# Patient Record
Sex: FEMALE | Race: WHITE | NOT HISPANIC OR LATINO | Employment: OTHER | URBAN - METROPOLITAN AREA
[De-identification: names, ages, dates, MRNs, and addresses within clinical notes are randomized per-mention and may not be internally consistent; named-entity substitution may affect disease eponyms.]

---

## 2017-01-27 ENCOUNTER — RADIANT APPOINTMENT (OUTPATIENT)
Dept: GENERAL RADIOLOGY | Facility: CLINIC | Age: 82
End: 2017-01-27
Attending: PEDIATRICS
Payer: COMMERCIAL

## 2017-01-27 ENCOUNTER — OFFICE VISIT (OUTPATIENT)
Dept: ORTHOPEDICS | Facility: CLINIC | Age: 82
End: 2017-01-27
Payer: COMMERCIAL

## 2017-01-27 VITALS
SYSTOLIC BLOOD PRESSURE: 130 MMHG | DIASTOLIC BLOOD PRESSURE: 72 MMHG | BODY MASS INDEX: 29.77 KG/M2 | WEIGHT: 168 LBS | HEIGHT: 63 IN

## 2017-01-27 DIAGNOSIS — M54.42 LEFT-SIDED LOW BACK PAIN WITH LEFT-SIDED SCIATICA, UNSPECIFIED CHRONICITY: Primary | ICD-10-CM

## 2017-01-27 DIAGNOSIS — M54.5 MIDLINE LOW BACK PAIN, UNSPECIFIED CHRONICITY, WITH SCIATICA PRESENCE UNSPECIFIED: ICD-10-CM

## 2017-01-27 PROCEDURE — 72100 X-RAY EXAM L-S SPINE 2/3 VWS: CPT

## 2017-01-27 PROCEDURE — 99214 OFFICE O/P EST MOD 30 MIN: CPT | Performed by: PEDIATRICS

## 2017-01-27 NOTE — PATIENT INSTRUCTIONS
Advanced imaging is done by appointment. Some insurance require a prior authorization to be completed which may delay the time until you are able to schedule your appointment. You should be receiving a call from the scheduling department, if you have not heard from them in 24-48 hours.   Please call [place], [place] and [place]: [phone] to schedule your Lumbar MRI.  Depending on your availability you can usually schedule within the next 1-2 days.    The clinic will call you with results, if you have not heard from the clinic within 3-4 days following your MRI please contact us at the number listed below.     If you have any further questions for your physician or physician s care team you can call [phone] and use option 3 to leave a voice message. Calls received during business hours will be returned same day.

## 2017-01-27 NOTE — MR AVS SNAPSHOT
After Visit Summary   1/27/2017    Rosemarie Fry    MRN: [id number]           Patient Information     Date Of Birth          11/7/1934        Visit Information        Provider Department      1/27/2017 12:00 PM Jeramy Dumont,  [facility] [place]        Today's Diagnoses     Left-sided low back pain with left-sided sciatica, unspecified chronicity    -  1       Care Instructions     Advanced imaging is done by appointment. Some insurance require a prior authorization to be completed which may delay the time until you are able to schedule your appointment. You should be receiving a call from the scheduling department, if you have not heard from them in 24-48 hours.   Please call [place] and [place]: [phone] to schedule your Lumbar MRI.  Depending on your availability you can usually schedule within the next 1-2 days.    The clinic will call you with results, if you have not heard from the clinic within 3-4 days following your MRI please contact us at the number listed below.     If you have any further questions for your physician or physician s care team you can call [phone] and use option 3 to leave a voice message. Calls received during business hours will be returned same day.                Follow-ups after your visit        Future tests that were ordered for you today     Open Future Orders        Priority Expected Expires Ordered    MR Lumbar Spine w/o Contrast Routine  1/27/2018 1/27/2017            Who to contact     If you have questions or need follow up information about today's clinic visit or your schedule please contact [facility] [place] directly at [phone].  Normal or non-critical lab and imaging results will be communicated to you by MyChart, letter or phone within 4 business days after the clinic has received the results. If you do not hear from us within 7 days, please contact the clinic  "through [employer] or phone. If you have a critical or abnormal lab result, we will notify you by phone as soon as possible.  Submit refill requests through [employer] or call your pharmacy and they will forward the refill request to us. Please allow 3 business days for your refill to be completed.          Additional Information About Your Visit        [employer] Information     [employer] lets you send messages to your doctor, view your test results, renew your prescriptions, schedule appointments and more. To sign up, go to [email or web address] . Click on \"Log in\" on the left side of the screen, which will take you to the Welcome page. Then click on \"Sign up Now\" on the right side of the page.     You will be asked to enter the access code listed below, as well as some personal information. Please follow the directions to create your username and password.     Your access code is: [email or web address]  Expires: 2017  2:13 PM     Your access code will  in 90 days. If you need help or a new code, please call your [facility] clinic or [phone].        Care EveryWhere ID     This is your Care EveryWhere ID. This could be used by other organizations to access your [facility] medical records  [id number]        Your Vitals Were     Height BMI (Body Mass Index)                5' 2.5\" (1.588 m) 30.22 kg/m2           Blood Pressure from Last 3 Encounters:   17 130/72   16 143/80   10/18/16 135/83    Weight from Last 3 Encounters:   17 168 lb (76.204 kg)   16 168 lb 3.2 oz (76.295 kg)   10/18/16 167 lb 3.2 oz (75.841 kg)                 Today's Medication Changes          These changes are accurate as of: 17  2:13 PM.  If you have any questions, ask your nurse or doctor.               These medicines have changed or have updated prescriptions.        Dose/Directions    gabapentin 100 MG capsule   Commonly known as:  NEURONTIN   This may have changed:  additional instructions   Used for:  Analgesic " rebound headache, Chronic tension-type headache, not intractable        Week 1: 100 mg in the evening. Week 2: 100 mg times a day. Week 3 and afterwards : 100 mg three times a day   Quantity:  270 capsule   Refills:  3                Primary Care Provider Office Phone #    [facility] [phone]       No address on file        Thank you!     Thank you for choosing [facility]  for your care. Our goal is always to provide you with excellent care. Hearing back from our patients is one way we can continue to improve our services. Please take a few minutes to complete the written survey that you may receive in the mail after your visit with us. Thank you!             Your Updated Medication List - Protect others around you: Learn how to safely use, store and throw away your medicines at [email or web address].          This list is accurate as of: 1/27/17  2:13 PM.  Always use your most recent med list.                   Brand Name Dispense Instructions for use    budesonide-formoterol 160-4.5 MCG/ACT Inhaler    SYMBICORT    1 Inhaler    Inhale 2 puffs into the lungs 2 times daily       CALCIUM PO      none given       FISH OIL PO      none given       Flax Oil      None Entered       gabapentin 100 MG capsule    NEURONTIN    270 capsule    Week 1: 100 mg in the evening. Week 2: 100 mg times a day. Week 3 and afterwards : 100 mg three times a day       GLUCOSAMINE HCL PO      None Entered       levothyroxine 25 MCG tablet    SYNTHROID/LEVOTHROID    90 tablet    Take 1 tablet (25 mcg) by mouth daily       loratadine 10 MG tablet    CLARITIN    30 tablet    Take 1 tablet (10 mg) by mouth daily       metoprolol 25 MG tablet    LOPRESSOR     Take 25 mg by mouth 2 times daily       nitroglycerin 0.4 MG sublingual tablet    NITROSTAT    25 tablet    Place 1 tablet (0.4 mg) under the tongue every 5 minutes as needed for chest pain If you are still having symptoms after 3 doses (15  minutes) call 911.       VITAMIN D PO      None Entered       WOMENS 50+ MULTI VITAMIN/MIN PO      DAILY       XARELTO 20 MG Tabs tablet   Generic drug:  rivaroxaban ANTICOAGULANT      Take 1 tablet (20 mg) by mouth daily (with dinner)
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
## 2017-01-27 NOTE — Clinical Note
[facility] [place]  [address]  [place] [address]  Phone: [phone]  Fax: [phone]        January 27, 2017      RE:Rosemarie HAIR Fry      To whom it may concern,      Patient is currently under my care for an injury.  Please excuse her from work.  She will have further work up for her injury.  She should be off of work until further evaluation, begin with 1 week., but can be longer pending evaluation.      Sincerely,        Jeramy Dumont DO CAQ/arambula

## 2017-01-31 ENCOUNTER — RADIANT APPOINTMENT (OUTPATIENT)
Dept: MRI IMAGING | Facility: CLINIC | Age: 82
End: 2017-01-31
Attending: PEDIATRICS
Payer: COMMERCIAL

## 2017-01-31 ENCOUNTER — TELEPHONE (OUTPATIENT)
Dept: ORTHOPEDICS | Facility: CLINIC | Age: 82
End: 2017-01-31

## 2017-01-31 DIAGNOSIS — M54.42 LEFT-SIDED LOW BACK PAIN WITH LEFT-SIDED SCIATICA: Primary | ICD-10-CM

## 2017-01-31 DIAGNOSIS — M54.42 LEFT-SIDED LOW BACK PAIN WITH LEFT-SIDED SCIATICA, UNSPECIFIED CHRONICITY: ICD-10-CM

## 2017-01-31 PROCEDURE — 72148 MRI LUMBAR SPINE W/O DYE: CPT | Mod: TC

## 2017-01-31 NOTE — TELEPHONE ENCOUNTER
MRI LUMBAR SPINE WITHOUT CONTRAST January 31, 2017 1:04 PM       HISTORY: Left lumbar radiculopathy. Lumbago with sciatica, left side.     TECHNIQUE: Multiplanar multisequence MRI of the lumbar spine without  contrast.     COMPARISON: X-ray from 1/27/2017.     FINDINGS: The report is dictated assuming five lumbar-type vertebral  bodies.  Chronic superior endplate compression fracture L1 with loss  of approximately 50% of central and anterior vertebral body height.  Schmorl's nodes at the lower thoracic levels imaged. Bone marrow  signal is unremarkable. Tip of the conus medullaris and cauda equina  are unremarkable. Axial scans were performed from T11 to sacrum.     T11-T12: Mild disc bulge without stenosis.     T12-L1: Mild disc bulge and osteophytic ridging. Mild central  stenosis. Neural foramen are patent.     L1-L2: Mild disc bulge. No central stenosis. Neural foramen are  patent. Facet joints are unremarkable.     L2-L3: Mild disc bulge. No central stenosis. Neural foramen are  patent. Facet joints are unremarkable.     L3-L4: Mild disc bulge. No central stenosis. Neural foramen are  patent. Facet joints are unremarkable.     L4-L5: Broad-based disc bulge. Mild left facet degenerative changes.  Mild central stenosis. Mild right and mild to moderate left foraminal  stenosis.     L5-S1: Prominent facet hypertrophy. Mild grade 1 degenerative  spondylolisthesis. Broad-based disc bulge. Moderate central stenosis.  Right neural foramen is patent. Moderate left foraminal stenosis.     Paraspinous soft tissues: Multiple nerve root sleeve cysts seen  throughout.                                                                       IMPRESSION:     1. At T12-L1 there is mild central stenosis.  2. At L4-L5 there is mild central stenosis. Mild right and mild to  moderate left foraminal stenosis.  3. At L5-S1 there is grade 1 degenerative spondylolisthesis with  moderate central stenosis. Moderate left foraminal  stenosis.     EZEQUIEL URBAN MD

## 2017-02-02 NOTE — TELEPHONE ENCOUNTER
MRI reviewed. Most prominent finding is L5-S1 spondylolisthesis, facet arthrosis, and left foraminal stenosis; this may be source of her radicular pain. She also has some stenosis left L4-L5.  Would offer DANIA as previously discussed. F/u 2-3 weeks after injection. Would also suggest PT, may start before or after injection.  Jeramy Dumont, DO, CAQ

## 2017-02-02 NOTE — TELEPHONE ENCOUNTER
LVM with detailed information regarding that Dr Dumont has not reviewed results at this time and is out of clinic today.  Advised that anticipate they will contacted with results when he returns to clinic on 2/3.  Dr Dumont please advise on MRI results below.     Juan Ramon Aggarwal ATC

## 2017-02-07 NOTE — TELEPHONE ENCOUNTER
Patient stopped by in person to discuss MRI results.  Review results and treatment options.  She would like to proceed with both the DANIA and PT.  Orders placed  Yamila Rodriguez MS ATC

## 2017-02-08 ENCOUNTER — TELEPHONE (OUTPATIENT)
Dept: PALLIATIVE MEDICINE | Facility: CLINIC | Age: 82
End: 2017-02-08

## 2017-02-08 NOTE — TELEPHONE ENCOUNTER
Pre-screening questions for Radiology Injections:    Injection to be done at which interventional clinic site? [facility] [place]    Procedure ordered by Dr. Dumont    Procedure ordered? Lumbar Epidural Steroid Injection    What insurance would patient like us to bill for this procedure? [employer]      [employer]'s comp-Any injection DO NOT SCHEDULE and route to Argelia Teresa.      [employer] insurance - If scheduling an SI joint injection DO NOT SCHEDULE and route Argelia Teresa.    [employer]- MBB's must be scheduled at LEAST two weeks apart      [employer] - Any injection besides hip/shoulder/knee joint DO NOT SCHEDULE and route to Argelia Teresa. She will obtain PA and call pt back to schedule procedure or notify pt of denial.     Is an  needed? No     Patient has a drive home? (mandatory) YES:      Is patient taking any blood thinners (plavix, coumadin, jantoven, warfarin, heparin, pradaxa or dabigatran )? No   (If so, do not schedule, contact RN and/or MD)     Is patient taking any aspirin products? No   (If more than 325mg/day do not schedule; Contact RN/MD. For all non-cervical interventional procedures if patient is taking MORE than 325mg/day, limit aspirin to 81-325mg/day x 1 week. No hold required day of procedure.  For CERVICAL procedures, hold all aspirin products for 6 days.)      Does the patient have a bleeding or clotting disorder? No   (If yes, okay to schedule, but contact RN/MD).  **For any patients with platelet count <100, must be forwarded to provider**    Is patient diabetic?  No  If YES, have them bring their glucometer.    Does patient have an active infection or treated for one within the past week? No     Is patient currently taking any antibiotics?  No   For patients on chronic, preventative, or prophylactic antibiotics, procedures can be scheduled.   For patients on antibiotics for active or recent infection:  Toshia Moore, Jose Enrique Martinez,  [place]-antibiotic course must have been completed for 4 days  Toshia Berkowitz-antibiotic course must have been completed for 7 days    Is patient currently taking any steroid medications? (i.e. Prednisone, Medrol)  No   For patients on steroid medications:  Juan Howard [place], [place]-steroid course must have been completed for 4 days  Toshia Berkowitz-steroid course must have been completed for 7 days  Review with patient:  If you are started on any steroids or antibiotics between now and your appointment, you must contact us because it may affect our ability to perform your procedure informed    Is patient actively being treated for cancer or immunocompromised, including the spleen having been removed? No  **For Dr. Short patients without spleens should have the chart sent to her**  (If YES, do NOT schedule and route to RN)    Are you able to get on and off an exam table with minimal or no assistance? Yes  (If NO, do NOT schedule and route to RN)  Are you able to roll over and lay on your stomach with minimal or no assistance? Yes  (If NO, do NOT schedule and route to RN)         Any allergies to contrast dye, iodine, shellfish, or numbing and steroid medications? No  (If so, inform nursing and note in scheduling comments.)    Allergies: Sulfa drugs      Any chance of pregnancy?NO    Has the patient had a flu shot or any other vaccinations within 7 days before or after the procedure.  No       Does patient have an MRI/CT?  YES: MRI  (SI joint, hip injections, lumbar sympathetic blocks, and stellate ganglion blocks do not require an MRI)    If so, was it done at [facility]? Yes      If not, where was it done? N/A     Was the MRI done w/in the last 3 years?  Yes   If MRI was not done at [facility], [facility] or [facility] do NOT schedule. Route to nursing.  (If pt has disc the injection can be scheduled but pt has to bring disc to appt. If they show up w/out disc the injection cannot be  done)    Reminders (please tell patient if applicable):       Instructed pt to arrive 30 minutes early for IV start if this is for a cervical procedure, ALL sympathetic (stellate ganglion, hypogastric, or lumbar sympathetic block) and all sedation procedures (RFA, spinal cord stimulation trials).  Not Applicable    -IVs are not routinely placed for Martinez and [place] cervical case       If NPO for sedation, informed patient that it is okay to take medications with sips of water (except if they are to hold blood thinners).  Not Applicable   *DO take blood pressure medication if it is prescribed*      If this is for a cervical DANIA, informed patient that aspirin needs to be held for 6 days.   Not Applicable      Do not schedule procedures requiring IV placement in the first appointment of the day or first appointment after lunch         For patients 85 or older we recommend having an adult stay w/ them for the remainder of the day.         Does the patient have any questions?  NO      Kike Mckeon  [facility]

## 2017-02-22 ENCOUNTER — RADIOLOGY INJECTION OFFICE VISIT (OUTPATIENT)
Dept: PALLIATIVE MEDICINE | Facility: CLINIC | Age: 82
End: 2017-02-22
Payer: COMMERCIAL

## 2017-02-22 ENCOUNTER — TELEPHONE (OUTPATIENT)
Dept: ORTHOPEDICS | Facility: CLINIC | Age: 82
End: 2017-02-22

## 2017-02-22 VITALS — SYSTOLIC BLOOD PRESSURE: 148 MMHG | HEART RATE: 82 BPM | DIASTOLIC BLOOD PRESSURE: 91 MMHG

## 2017-02-22 DIAGNOSIS — M54.16 LUMBAR RADICULOPATHY: ICD-10-CM

## 2017-02-22 DIAGNOSIS — M54.42 LEFT-SIDED LOW BACK PAIN WITH LEFT-SIDED SCIATICA, UNSPECIFIED CHRONICITY: Primary | ICD-10-CM

## 2017-02-22 DIAGNOSIS — M54.16 LUMBAR RADICULOPATHY: Primary | ICD-10-CM

## 2017-02-22 PROCEDURE — 99207 ZZC NO BILLABLE SERVICE THIS VISIT: CPT | Performed by: PSYCHIATRY & NEUROLOGY

## 2017-02-22 RX ORDER — METHYLPREDNISOLONE 4 MG
TABLET, DOSE PACK ORAL
Qty: 21 TABLET | Refills: 0 | Status: SHIPPED | OUTPATIENT
Start: 2017-02-22 | End: 2018-02-26

## 2017-02-22 ASSESSMENT — PAIN SCALES - GENERAL: PAINLEVEL: MODERATE PAIN (5)

## 2017-02-22 NOTE — TELEPHONE ENCOUNTER
Dr Quispe had spoken with me prior to note below. Injection not done today.  We can try an oral steroid, though there is a risk of atrial fibrillation and use of high dose oral steroid (she has known a-fib and is on blood thinner as noted below).  Medrol dose pack rx placed.  Contact clinic with update in 2 weeks, sooner if needed; follow up in clinic 4-6 weeks after starting PT. Thanks.  Jeramy Dumont, , CAQ

## 2017-02-22 NOTE — PATIENT INSTRUCTIONS
Nurse Triage line:  [phone]   Call this number with any questions or concerns. You may leave a detailed message anytime. Calls are typically returned Monday through Friday between 8 AM and 4:30 PM. We usually get back to you within 2 business days depending on the issue/request.       Medication refills:    For non-narcotic medications, call your pharmacy directly to request a refill. The pharmacy will contact the [facility] for authorization. Please allow 3-4 days for these refills to be processed.     For narcotic refills, call the nurse triage line or send a Youxiduo message. Please contact us 7-10 days before your refill is due. The message MUST include the name of the specific medication(s) requested and how you would like to receive the prescription(s). The options are as follows:    Pain Clinic staff can mail the prescription to your pharmacy. Please tell us the name of the pharmacy.    You may pick the prescription up at the Pain Clinic (tell us the location) or during a clinic visit with your pain provider    Pain Clinic staff can deliver the prescription to the [facility] pharmacy in the clinic building. Please tell us the location.      Scheduling number: [phone].  Call this number to schedule or change appointments.    We believe regular attendance is key to your success in our program.    Any time you are unable to keep your appointment we ask that you call us at least 24 hours in advance to let us know. This will allow us to offer the appointment time to another patient.

## 2017-02-22 NOTE — TELEPHONE ENCOUNTER
Patient was scheduled with Dr. Quispe for an DANIA.  Patient states she did not want the injection and is also on Xarleto.  She states she was unaware she had to be off of her blood thinner, but also does not feel she needs the injection.  Spoke with her and her daughter, and her daughter would like Rosemarie to try an oral steroid instead.  Asked if she is still on the gabapentin, and she states that she was only on that medication for her neck from the neurologist and she currently has no pain in her neck so she will not keep taking it as she does not want to keep taking pills for her pains.  I discussed the use of the gabapentin for her back pain as well, she does not want to do this.   She has not begun PT as she feels it is not necessary with her working 8 hours a day.  We discussed what PT will do for her.  Her daughter agreed to have her schedule PT.  They will call if they desire DANIA.  Please advise on oral steroid.  Pharmacy selected

## 2017-02-22 NOTE — PROGRESS NOTES
Patient came and was on Xeralto.  This was not disclosed when she scheduled injection.  She and daughter were also not convinced on going forward with injection. Would like to consider PT and steroids.    Discussed what an epidural is and expectations.   Talked with Dr. Dumont, who will come up with plan for patient.  They were walked over to his clinic to discuss further.    Beverley Quispe MD  [facility]

## 2017-02-22 NOTE — TELEPHONE ENCOUNTER
LVM on Yesi's phone (daughter) with detailed information regarding the rx had been provided and sent to pharmacy as requested.     Juan Ramon Aggarwal, ATC

## 2017-02-22 NOTE — NURSING NOTE
"Chief Complaint   Patient presents with     Pain       Initial /63 (BP Location: Left arm)  Pulse 82 Estimated body mass index is 30.24 kg/(m^2) as calculated from the following:    Height as of 1/27/17: 1.588 m (5' 2.5\").    Weight as of 1/27/17: 76.2 kg (168 lb).  Medication Reconciliation: complete     Injection intake:    If this procedure is requiring IV sedation has patient been NPO for 6  Hours? NA    Is patient on coumadin, plavix or other prescribed blood thinner?   Yes, Xarelto    If patient is on coumadin was it held for 5 days?  NO     If patient is on plavix was it held for 7 days?    NA     Does patient take aspirin?  No    If this is for a cervical procedure and patient is on aspirin has it been held for 6 days?   NA    Any allergies to contrast dye, iodine, steroid and/or numbing medications?  NO    Is patient currently taking antibiotics or have an active infection?  NO    Does patient have a ? Yes       Is patient pregnant or breastfeeding?  NO    Are the vital signs normal?  Yes      Ayo Ramirez MA      "

## 2017-02-22 NOTE — MR AVS SNAPSHOT
After Visit Summary   2/22/2017    Rosemarie Fry    MRN: [id number]           Patient Information     Date Of Birth          11/7/1934        Visit Information        Provider Department      2/22/2017 8:15 AM Mireya Quispe MD [facility] [place]        Care Instructions        Nurse Triage line:  [phone]   Call this number with any questions or concerns. You may leave a detailed message anytime. Calls are typically returned Monday through Friday between 8 AM and 4:30 PM. We usually get back to you within 2 business days depending on the issue/request.       Medication refills:    For non-narcotic medications, call your pharmacy directly to request a refill. The pharmacy will contact the [facility] for authorization. Please allow 3-4 days for these refills to be processed.     For narcotic refills, call the nurse triage line or send a Visier message. Please contact us 7-10 days before your refill is due. The message MUST include the name of the specific medication(s) requested and how you would like to receive the prescription(s). The options are as follows:    Pain Clinic staff can mail the prescription to your pharmacy. Please tell us the name of the pharmacy.    You may pick the prescription up at the Pain Clinic (tell us the location) or during a clinic visit with your pain provider    Pain Clinic staff can deliver the prescription to the [facility] pharmacy in the clinic building. Please tell us the location.      Scheduling number: [phone].  Call this number to schedule or change appointments.    We believe regular attendance is key to your success in our program.    Any time you are unable to keep your appointment we ask that you call us at least 24 hours in advance to let us know. This will allow us to offer the appointment time to another patient.               Follow-ups after your visit        Who to contact     If you have questions or need follow up  "information about today's clinic visit or your schedule please contact [facility] directly at [phone].  Normal or non-critical lab and imaging results will be communicated to you by MyChart, letter or phone within 4 business days after the clinic has received the results. If you do not hear from us within 7 days, please contact the clinic through [employer] or phone. If you have a critical or abnormal lab result, we will notify you by phone as soon as possible.  Submit refill requests through Wantworthy or call your pharmacy and they will forward the refill request to us. Please allow 3 business days for your refill to be completed.          Additional Information About Your Visit        MyChart Information     Wantworthy lets you send messages to your doctor, view your test results, renew your prescriptions, schedule appointments and more. To sign up, go to [email or web address] . Click on \"Log in\" on the left side of the screen, which will take you to the Welcome page. Then click on \"Sign up Now\" on the right side of the page.     You will be asked to enter the access code listed below, as well as some personal information. Please follow the directions to create your username and password.     Your access code is: [email or web address]  Expires: 2017  2:13 PM     Your access code will  in 90 days. If you need help or a new code, please call your [facility] clinic or [phone].        Care EveryWhere ID     This is your Care EveryWhere ID. This could be used by other organizations to access your [facility] medical records  [id number]        Your Vitals Were     Pulse                   82            Blood Pressure from Last 3 Encounters:   17 (!) 148/91   17 130/72   16 143/80    Weight from Last 3 Encounters:   17 76.2 kg (168 lb)   16 76.3 kg (168 lb 3.2 oz)   10/18/16 75.8 kg (167 lb 3.2 oz)              Today, you had the following     No orders found for display       "   Today's Medication Changes          These changes are accurate as of: 2/22/17  8:44 AM.  If you have any questions, ask your nurse or doctor.               These medicines have changed or have updated prescriptions.        Dose/Directions    gabapentin 100 MG capsule   Commonly known as:  NEURONTIN   This may have changed:  additional instructions   Used for:  Analgesic rebound headache, Chronic tension-type headache, not intractable        Week 1: 100 mg in the evening. Week 2: 100 mg times a day. Week 3 and afterwards : 100 mg three times a day   Quantity:  270 capsule   Refills:  3                Primary Care Provider Office Phone #    [facility] [phone]       No address on file        Thank you!     Thank you for choosing [facility]  for your care. Our goal is always to provide you with excellent care. Hearing back from our patients is one way we can continue to improve our services. Please take a few minutes to complete the written survey that you may receive in the mail after your visit with us. Thank you!             Your Updated Medication List - Protect others around you: Learn how to safely use, store and throw away your medicines at www.disposemymeds.org.          This list is accurate as of: 2/22/17  8:44 AM.  Always use your most recent med list.                   Brand Name Dispense Instructions for use    budesonide-formoterol 160-4.5 MCG/ACT Inhaler    SYMBICORT    1 Inhaler    Inhale 2 puffs into the lungs 2 times daily       CALCIUM PO      none given       FISH OIL PO      none given       Flax Oil      None Entered       gabapentin 100 MG capsule    NEURONTIN    270 capsule    Week 1: 100 mg in the evening. Week 2: 100 mg times a day. Week 3 and afterwards : 100 mg three times a day       GLUCOSAMINE HCL PO      None Entered       levothyroxine 25 MCG tablet    SYNTHROID/LEVOTHROID    90 tablet    Take 1 tablet (25 mcg) by mouth daily       loratadine 10 MG tablet     CLARITIN    30 tablet    Take 1 tablet (10 mg) by mouth daily       metoprolol 25 MG tablet    LOPRESSOR     Take 25 mg by mouth 2 times daily       nitroglycerin 0.4 MG sublingual tablet    NITROSTAT    25 tablet    Place 1 tablet (0.4 mg) under the tongue every 5 minutes as needed for chest pain If you are still having symptoms after 3 doses (15 minutes) call 911.       VITAMIN D PO      None Entered       WOMENS 50+ MULTI VITAMIN/MIN PO      DAILY       XARELTO 20 MG Tabs tablet   Generic drug:  rivaroxaban ANTICOAGULANT      Take 1 tablet (20 mg) by mouth daily (with dinner)

## 2017-02-23 ENCOUNTER — THERAPY VISIT (OUTPATIENT)
Dept: PHYSICAL THERAPY | Facility: CLINIC | Age: 82
End: 2017-02-23
Payer: COMMERCIAL

## 2017-02-23 DIAGNOSIS — M54.16 LUMBAR RADICULOPATHY: Primary | ICD-10-CM

## 2017-02-23 PROCEDURE — 97110 THERAPEUTIC EXERCISES: CPT | Mod: GP | Performed by: PHYSICAL THERAPIST

## 2017-02-23 PROCEDURE — 97161 PT EVAL LOW COMPLEX 20 MIN: CPT | Mod: GP | Performed by: PHYSICAL THERAPIST

## 2017-02-23 NOTE — MR AVS SNAPSHOT
"              After Visit Summary   2/23/2017    Rosemarie Fry    MRN: [id number]           Patient Information     Date Of Birth          11/7/1934        Visit Information        Provider Department      2/23/2017 1:15 PM Chavo Bran PT [facility] [place] [facility]        Today's Diagnoses     Lumbar radiculopathy    -  1       Follow-ups after your visit        Your next 10 appointments already scheduled     Mar 03, 2017  8:45 AM CST   [facility] with Chavo Bran PT   [facility] [place] [facility] ([facility] [place])    [address]  [address]  [place] [id number] [phone]              Who to contact     If you have questions or need follow up information about today's clinic visit or your schedule please contact [facility] [place] [facility] directly at [phone].  Normal or non-critical lab and imaging results will be communicated to you by MyChart, letter or phone within 4 business days after the clinic has received the results. If you do not hear from us within 7 days, please contact the clinic through [employer] or phone. If you have a critical or abnormal lab result, we will notify you by phone as soon as possible.  Submit refill requests through [employer] or call your pharmacy and they will forward the refill request to us. Please allow 3 business days for your refill to be completed.          Additional Information About Your Visit        MyChart Information     [employer] lets you send messages to your doctor, view your test results, renew your prescriptions, schedule appointments and more. To sign up, go to [email or web address] . Click on \"Log in\" on the left side of the screen, which will take you to the Welcome page. Then click on \"Sign up Now\" on the right side of the page.     You will be asked to enter the access code listed below, as well as some personal information. Please follow the directions to create your username and password.   "   Your access code is: [email or web address]  Expires: 2017  2:13 PM     Your access code will  in 90 days. If you need help or a new code, please call your [facility] or [phone].        Care EveryWhere ID     This is your Care EveryWhere ID. This could be used by other organizations to access your [facility] medical records  [id number]         Blood Pressure from Last 3 Encounters:   17 (!) 148/91   17 130/72   16 143/80    Weight from Last 3 Encounters:   17 76.2 kg (168 lb)   16 76.3 kg (168 lb 3.2 oz)   10/18/16 75.8 kg (167 lb 3.2 oz)              We Performed the Following     PT Eval, Low Complexity (60758)     Therapeutic Exercises          Today's Medication Changes          These changes are accurate as of: 17  9:00 PM.  If you have any questions, ask your nurse or doctor.               These medicines have changed or have updated prescriptions.        Dose/Directions    gabapentin 100 MG capsule   Commonly known as:  NEURONTIN   This may have changed:  additional instructions   Used for:  Analgesic rebound headache, Chronic tension-type headache, not intractable        Week 1: 100 mg in the evening. Week 2: 100 mg times a day. Week 3 and afterwards : 100 mg three times a day   Quantity:  270 capsule   Refills:  3                Primary Care Provider Office Phone #    [facility] [phone]       No address on file        Thank you!     Thank you for choosing [facility]  for your care. Our goal is always to provide you with excellent care. Hearing back from our patients is one way we can continue to improve our services. Please take a few minutes to complete the written survey that you may receive in the mail after your visit with us. Thank you!             Your Updated Medication List - Protect others around you: Learn how to safely use, store and throw away your medicines at www.disposemymeds.org.          This list  is accurate as of: 2/23/17  9:00 PM.  Always use your most recent med list.                   Brand Name Dispense Instructions for use    budesonide-formoterol 160-4.5 MCG/ACT Inhaler    SYMBICORT    1 Inhaler    Inhale 2 puffs into the lungs 2 times daily       CALCIUM PO      none given       FISH OIL PO      none given       Flax Oil      None Entered       gabapentin 100 MG capsule    NEURONTIN    270 capsule    Week 1: 100 mg in the evening. Week 2: 100 mg times a day. Week 3 and afterwards : 100 mg three times a day       GLUCOSAMINE HCL PO      None Entered       levothyroxine 25 MCG tablet    SYNTHROID/LEVOTHROID    90 tablet    Take 1 tablet (25 mcg) by mouth daily       loratadine 10 MG tablet    CLARITIN    30 tablet    Take 1 tablet (10 mg) by mouth daily       methylPREDNISolone 4 MG tablet    MEDROL DOSEPAK    21 tablet    Follow package instructions       metoprolol 25 MG tablet    LOPRESSOR     Take 25 mg by mouth 2 times daily       nitroglycerin 0.4 MG sublingual tablet    NITROSTAT    25 tablet    Place 1 tablet (0.4 mg) under the tongue every 5 minutes as needed for chest pain If you are still having symptoms after 3 doses (15 minutes) call 911.       VITAMIN D PO      None Entered       WOMENS 50+ MULTI VITAMIN/MIN PO      DAILY       XARELTO 20 MG Tabs tablet   Generic drug:  rivaroxaban ANTICOAGULANT      Take 1 tablet (20 mg) by mouth daily (with dinner)

## 2017-02-23 NOTE — PROGRESS NOTES
"[facility] Initial Evaluation      Subjective:    Rosemarie Fry is a 82 year old female with a lumbar condition.      This is a new condition  Pt states pain started in mid January after lifting some boxes.  Pt was scheduled for lumbar epidural injection yesterday but didn't proceed with it d/t still being on a blood thinner that she states she was unaware she could not take.  Referred to PT 01/31/2017.  Pt states also has knee injection scheduled for next week.    Patient reports pain:  Lumbar spine left.  Radiates to:  Thigh left and lower leg left.  Quality: \"excruciating\" and is constant and reported as 5/10.   Pain is worse in the P.M..  Exacerbated by: sit to stand, stairs, sitting. Relieved by: walking.  Since onset symptoms are unchanged.  Special testing: lumbar xray and MRI 01/2017 in chart.      General health as reported by patient is good.  Pertinent medical history includes:  Heart problems (although Dr Dumont' note 01/27/2017 reflects a history of vertebral fracture 26 years ago).  Medical allergies: yes (see chart).  Other surgeries include:  No.  Current medications:  Anti-inflammatory and heparin/coumadin.  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  Patient is working in normal job without restrictions.  Primary job tasks include:  Lifting and repetitive tasks.    Barriers include:  Lives alone.    Red flags:  None as reported by the patient.  Pt states her goal is that it's \"all gone.\"                    Objective:      Gait:  Antalgic gait pattern upon arrival, abolished after session.                   Lumbar/SI Evaluation  ROM:      Strength: TA MMT 1/5  Lumbar Myotomes:    T12-L3 (Hip Flex):  Left: 4+    Right: 4+  L2-4 (Quads):  Left:  4+    Right:  4+  L4 (Ankle DF):  Left:  4+    Right:  4+  L5 (Great Toe Ext): Left: 4+    Right: 4+   S1 (Toe Raise):  Left: 4+    Right: 4+      Lumbar Dermtomes:  normal                Neural Tension/Mobility:      Left side:SLR  " negative.     Right side:   SLR  negative.   Lumbar Palpation:  normal        Lumbar Provocation:      Left negative with:  PROM hip    Right negative with:  PROM hip    SI joint/Sacrum:    Negative Chidi, thigh thrustTONYA Lumbar Evaluation    Posture:  Sitting: fair  Standing: fair  Lordosis: Reduced  Lateral Shift: no  Correction of Posture: no effect    Movement Loss:  Flexion (Flex): min and pain  Extension (EXT): mod  Side Glide R (SG R): pain and min  Side [place] L (SG L): pain and min  Test Movements:  FIS: During: peripheralizing  After: peripheralizing  Pretest Movements: L low back to L lower leg  Repeat FIS: During: peripheralizing  After: peripheralizing    EIS: During: centralizing  After: centralizing    Repeat EIS: During: centralizing  After: centralizing    IRLANDA: During: no effect  After: no effect    Repeat IRLANDA: During: no effect  After: no effect    EIL: During: centralizing  After: centralizing    Repeat EIL: During: centralizing  After: centralizing  Mechanical Response: IncROM        Conclusion: derangement  Principle of Treatment:      Extension: yes                                           ROS    Assessment/Plan:      Patient is a 82 year old female with lumbar complaints.    Patient has the following significant findings with corresponding treatment plan.                Diagnosis 1:  Lumbar radiculopathy with corroborating L knee pain  Pain -  hot/cold therapy and directional preference exercise  Decreased ROM/flexibility - manual therapy and therapeutic exercise  Decreased strength - therapeutic exercise and therapeutic activities  Impaired gait - gait training  Decreased function - therapeutic activities  Impaired posture - neuro re-education    Therapy Evaluation Codes:   1) History comprised of:   Personal factors that impact the plan of care:      Work status.    Comorbidity factors that impact the plan of care are:       Osteoarthritis.     Medications impacting care: Anti-inflammatory and Steroids.  2) Examination of Body Systems comprised of:   Body structures and functions that impact the plan of care:      Knee and Lumbar spine.   Activity limitations that impact the plan of care are:      Sitting and Stairs.  3) Clinical presentation characteristics are:   Stable/Uncomplicated.  4) Decision-Making    Low complexity using standardized patient assessment instrument and/or measureable assessment of functional outcome.  Cumulative Therapy Evaluation is: Low complexity.    Previous and current functional limitations:  (See Goal Flow Sheet for this information)    Short term and Long term goals: (See Goal Flow Sheet for this information)     Communication ability:  Patient appears to be able to clearly communicate and understand verbal and written communication and follow directions correctly.  Treatment Explanation - The following has been discussed with the patient:   RX ordered/plan of care  Anticipated outcomes  Possible risks and side effects  This patient would benefit from PT intervention to resume normal activities.   Rehab potential is good.    Frequency:  1 X week, once daily  Duration:  for 4 weeks  Discharge Plan:  Achieve all LTG.  Independent in home treatment program.  Reach maximal therapeutic benefit.    Please refer to the daily flowsheet for treatment today, total treatment time and time spent performing 1:1 timed codes.

## 2017-03-03 ENCOUNTER — THERAPY VISIT (OUTPATIENT)
Dept: PHYSICAL THERAPY | Facility: CLINIC | Age: 82
End: 2017-03-03
Payer: COMMERCIAL

## 2017-03-03 DIAGNOSIS — M54.16 LUMBAR RADICULOPATHY: ICD-10-CM

## 2017-03-03 PROCEDURE — 97110 THERAPEUTIC EXERCISES: CPT | Mod: GP | Performed by: PHYSICAL THERAPIST

## 2017-03-03 PROCEDURE — 97112 NEUROMUSCULAR REEDUCATION: CPT | Mod: GP | Performed by: PHYSICAL THERAPIST

## 2017-03-03 PROCEDURE — 97530 THERAPEUTIC ACTIVITIES: CPT | Mod: GP | Performed by: PHYSICAL THERAPIST

## 2017-03-03 NOTE — PROGRESS NOTES
Subjective:    HPI                    Objective:    System    Physical Exam    General     ROS    Assessment/Plan:      SUBJECTIVE  Subjective: Pt states has not done HEP at all since last appt.  Had knee injection last week and that is feeling better but back still gets sore with prolonged lifting.   Current Pain level: 3/10   Changes in function:  Yes (See Goal flowsheet attached for changes in current functional level)     Adverse reaction to treatment or activity:  None    OBJECTIVE  Objective: Standing trunk flexion and extension initially without symptoms in clinic.  Floor to waist lifting with 10 pounds pt demonstrated trunk flexion without knee flexion and noted LBP.     ASSESSMENT  Rosemarie continues to require intervention to meet STG and LTG's: PT  Patient's symptoms are resolving.  Response to therapy has shown an improvement in knee pain but back still problematic  Progress made towards STG/LTG?  Yes (See Goal flowsheet attached for updates on achievement of STG and LTG)    PLAN  Would like to see greater compliance to HEP, especially given symptoms with flexion that are reduced with extension.    PTA/ATC plan:  N/A    Please refer to the daily flowsheet for treatment today, total treatment time and time spent performing 1:1 timed codes.

## 2017-03-03 NOTE — MR AVS SNAPSHOT
"              After Visit Summary   3/3/2017    Rosemarie Fry    MRN: [id number]           Patient Information     Date Of Birth          11/7/1934        Visit Information        Provider Department      3/3/2017 12:35 PM Chavo Bran PT [facility] [place] [facility]        Today's Diagnoses     Lumbar radiculopathy           Follow-ups after your visit        Your next 10 appointments already scheduled     Mar 10, 2017  1:55 PM CST   [facility] with Chavo Bran PT   [facility] [place] [facility] ([facility] [place])    [address] [address]  [address]  [place] [id number] [phone]              Who to contact     If you have questions or need follow up information about today's clinic visit or your schedule please contact [facility] [place] directly at [phone].  Normal or non-critical lab and imaging results will be communicated to you by EyeSee360hart, letter or phone within 4 business days after the clinic has received the results. If you do not hear from us within 7 days, please contact the clinic through EyeSee360hart or phone. If you have a critical or abnormal lab result, we will notify you by phone as soon as possible.  Submit refill requests through [employer] or call your pharmacy and they will forward the refill request to us. Please allow 3 business days for your refill to be completed.          Additional Information About Your Visit        MyChart Information     Sentri lets you send messages to your doctor, view your test results, renew your prescriptions, schedule appointments and more. To sign up, go to [email or web address] . Click on \"Log in\" on the left side of the screen, which will take you to the Welcome page. Then click on \"Sign up Now\" on the right side of the page.     You will be asked to enter the access code listed below, as well as some personal information. Please follow the directions to create your username and password.   "   Your access code is: [email or web address]  Expires: 2017  2:13 PM     Your access code will  in 90 days. If you need help or a new code, please call your [facility] or [phone].        Care EveryWhere ID     This is your Care EveryWhere ID. This could be used by other organizations to access your [place] medical records  [id number]         Blood Pressure from Last 3 Encounters:   17 (!) 148/91   17 130/72   16 143/80    Weight from Last 3 Encounters:   17 76.2 kg (168 lb)   16 76.3 kg (168 lb 3.2 oz)   10/18/16 75.8 kg (167 lb 3.2 oz)              We Performed the Following     Neuromuscular Re-Education     Therapeutic Activities     Therapeutic Exercises          Today's Medication Changes          These changes are accurate as of: 3/3/17  3:28 PM.  If you have any questions, ask your nurse or doctor.               These medicines have changed or have updated prescriptions.        Dose/Directions    gabapentin 100 MG capsule   Commonly known as:  NEURONTIN   This may have changed:  additional instructions   Used for:  Analgesic rebound headache, Chronic tension-type headache, not intractable        Week 1: 100 mg in the evening. Week 2: 100 mg times a day. Week 3 and afterwards : 100 mg three times a day   Quantity:  270 capsule   Refills:  3                Primary Care Provider Office Phone #    [place] [facility] [phone]       No address on file        Thank you!     Thank you for choosing [facility]  for your care. Our goal is always to provide you with excellent care. Hearing back from our patients is one way we can continue to improve our services. Please take a few minutes to complete the written survey that you may receive in the mail after your visit with us. Thank you!             Your Updated Medication List - Protect others around you: Learn how to safely use, store and throw away your medicines at  www.disposemymeds.org.          This list is accurate as of: 3/3/17  3:28 PM.  Always use your most recent med list.                   Brand Name Dispense Instructions for use    budesonide-formoterol 160-4.5 MCG/ACT Inhaler    SYMBICORT    1 Inhaler    Inhale 2 puffs into the lungs 2 times daily       CALCIUM PO      none given       FISH OIL PO      none given       Flax Oil      None Entered       gabapentin 100 MG capsule    NEURONTIN    270 capsule    Week 1: 100 mg in the evening. Week 2: 100 mg times a day. Week 3 and afterwards : 100 mg three times a day       GLUCOSAMINE HCL PO      None Entered       levothyroxine 25 MCG tablet    SYNTHROID/LEVOTHROID    90 tablet    Take 1 tablet (25 mcg) by mouth daily       loratadine 10 MG tablet    CLARITIN    30 tablet    Take 1 tablet (10 mg) by mouth daily       methylPREDNISolone 4 MG tablet    MEDROL DOSEPAK    21 tablet    Follow package instructions       metoprolol 25 MG tablet    LOPRESSOR     Take 25 mg by mouth 2 times daily       nitroglycerin 0.4 MG sublingual tablet    NITROSTAT    25 tablet    Place 1 tablet (0.4 mg) under the tongue every 5 minutes as needed for chest pain If you are still having symptoms after 3 doses (15 minutes) call 911.       VITAMIN D PO      None Entered       WOMENS 50+ MULTI VITAMIN/MIN PO      DAILY       XARELTO 20 MG Tabs tablet   Generic drug:  rivaroxaban ANTICOAGULANT      Take 1 tablet (20 mg) by mouth daily (with dinner)

## 2017-03-09 NOTE — TELEPHONE ENCOUNTER
Received message from patient's daughter that Rosemarie desires to pursue DANIA at this time, but that Pain Management is requesting updated order.  Discussed with Dr Dumont, ok to order injection if that is desired.  Repeat injection order provided.  Pain will contact patient to schedule.    Juan Ramon Aggarwal, ATC

## 2017-03-21 ENCOUNTER — THERAPY VISIT (OUTPATIENT)
Dept: PHYSICAL THERAPY | Facility: CLINIC | Age: 82
End: 2017-03-21
Payer: COMMERCIAL

## 2017-03-21 DIAGNOSIS — M54.16 LUMBAR RADICULOPATHY: ICD-10-CM

## 2017-03-21 PROCEDURE — 97530 THERAPEUTIC ACTIVITIES: CPT | Mod: GP | Performed by: PHYSICAL THERAPIST

## 2017-03-21 PROCEDURE — 97110 THERAPEUTIC EXERCISES: CPT | Mod: GP | Performed by: PHYSICAL THERAPIST

## 2017-03-21 NOTE — MR AVS SNAPSHOT
"              After Visit Summary   3/21/2017    Rosemarie Fry    MRN: [id number]           Patient Information     Date Of Birth          1934        Visit Information        Provider Department      3/21/2017 12:00 PM [facility] Bran PT [facility] [place]        Today's Diagnoses     Lumbar radiculopathy           Follow-ups after your visit        Who to contact     If you have questions or need follow up information about today's clinic visit or your schedule please contact [facility] [place] directly at [phone].  Normal or non-critical lab and imaging results will be communicated to you by [email or web address], letter or phone within 4 business days after the clinic has received the results. If you do not hear from us within 7 days, please contact the clinic through [employer] or phone. If you have a critical or abnormal lab result, we will notify you by phone as soon as possible.  Submit refill requests through [employer] or call your pharmacy and they will forward the refill request to us. Please allow 3 business days for your refill to be completed.          Additional Information About Your Visit        MyChart Information     [employer] lets you send messages to your doctor, view your test results, renew your prescriptions, schedule appointments and more. To sign up, go to [email or web address] . Click on \"Log in\" on the left side of the screen, which will take you to the Welcome page. Then click on \"Sign up Now\" on the right side of the page.     You will be asked to enter the access code listed below, as well as some personal information. Please follow the directions to create your username and password.     Your access code is: [email or web address]  Expires: 2017  3:13 PM     Your access code will  in 90 days. If you need help or a new code, please call your [place] clinic or [phone].        Care EveryWhere ID     This is your Care EveryWhere ID. This could " be used by other organizations to access your [place] medical records  [id number]         Blood Pressure from Last 3 Encounters:   02/22/17 (!) 148/91   01/27/17 130/72   11/01/16 143/80    Weight from Last 3 Encounters:   01/27/17 76.2 kg (168 lb)   11/01/16 76.3 kg (168 lb 3.2 oz)   10/18/16 75.8 kg (167 lb 3.2 oz)              We Performed the Following     Therapeutic Activities     Therapeutic Exercises          Today's Medication Changes          These changes are accurate as of: 3/21/17  1:02 PM.  If you have any questions, ask your nurse or doctor.               These medicines have changed or have updated prescriptions.        Dose/Directions    gabapentin 100 MG capsule   Commonly known as:  NEURONTIN   This may have changed:  additional instructions   Used for:  Analgesic rebound headache, Chronic tension-type headache, not intractable        Week 1: 100 mg in the evening. Week 2: 100 mg times a day. Week 3 and afterwards : 100 mg three times a day   Quantity:  270 capsule   Refills:  3                Primary Care Provider Office Phone #    [facility] [phone]       No address on file        Thank you!     Thank you for choosing [facility]  for your care. Our goal is always to provide you with excellent care. Hearing back from our patients is one way we can continue to improve our services. Please take a few minutes to complete the written survey that you may receive in the mail after your visit with us. Thank you!             Your Updated Medication List - Protect others around you: Learn how to safely use, store and throw away your medicines at www.disposemymeds.org.          This list is accurate as of: 3/21/17  1:02 PM.  Always use your most recent med list.                   Brand Name Dispense Instructions for use    budesonide-formoterol 160-4.5 MCG/ACT Inhaler    SYMBICORT    1 Inhaler    Inhale 2 puffs into the lungs 2 times daily       CALCIUM PO       none given       FISH OIL PO      none given       Flax Oil      None Entered       gabapentin 100 MG capsule    NEURONTIN    270 capsule    Week 1: 100 mg in the evening. Week 2: 100 mg times a day. Week 3 and afterwards : 100 mg three times a day       GLUCOSAMINE HCL PO      None Entered       levothyroxine 25 MCG tablet    SYNTHROID/LEVOTHROID    90 tablet    Take 1 tablet (25 mcg) by mouth daily       loratadine 10 MG tablet    CLARITIN    30 tablet    Take 1 tablet (10 mg) by mouth daily       methylPREDNISolone 4 MG tablet    MEDROL DOSEPAK    21 tablet    Follow package instructions       metoprolol 25 MG tablet    LOPRESSOR     Take 25 mg by mouth 2 times daily       nitroglycerin 0.4 MG sublingual tablet    NITROSTAT    25 tablet    Place 1 tablet (0.4 mg) under the tongue every 5 minutes as needed for chest pain If you are still having symptoms after 3 doses (15 minutes) call 911.       VITAMIN D PO      None Entered       WOMENS 50+ MULTI VITAMIN/MIN PO      DAILY       XARELTO 20 MG Tabs tablet   Generic drug:  rivaroxaban ANTICOAGULANT      Take 1 tablet (20 mg) by mouth daily (with dinner)

## 2017-03-21 NOTE — PROGRESS NOTES
Subjective:    HPI                    Objective:    System    Physical Exam    General     ROS    Assessment/Plan:      PROGRESS  REPORT    Progress reporting period is from 02/23/2017 to today.       SUBJECTIVE  Subjective: Pt reports she is doing quite well.  Has not had back pain in a while and has been able to use the stairs without issue.  Hasn't been lifting as much d/t bronchitis so hasn't been at work.    Current Pain level: 0/10.     Initial Pain level: 5/10.   Changes in function:  Yes (See Goal flowsheet attached for changes in current functional level)  Adverse reaction to treatment or activity: None    OBJECTIVE  Objective: No discomfort with standing trunk AROM all directions.  Negative SLR, Chidi, thigh thrust B.  No distal strength asymmetry.     ASSESSMENT/PLAN  Updated problem list and treatment plan: Diagnosis 1:  LBP -- home program  STG/LTGs have been met or progress has been made towards goals:  Yes (See Goal flow sheet completed today.)  Assessment of Progress: The patient's condition is improving.  Self Management Plans:  Patient has been instructed in a home treatment program.  I have re-evaluated this patient and find that the nature, scope, duration and intensity of the therapy is appropriate for the medical condition of the patient.  Rosemarie continues to require the following intervention to meet STG and LTG's:  PT intervention is no longer required to meet STG/LTG.    Recommendations:  Given progress, pt agrees no further PT scheduled.  She will return or let me know if there are further issues.  Otherwise will consider her discharged if I haven't heard from her in 60 days.    Please refer to the daily flowsheet for treatment today, total treatment time and time spent performing 1:1 timed codes.

## 2017-05-26 PROBLEM — M54.16 LUMBAR RADICULOPATHY: Status: RESOLVED | Noted: 2017-02-23 | Resolved: 2017-05-26

## 2017-05-26 NOTE — PROGRESS NOTES
See plan 03/21.  Have not heard from pt since and no appts are scheduled.  Consider note from that date to serve as final summary.

## 2017-11-21 ENCOUNTER — TRANSFERRED RECORDS (OUTPATIENT)
Dept: HEALTH INFORMATION MANAGEMENT | Facility: CLINIC | Age: 82
End: 2017-11-21

## 2017-12-07 DIAGNOSIS — T39.95XA ANALGESIC REBOUND HEADACHE: ICD-10-CM

## 2017-12-07 DIAGNOSIS — G44.229 CHRONIC TENSION-TYPE HEADACHE, NOT INTRACTABLE: ICD-10-CM

## 2017-12-07 DIAGNOSIS — G44.40 ANALGESIC REBOUND HEADACHE: ICD-10-CM

## 2017-12-07 NOTE — TELEPHONE ENCOUNTER
Gabapentin      Last Written Prescription Date:  10/18/16  Last Fill Quantity: 270,   # refills: 0  Last Office Visit: 11/01/16  Future Office visit:       Routing refill request to provider for review/approval because:  Drug not on the [facility] or [facility] refill protocol or controlled substance      Patient has a starting dose prescription from [facility], but is changing to our pharmacy.  Please send new RX, or if patient needs to be seen contact her.  Thanks!

## 2017-12-08 RX ORDER — GABAPENTIN 100 MG/1
CAPSULE ORAL
Qty: 270 CAPSULE | Refills: 3 | Status: SHIPPED | OUTPATIENT
Start: 2017-12-08 | End: 2018-05-10

## 2018-02-19 ENCOUNTER — TELEPHONE (OUTPATIENT)
Dept: ORTHOPEDICS | Facility: CLINIC | Age: 83
End: 2018-02-19

## 2018-02-19 NOTE — TELEPHONE ENCOUNTER
Janis LLAMAS wondering if her mother needs to be seen in clinic to get another order for an injection. Would like a call back.

## 2018-02-20 NOTE — TELEPHONE ENCOUNTER
Need to understand nature of her current pain, and the request. Same pain as before? Same type of injection desired?  I think best to have her follow up in clinic first, as her last visit here was over 1 year ago.  Thanks.  Jeramy Dumont DO, CAQ

## 2018-02-26 ENCOUNTER — TELEPHONE (OUTPATIENT)
Dept: PALLIATIVE MEDICINE | Facility: CLINIC | Age: 83
End: 2018-02-26

## 2018-02-26 ENCOUNTER — OFFICE VISIT (OUTPATIENT)
Dept: ORTHOPEDICS | Facility: CLINIC | Age: 83
End: 2018-02-26
Payer: COMMERCIAL

## 2018-02-26 VITALS
DIASTOLIC BLOOD PRESSURE: 76 MMHG | HEIGHT: 63 IN | SYSTOLIC BLOOD PRESSURE: 138 MMHG | BODY MASS INDEX: 31.54 KG/M2 | WEIGHT: 178 LBS

## 2018-02-26 DIAGNOSIS — M54.42 LEFT-SIDED LOW BACK PAIN WITH LEFT-SIDED SCIATICA, UNSPECIFIED CHRONICITY: Primary | ICD-10-CM

## 2018-02-26 DIAGNOSIS — M47.816 FACET ARTHROPATHY, LUMBAR: ICD-10-CM

## 2018-02-26 DIAGNOSIS — M54.16 LUMBAR RADICULOPATHY: ICD-10-CM

## 2018-02-26 PROCEDURE — 99213 OFFICE O/P EST LOW 20 MIN: CPT | Performed by: PEDIATRICS

## 2018-02-26 NOTE — LETTER
"    2/26/2018         RE: Rosemarie Fry  [address]  [place] [address]        Dear Colleague,    Thank you for referring your patient, Rosemarie Fry, to the [facility] [place]. Please see a copy of my visit note below.    Sports Medicine Clinic Visit    PCP: [facility], [facility]    Rosemarie Fry is a 83 year old female who is seen in f/u up for    Left-sided low back pain with left-sided sciatica, unspecified chronicity  Lumbar radiculopathy. Since last visit on 1/27/17 patient has undergone an MRI and was scheduled for an injection on 2/22/17, but did not have the injection due to her being on Xarelto.  She states she \"chickened out\" and did not reschedule the injection.    Per patient she was seen by someone else downtown for her back (unsure, states it is always scheduled by her daughter who is not here today).  Did have a repeat MRI.  It was recommended she have a fusion, but she does not want to have surgery and would like to now have the injection.      Patient is taking gabapentin, but not taking it on a regular basis, states she takes it only when she feels she needs it.     **  Pain in the low back sitting straight up. More comfortable sitting foward. Does have numbness and tingling infrequently.  Does have pain in the low back after exercising, but relieved with resting.   Patient wishes to have Corticosteroid injection of the lumbar spine.     Patient had an ultrasound of the left leg, due to swelling. Instructed to wear compression socks.     ** **  Currently pain is better. She is not having any radicular pain currently. Pain primarily in the low back, when present.     Review of Systems  All other systems reviewed and are negative unless noted above.    This document serves as a record of the services and decisions personally performed and made by Jeramy Dumont DO, CAQ. It was created on his behalf by Madhav Romo, a trained medical scribe. The " "creation of this document is based the provider's statements to the medical scribe.  Madhav Demetrius February 26, 2018 10:23 AM      Past Medical History:   Diagnosis Date     Allergic rhinitis due to animal dander      Atrial fibrillation (H)     Xarelto     Cataract, mild-mod, ou 5/17/2015     Diagnostic skin and sensitization tests (aka ALLERGENS)     Skin test 5/5/10 pos. for:  cat/dog/T/RW     Ex-smoker      Lyme disease 1999     Giancarlo     Osteoporosis      Seasonal allergic rhinitis skin test 5/5/10 pos. for:  cat/dog/T/RW     Past Surgical History:   Procedure Laterality Date     C APPENDECTOMY       LAPAROSCOPIC ASSISTED HYSTERECTOMY VAGINAL         Objective  /76  Ht 5' 2.5\" (1.588 m)  Wt 178 lb (80.7 kg)  BMI 32.04 kg/m2    GENERAL APPEARANCE: healthy, alert and no distress   GAIT: NORMAL  SKIN: no suspicious lesions or rashes  NEURO: Normal strength and tone, mentation intact and speech normal  PSYCH:  mentation appears normal and affect normal/bright  HEENT: no scleral icterus  CV: no extremity edema   RESP: nonlabored breathing     Exam  Low back exam:    Inspection:     no visible deformity in the low back       normal skin       normal vascular       normal lymphatic    ROM:        flexion - hands to floor (with bending knees), no pain        extension - no pain        Lateral flexion full no pain        Lateral rotation - pain lateral low back bilaterally     Strength:     hip flexion        knee extension        ankle dorsiflexion        ankle plantarflexion        dorsiflexion of the great toe        Knee flexion        No pain with above   Grossly full, symmetric, intact    Reflexes:     patellar (L3, L4) symmetric normal       achilles tendons (S1) symmetric normal    Sensation:    grossly intact throughout lower extremities    Special tests:             slump test negative bilaterally      Radiology  Visualized MRI of the lumbar spine from 10/12/2017, and reviewed images and report " with patient.  MRI demonstrates multilevel degenerative change, including facet arthrosis.    Final Report  MR *MR LUMBAR SPINE wo CONTRAST  Show Printer-Friendly Version with Images (4 of 4)   Show Printer-Friendly Version without images  Patient Name:  Rosemarie Fry     :       ID:  [id number]([facility])     Study Date:  Oct- 12:03        Clinical History: Lumbar spine pain. Left lower extremity   radiculopathy.     Technique: Multiplanar multisequence lumbar spine MRI without   contrast.      Comparison:  None.     Findings: Upper endplate compression fractures at T11, T12 and L1   appear well-healed. 15% height loss at T11, 25% height loss at T12   and up to 70%  height loss at L1 noted. Slight kyphosis centered at   T12-L1. No evidence for acute or subacute fracture. No suspicious   marrow signal replacement.      Normal cauda equina and conus medullaris. Sacral canal perineural   cysts incidentally noted.     Bilateral renal cyst-like lesions are present. Mild SI joint   degenerative changes bilaterally.     T10-11: Minimal disc bulge. Patent central canal and foramina.     T11-12: Mild disc bulge. Patent central canal and foramen.     T12-L1: Mild to moderate broad-based disc bulge. Patent central   canal. Bilateral neural foraminal perineural cysts.     L1-2: Disc desiccation. Patent central canal and foramina. Perineural   cyst along the exiting right L1 nerve.     L2-3: Patent central canal and foramina.  Perineural cyst noted along the exiting right L2 nerve.     L3-4: Disc desiccation and mild posterior disc bulge. Patent central   canal and foramina.     L4-5: Disc desiccation, height loss and mild posterior disc bulge.   Facet and ligamentous degenerative changes left side more so than   right. There is borderline narrowing of the left lateral recess.   Central canal is adequate. Moderate left and minimal right foraminal   stenosis. Slight contact upon the exiting left L4 nerve.      L5-S1: 7 mm degenerative anterolisthesis. Disc desiccation, height   loss and mild disc bulge. Severe facet and ligamentous degenerative   changes. Moderately severe central canal stenosis. Lateral recesses   is narrowed. Moderate left and mild-to-moderate right foraminal   stenosis. L5 nerve contact is present left side more so than right.   This may correlate with the patient's left-sided radicular symptoms.     Impression: Multilevel lumbar spondylosis most pronounced at L5-S1.     Signed by: Duncan Cheng Signed on: Oct- 16:18   =========================================  Reviewed prior MRI:     Results for orders placed or performed in visit on 01/31/17   MR Lumbar Spine w/o Contrast    Narrative    MRI LUMBAR SPINE WITHOUT CONTRAST January 31, 2017 1:04 PM     HISTORY: Left lumbar radiculopathy. Lumbago with sciatica, left side.    TECHNIQUE: Multiplanar multisequence MRI of the lumbar spine without  contrast.    COMPARISON: X-ray from 1/27/2017.    FINDINGS: The report is dictated assuming five lumbar-type vertebral  bodies.  Chronic superior endplate compression fracture L1 with loss  of approximately 50% of central and anterior vertebral body height.  Schmorl's nodes at the lower thoracic levels imaged. Bone marrow  signal is unremarkable. Tip of the conus medullaris and cauda equina  are unremarkable. Axial scans were performed from T11 to sacrum.    T11-T12: Mild disc bulge without stenosis.    T12-L1: Mild disc bulge and osteophytic ridging. Mild central  stenosis. Neural foramen are patent.    L1-L2: Mild disc bulge. No central stenosis. Neural foramen are  patent. Facet joints are unremarkable.    L2-L3: Mild disc bulge. No central stenosis. Neural foramen are  patent. Facet joints are unremarkable.    L3-L4: Mild disc bulge. No central stenosis. Neural foramen are  patent. Facet joints are unremarkable.    L4-L5: Broad-based disc bulge. Mild left facet degenerative changes.  Mild central  stenosis. Mild right and mild to moderate left foraminal  stenosis.    L5-S1: Prominent facet hypertrophy. Mild grade 1 degenerative  spondylolisthesis. Broad-based disc bulge. Moderate central stenosis.  Right neural foramen is patent. Moderate left foraminal stenosis.    Paraspinous soft tissues: Multiple nerve root sleeve cysts seen  throughout.      Impression    IMPRESSION:    1. At T12-L1 there is mild central stenosis.  2. At L4-L5 there is mild central stenosis. Mild right and mild to  moderate left foraminal stenosis.  3. At L5-S1 there is grade 1 degenerative spondylolisthesis with  moderate central stenosis. Moderate left foraminal stenosis.    EZEQUIEL URBAN MD         Assessment:  1. Left-sided low back pain with left-sided sciatica, unspecified chronicity    2. Lumbar radiculopathy    3. Facet arthropathy, lumbar    previously radicular pain, now appears more related to lumbar degenerative change.    Plan:  Discussed the assessment with the patient.    Pertinent imaging of the area reviewed with the patient.    Discussed:  *Symptom Treatment - Ice, Over the Counter Medications   *Activity Modification   *Injection - Corticosteroid injection through pain management    We can consider injection, though on exam at visit her pain was improved. She is concerned about pain at other times, and appears related to degenerative change.  *Medication - oral steroid   *Rehab - return to Physical Therapy for refresher visit      Topical Treatments: Ice prn   Over the counter medication: Patient's preferred OTC medication as directed on packaging.  Activity Modification: as discussed   Declined return to PT.  Referred to Pain Management for likely Facet injection; next step   Follow up: in 2-3 weeks after Corticosteroid injection   Questions answered. The patient indicates understanding of these issues and agrees with the plan.     Jeramy Dumont DO, CAQ      Disclaimer: This note consists of symbols derived from  keyboarding, dictation and/or voice recognition software. As a result, there may be errors in the script that have gone undetected. Please consider this when interpreting information found in this chart.    The information in this document, created by the medical scribe for me, accurately reflects the services I personally performed and the decisions made by me. I have reviewed and approved this document for accuracy.   Jeramy Dumont DO, CASTRO      Again, thank you for allowing me to participate in the care of your patient.        Sincerely,        Jeramy Dumont DO

## 2018-02-26 NOTE — TELEPHONE ENCOUNTER
Pre-screening Questions for Radiology Injections:    Injection to be done at which interventional clinic site? [facility]    Procedure ordered by Dr. Dumont    Procedure ordered? Lumbar Facet Joint Injection    What insurance would patient like us to bill for this procedure? Medicare, [employer]      Worker's comp or MVA (motor vehicle accident) -Any injection DO NOT SCHEDULE and route to Zainab Moore.      FOCUS [employer] insurance - For SI joint injections, DO NOT SCHEDULE and route Argelia Teresa. Pili Pop FREEDOM NO PA REQUIRED EFFECTIVE 11/1/2017      HEALTH [employer]- MBB's must be scheduled at LEAST two weeks apart      [employer] - Any injection besides hip/shoulder/knee joint DO NOT SCHEDULE and route to Argelia Teresa. She will obtain PA and call pt back to schedule procedure or notify pt of denial.       HP C[employer]-PA REQUIRED FOR NON-DANIA OR Joint injections    Any chance of pregnancy? NO   If YES, do NOT schedule and route to RN pool    Is an  needed? No     Patient has a drive home? (mandatory) YES: INFORMED    Is patient taking any blood thinners (plavix, coumadin, jantoven, warfarin, heparin, pradaxa or dabigatran )? Yes - Xarelto - no hold if scheduled with Dr. Wiley or Bettie Moore  If hold needed, do NOT schedule, route to RN pool     Is patient taking any aspirin products? No     If more than 325mg/day do NOT schedule; route to RN pool     For CERVICAL procedures, hold all aspirin products for 6 days.      Does the patient have a bleeding or clotting disorder? No     If YES, okay to schedule AND route to RN nurse pool    **For any patients with platelet count <100, must be forwarded to provider**    Is patient diabetic?  No  If YES, have them bring their glucometer.    Does patient have an active infection or treated for one within the past week? No     Is patient currently taking any antibiotics?  No     For patients on chronic, preventative, or prophylactic  antibiotics, procedures may be scheduled.     For patients on antibiotics for active or recent infection:    Jose Enrique Howard Burton, Snitzer-antibiotic course must have been completed for 4 days    Dr. Garcia-antibiotic course must have been completed for 7 days    Is patient currently taking any steroid medications? (i.e. Prednisone, Medrol)  No     For patients on steroid medications:    Jose Enrique Howard Burton, Snitzer-steroid course must have been completed for 4 days    -steroid course must have been completed for 7 days    Reviewed with patient:  If you are started on any steroids or antibiotics between now and your appointment, you must contact us because it may affect our ability to perform your procedure.  Yes    Is patient actively being treated for cancer or immunocompromised? No  If YES, do NOT schedule and route to RN pool     Are you able to get on and off an exam table with minimal or no assistance? Yes  If NO, do NOT schedule and route to RN pool    Are you able to roll over and lay on your stomach with minimal or no assistance? Yes  If NO, do NOT schedule and route to RN pool     Any allergies to contrast dye, iodine, shellfish, or numbing and steroid medications? No  If YES, route to RN pool AND add allergy information to appointment notes    Allergies: Sulfa drugs      Has the patient had a flu shot or any other vaccinations within 7 days before or after the procedure.  No     Does patient have an MRI/CT?  YES: MRI  (SI joint, hip injections, lumbar sympathetic blocks, and stellate ganglion blocks do not require an MRI)    Was the MRI done w/in the last 3 years?  Yes    Was MRI done at [facility]? Yes      If not, where was it done? N/A       If MRI was not done at [facility], [facility] or [facility] do NOT schedule and route to nursing.  If pt has an imaging disc, the injection may be scheduled but pt has to bring disc to appt. If they show up w/out disc the injection cannot be  done    Reminders (please tell patient if applicable):       Instructed pt to arrive 30 minutes early for IV start if this is for a cervical procedure, ALL sympathetic (stellate ganglion, hypogastric, or lumbar sympathetic block) and all sedation procedures (RFA, spinal cord stimulation trials).  Not Applicable   -IVs are not routinely placed for Dr. Parrish cervical cases   -Dr. Wiley: IVs for cervical ESIs and cervical TBDs (not CMBBs/facet inj)      If NPO for sedation, informed patient that it is okay to take medications with sips of water (except if they are to hold blood thinners).  Not Applicable   *DO take blood pressure medication if it is prescribed*      If this is for a cervical DANIA, informed patient that aspirin needs to be held for 6 days.   Not Applicable      For all patients not having spinal cord stimulator (SCS) trials or radiofrequency ablations (RFAs), informed patient:    IV sedation is not provided for this procedure.  If you feel that an oral anti-anxiety medication is needed, you can discuss this further with your referring provider or primary care provider.  The Pain Clinic provider will discuss specifics of what the procedure includes at your appointment.  Most procedures last 10-20 minutes.  We use numbing medications to help with any discomfort during the procedure.  Not Applicable      Do not schedule procedures requiring IV placement in the first appointment of the day or first appointment after lunch. N/A      For patients 85 or older we recommend having an adult stay w/ them for the remainder of the day.   N/A    Does the patient have any questions?  NO  Valerie Ellis  [facility]

## 2018-02-26 NOTE — PROGRESS NOTES
"Sports Medicine Clinic Visit    PCP: Kiko, Bayard Alexis    Rosemarie Fry is a 83 year old female who is seen in f/u up for    Left-sided low back pain with left-sided sciatica, unspecified chronicity  Lumbar radiculopathy. Since last visit on 1/27/17 patient has undergone an MRI and was scheduled for an injection on 2/22/17, but did not have the injection due to her being on Xarelto.  She states she \"chickened out\" and did not reschedule the injection.    Per patient she was seen by someone else downtown for her back (unsure, states it is always scheduled by her daughter who is not here today).  Did have a repeat MRI.  It was recommended she have a fusion, but she does not want to have surgery and would like to now have the injection.      Patient is taking gabapentin, but not taking it on a regular basis, states she takes it only when she feels she needs it.     **  Pain in the low back sitting straight up. More comfortable sitting foward. Does have numbness and tingling infrequently.  Does have pain in the low back after exercising, but relieved with resting.   Patient wishes to have Corticosteroid injection of the lumbar spine.     Patient had an ultrasound of the left leg, due to swelling. Instructed to wear compression socks.     ** **  Currently pain is better. She is not having any radicular pain currently. Pain primarily in the low back, when present.     Review of Systems  All other systems reviewed and are negative unless noted above.    This document serves as a record of the services and decisions personally performed and made by Jeramy Dumont DO, CAQ. It was created on his behalf by Madhav Romo, a trained medical scribe. The creation of this document is based the provider's statements to the medical scribe.  Madhav Romo February 26, 2018 10:23 AM      Past Medical History:   Diagnosis Date     Allergic rhinitis due to animal dander      Atrial fibrillation (H)     Xarelto     " "Cataract, mild-mod, ou 5/17/2015     Diagnostic skin and sensitization tests (aka ALLERGENS)     Skin test 5/5/10 pos. for:  cat/dog/T/RW     Ex-smoker      Lyme disease 1999     Giancarlo     Osteoporosis      Seasonal allergic rhinitis skin test 5/5/10 pos. for:  cat/dog/T/RW     Past Surgical History:   Procedure Laterality Date     C APPENDECTOMY       LAPAROSCOPIC ASSISTED HYSTERECTOMY VAGINAL         Objective  /76  Ht 5' 2.5\" (1.588 m)  Wt 178 lb (80.7 kg)  BMI 32.04 kg/m2    GENERAL APPEARANCE: healthy, alert and no distress   GAIT: NORMAL  SKIN: no suspicious lesions or rashes  NEURO: Normal strength and tone, mentation intact and speech normal  PSYCH:  mentation appears normal and affect normal/bright  HEENT: no scleral icterus  CV: no extremity edema   RESP: nonlabored breathing     Exam  Low back exam:    Inspection:     no visible deformity in the low back       normal skin       normal vascular       normal lymphatic    ROM:        flexion - hands to floor (with bending knees), no pain        extension - no pain        Lateral flexion full no pain        Lateral rotation - pain lateral low back bilaterally     Strength:     hip flexion        knee extension        ankle dorsiflexion        ankle plantarflexion        dorsiflexion of the great toe        Knee flexion        No pain with above   Grossly full, symmetric, intact    Reflexes:     patellar (L3, L4) symmetric normal       achilles tendons (S1) symmetric normal    Sensation:    grossly intact throughout lower extremities    Special tests:             slump test negative bilaterally      Radiology  Visualized MRI of the lumbar spine from 10/12/2017, and reviewed images and report with patient.  MRI demonstrates multilevel degenerative change, including facet arthrosis.    Final Report  MR *MR LUMBAR SPINE wo CONTRAST  Show Printer-Friendly Version with Images (4 of 4)   Show Printer-Friendly Version without images  Patient Name:  " Rosemarie Fry     :       ID:  [id number]([facility])     Study Date:  Oct- 12:03        Clinical History: Lumbar spine pain. Left lower extremity   radiculopathy.     Technique: Multiplanar multisequence lumbar spine MRI without   contrast.      Comparison:  None.     Findings: Upper endplate compression fractures at T11, T12 and L1   appear well-healed. 15% height loss at T11, 25% height loss at T12   and up to 70%  height loss at L1 noted. Slight kyphosis centered at   T12-L1. No evidence for acute or subacute fracture. No suspicious   marrow signal replacement.      Normal cauda equina and conus medullaris. Sacral canal perineural   cysts incidentally noted.     Bilateral renal cyst-like lesions are present. Mild SI joint   degenerative changes bilaterally.     T10-11: Minimal disc bulge. Patent central canal and foramina.     T11-12: Mild disc bulge. Patent central canal and foramen.     T12-L1: Mild to moderate broad-based disc bulge. Patent central   canal. Bilateral neural foraminal perineural cysts.     L1-2: Disc desiccation. Patent central canal and foramina. Perineural   cyst along the exiting right L1 nerve.     L2-3: Patent central canal and foramina.  Perineural cyst noted along the exiting right L2 nerve.     L3-4: Disc desiccation and mild posterior disc bulge. Patent central   canal and foramina.     L4-5: Disc desiccation, height loss and mild posterior disc bulge.   Facet and ligamentous degenerative changes left side more so than   right. There is borderline narrowing of the left lateral recess.   Central canal is adequate. Moderate left and minimal right foraminal   stenosis. Slight contact upon the exiting left L4 nerve.     L5-S1: 7 mm degenerative anterolisthesis. Disc desiccation, height   loss and mild disc bulge. Severe facet and ligamentous degenerative   changes. Moderately severe central canal stenosis. Lateral recesses   is narrowed. Moderate left and mild-to-moderate  right foraminal   stenosis. L5 nerve contact is present left side more so than right.   This may correlate with the patient's left-sided radicular symptoms.     Impression: Multilevel lumbar spondylosis most pronounced at L5-S1.     Signed by: Duncan Cehng Signed on: Oct- 16:18   =========================================  Reviewed prior MRI:     Results for orders placed or performed in visit on 01/31/17   MR Lumbar Spine w/o Contrast    Narrative    MRI LUMBAR SPINE WITHOUT CONTRAST January 31, 2017 1:04 PM     HISTORY: Left lumbar radiculopathy. Lumbago with sciatica, left side.    TECHNIQUE: Multiplanar multisequence MRI of the lumbar spine without  contrast.    COMPARISON: X-ray from 1/27/2017.    FINDINGS: The report is dictated assuming five lumbar-type vertebral  bodies.  Chronic superior endplate compression fracture L1 with loss  of approximately 50% of central and anterior vertebral body height.  Schmorl's nodes at the lower thoracic levels imaged. Bone marrow  signal is unremarkable. Tip of the conus medullaris and cauda equina  are unremarkable. Axial scans were performed from T11 to sacrum.    T11-T12: Mild disc bulge without stenosis.    T12-L1: Mild disc bulge and osteophytic ridging. Mild central  stenosis. Neural foramen are patent.    L1-L2: Mild disc bulge. No central stenosis. Neural foramen are  patent. Facet joints are unremarkable.    L2-L3: Mild disc bulge. No central stenosis. Neural foramen are  patent. Facet joints are unremarkable.    L3-L4: Mild disc bulge. No central stenosis. Neural foramen are  patent. Facet joints are unremarkable.    L4-L5: Broad-based disc bulge. Mild left facet degenerative changes.  Mild central stenosis. Mild right and mild to moderate left foraminal  stenosis.    L5-S1: Prominent facet hypertrophy. Mild grade 1 degenerative  spondylolisthesis. Broad-based disc bulge. Moderate central stenosis.  Right neural foramen is patent. Moderate left foraminal  stenosis.    Paraspinous soft tissues: Multiple nerve root sleeve cysts seen  throughout.      Impression    IMPRESSION:    1. At T12-L1 there is mild central stenosis.  2. At L4-L5 there is mild central stenosis. Mild right and mild to  moderate left foraminal stenosis.  3. At L5-S1 there is grade 1 degenerative spondylolisthesis with  moderate central stenosis. Moderate left foraminal stenosis.    EZEQUIEL URBAN MD         Assessment:  1. Left-sided low back pain with left-sided sciatica, unspecified chronicity    2. Lumbar radiculopathy    3. Facet arthropathy, lumbar    previously radicular pain, now appears more related to lumbar degenerative change.    Plan:  Discussed the assessment with the patient.    Pertinent imaging of the area reviewed with the patient.    Discussed:  *Symptom Treatment - Ice, Over the Counter Medications   *Activity Modification   *Injection - Corticosteroid injection through pain management    We can consider injection, though on exam at visit her pain was improved. She is concerned about pain at other times, and appears related to degenerative change.  *Medication - oral steroid   *Rehab - return to Physical Therapy for refresher visit      Topical Treatments: Ice prn   Over the counter medication: Patient's preferred OTC medication as directed on packaging.  Activity Modification: as discussed   Declined return to PT.  Referred to Pain Management for likely Facet injection; next step   Follow up: in 2-3 weeks after Corticosteroid injection   Questions answered. The patient indicates understanding of these issues and agrees with the plan.     Jeramy Dumont, , CAQ      Disclaimer: This note consists of symbols derived from keyboarding, dictation and/or voice recognition software. As a result, there may be errors in the script that have gone undetected. Please consider this when interpreting information found in this chart.    The information in this document, created by the  medical scribe for me, accurately reflects the services I personally performed and the decisions made by me. I have reviewed and approved this document for accuracy.   Jeramy Dumont DO, CAQ

## 2018-02-26 NOTE — TELEPHONE ENCOUNTER
Left VM for patient to schedule Lumbar facet joint injection.        Argelia FRANCIS    [facility]

## 2018-02-26 NOTE — MR AVS SNAPSHOT
After Visit Summary   2/26/2018    Rosemarie Fry    MRN: [id number]           Patient Information     Date Of Birth          11/7/1934        Visit Information        Provider Department      2/26/2018 9:40 AM Jeramy Dumont,  [facility]        Today's Diagnoses     Left-sided low back pain with left-sided sciatica, unspecified chronicity    -  1    Lumbar radiculopathy        Facet arthropathy, lumbar           Follow-ups after your visit        Additional Services     [facility] PT, HAND, AND CHIROPRACTIC REFERRAL       **This order will print in the [facility] Scheduling Office**    Physical Therapy, Hand Therapy and Chiropractic Care are available through:    *[facility]  *[facility]  *[facility]    Call one number to schedule at any of the above locations: [phone].    Your provider has referred you to: Physical Therapy at [facility] or [facility]    Indication/Reason for Referral: Low Back Pain  Onset of Illness:   Therapy Orders: Evaluate and Treat  Special Programs: None  Special Request: None    Malou Westfall      Additional Comments for the Therapist or Chiropractor:       Please be aware that coverage of these services is subject to the terms and limitations of your health insurance plan.  Call member services at your health plan with any benefit or coverage questions.      Please bring the following to your appointment:    *Your personal calendar for scheduling future appointments  *Comfortable clothing            PAIN MANAGEMENT REFERRAL       Your provider has referred you to: [facility]: [facility] -    Reason for Referral: Procedure Order Facet Procedure:  Steroid Injection - Lumbar      What is your diagnosis for the patient's pain? Left sided low back pain, facet arthropathy       For any questions, contact the [facility] at [phone].     **ANY DIAGNOSTIC TESTS THAT  ARE NOT IN EPIC SHOULD BE SENT TO THE PAIN CENTER**    REGARDING OPIOID MEDICATIONS:  The discussion of opioids management, appropriateness of therapy, and dosing will be discussed in patients being seen for evaluation.  The pain management clinics are not long-term prescribing clinics, with transition of prescribing of medications ultimately going back to the referring provider/PCP.  If prescribing is taken over at the pain clinic, it is in actively involved patients whom are appropriate for opioids, urine drug screening is completed, and long-term prescribing plan has been determined.  Therefore, we will not be automatically taking over prescribing at the patient's first visit.  Is this agreeable to you? agrees.     Please be aware that coverage of these services is subject to the terms and limitations of your health insurance plan.  Call member services at your health plan with any benefit or coverage questions.      Please bring the following with you to your appointment:    (1) Any X-Rays, CTs or MRIs which have been performed.  Contact the facility where they were done to arrange for  prior to your scheduled appointment.    (2) List of current medications   (3) This referral request   (4) Any documents/labs given to you for this referral                  Who to contact     If you have questions or need follow up information about today's clinic visit or your schedule please contact [facility] directly at [phone].  Normal or non-critical lab and imaging results will be communicated to you by MyChart, letter or phone within 4 business days after the clinic has received the results. If you do not hear from us within 7 days, please contact the clinic through MyChart or phone. If you have a critical or abnormal lab result, we will notify you by phone as soon as possible.  Submit refill requests through [employer] or call your pharmacy and they will forward the refill request  "to us. Please allow 3 business days for your refill to be completed.          Additional Information About Your Visit        "[employer]hart Information     [employer] lets you send messages to your doctor, view your test results, renew your prescriptions, schedule appointments and more. To sign up, go to [email or web address] . Click on \"Log in\" on the left side of the screen, which will take you to the Welcome page. Then click on \"Sign up Now\" on the right side of the page.     You will be asked to enter the access code listed below, as well as some personal information. Please follow the directions to create your username and password.     Your access code is: [email or web address]  Expires: 2018 10:44 AM     Your access code will  in 90 days. If you need help or a new code, please call your [place] clinic or [phone].        Care EveryWhere ID     This is your Care EveryWhere ID. This could be used by other organizations to access your [place] medical records  [id number]        Your Vitals Were     Height BMI (Body Mass Index)                5' 2.5\" (1.588 m) 32.04 kg/m2           Blood Pressure from Last 3 Encounters:   18 138/76   17 (!) 148/91   17 130/72    Weight from Last 3 Encounters:   18 178 lb (80.7 kg)   17 168 lb (76.2 kg)   16 168 lb 3.2 oz (76.3 kg)              We Performed the Following     OP[facility] PT, HAND, AND CHIROPRACTIC REFERRAL     PAIN MANAGEMENT REFERRAL        Primary Care Provider Office Phone # Fax #    [facility] [phone]       [address] [facility] [id number]        Equal Access to Services     [place] [employer] : Eliseo Johnosn, flori espinoza, qaybta kaalmadayana coleman, mendoza tomas. [facility] [phone].    ATENCIÓN: Si habla español, tiene a liu disposición servicios gratuitos de asistencia lingüística. Llefren al [phone].    We comply with applicable federal civil " rights laws and [place] laws. We do not discriminate on the basis of race, color, national origin, age, disability, sex, sexual orientation, or gender identity.            Thank you!     Thank you for choosing [facility]  for your care. Our goal is always to provide you with excellent care. Hearing back from our patients is one way we can continue to improve our services. Please take a few minutes to complete the written survey that you may receive in the mail after your visit with us. Thank you!             Your Updated Medication List - Protect others around you: Learn how to safely use, store and throw away your medicines at www.disposemymeds.org.          This list is accurate as of 2/26/18 10:44 AM.  Always use your most recent med list.                   Brand Name Dispense Instructions for use Diagnosis    budesonide-formoterol 160-4.5 MCG/ACT Inhaler    SYMBICORT    1 Inhaler    Inhale 2 puffs into the lungs 2 times daily    Bronchospasm       CALCIUM PO      none given        FISH OIL PO      none given        Flax Oil      None Entered        gabapentin 100 MG capsule    NEURONTIN    270 capsule    Week 1: 100 mg in the evening. Week 2: 100 mg times a day. Week 3 and afterwards : 100 mg three times a day PATIENT SAYS SHE WILL TAKE MEDICATION WHEN NEEDED BECAUSE SHE IS LEARY ABOUT THIS MEDICATION.    Analgesic rebound headache, Chronic tension-type headache, not intractable       GLUCOSAMINE HCL PO      None Entered        levothyroxine 25 MCG tablet    SYNTHROID/LEVOTHROID    90 tablet    Take 1 tablet (25 mcg) by mouth daily    Subclinical hypothyroidism       loratadine 10 MG tablet    CLARITIN    30 tablet    Take 1 tablet (10 mg) by mouth daily    Seasonal allergic rhinitis       metoprolol tartrate 25 MG tablet    LOPRESSOR     Take 25 mg by mouth 2 times daily        nitroGLYcerin 0.4 MG sublingual tablet    NITROSTAT    25 tablet    Place 1 tablet (0.4 mg) under the  tongue every 5 minutes as needed for chest pain If you are still having symptoms after 3 doses (15 minutes) call 911.    Atypical chest pain       VITAMIN D PO      None Entered        WOMENS 50+ MULTI VITAMIN/MIN PO      DAILY        XARELTO 20 MG Tabs tablet   Generic drug:  rivaroxaban ANTICOAGULANT      Take 1 tablet (20 mg) by mouth daily (with dinner)    Chronic atrial fibrillation (H)

## 2018-03-22 NOTE — PROGRESS NOTES
Pre procedure Diagnosis: lumbar facet arthropathy, lumbar spondylosis   Post procedure Diagnosis: Same  Procedure performed: bilateral L5-S1 facet joint injections, fluoroscopically guided, contrast controlled  Indication:  Therapeutic for pain  Anesthesia: none  Complication:  none  Operators: Madhav Wiley MD     Indications: On xarelto   Rosemarie Fry is a 83 year old female was sent by Dr. Willson for bilateral facet joint injection.  The patient has a history of axial lbp.  Exam shows + kemps bilat reproduces her pain.  They have tried conservative treatment including meds/pt.    Options/alternatives, benefits and risks were discussed with the patient including bleeding, infection, flared pain, tissue trauma, exposure to radiation, reaction to medications including seizure, spinal cord injury, paralysis, weakness, numbness and headache.     Questions were answered to her satisfaction and she wishes to proceed. Voluntary informed consent was obtained and signed.   T10-11: Minimal disc bulge. Patent central canal and foramina.      T11-12: Mild disc bulge. Patent central canal and foramen.      T12-L1: Mild to moderate broad-based disc bulge. Patent central   canal. Bilateral neural foraminal perineural cysts.      L1-2: Disc desiccation. Patent central canal and foramina. Perineural   cyst along the exiting right L1 nerve.      L2-3: Patent central canal and foramina.  Perineural cyst noted along the exiting right L2 nerve.      L3-4: Disc desiccation and mild posterior disc bulge. Patent central   canal and foramina.      L4-5: Disc desiccation, height loss and mild posterior disc bulge.   Facet and ligamentous degenerative changes left side more so than   right. There is borderline narrowing of the left lateral recess.   Central canal is adequate. Moderate left and minimal right foraminal   stenosis. Slight contact upon the exiting left L4 nerve.      L5-S1: 7 mm degenerative anterolisthesis. Disc  desiccation, height   loss and mild disc bulge. Severe facet and ligamentous degenerative   changes. Moderately severe central canal stenosis. Lateral recesses   is narrowed. Moderate left and mild-to-moderate right foraminal   stenosis. L5 nerve contact is present left side more so than right.   This may correlate with the patient's left-sided radicular symptoms.      Impression: Multilevel lumbar spondylosis most pronounced at L5-S1.   Vitals were reviewed: Yes  Allergies were reviewed:  Yes   Medications were reviewed:  Yes   Pre-procedure pain score: 0/10    Procedure:  After obtaining signed informed consent, the patient was brought into the procedure suite and was placed in a prone position on the procedure table.   A Pause for the Cause was performed.  The patient was prepped and draped in the usual sterile fashion.     Under AP fluoroscopic guidance the bilateral L5-S1 facet joints were identified, and the C-arm was rotated obliquely to the affected side to open the joint space. A total of 6 ml of 1% lidocaine was injected at the needle entry point and needle tract. Then a 22 gauge 5 inch spinal needle was inserted and advanced under fluoroscopic guidance targeting the superior articular pillar of each joint. Once the needle made a contact with SAP, it was rotated and was then advanced into the joint.    AP fluoroscopic views were obtained to confirm the needle placement. Then,  Omnipaque 1 contrast dye was injected after negative aspiration for heme and CSF in each joint, confirming appropriate placement.  A total of 1ml of Omnipaque was used.  Omnipaque wasted:  9 ml.    Then, each joint was injected with 1.5 ml of a combination of [id number] mg and 0.5% bupivacaine 1 ml and the needles were flushed with local anesthetic as they were withdrawn.       Hemostasis was achieved, the area was cleaned, and bandaids were placed when appropriate.  The patient tolerated the procedure well, and was taken to the  recovery room.    Images were saved to PACS.    Post-procedure pain score: 0/10  Follow-up includes:   -f/u phone call in one week  -f/u with referring provider    Madhav Wiley MD  [facility] [place]

## 2018-03-23 ENCOUNTER — RADIOLOGY INJECTION OFFICE VISIT (OUTPATIENT)
Dept: PALLIATIVE MEDICINE | Facility: CLINIC | Age: 83
End: 2018-03-23
Payer: COMMERCIAL

## 2018-03-23 ENCOUNTER — RADIANT APPOINTMENT (OUTPATIENT)
Dept: RADIOLOGY | Facility: CLINIC | Age: 83
End: 2018-03-23
Attending: PAIN MEDICINE
Payer: COMMERCIAL

## 2018-03-23 VITALS — DIASTOLIC BLOOD PRESSURE: 96 MMHG | OXYGEN SATURATION: 100 % | SYSTOLIC BLOOD PRESSURE: 114 MMHG | HEART RATE: 72 BPM

## 2018-03-23 DIAGNOSIS — M47.816 FACET ARTHROPATHY, LUMBAR: ICD-10-CM

## 2018-03-23 PROCEDURE — 64493 INJ PARAVERT F JNT L/S 1 LEV: CPT | Mod: 50 | Performed by: PAIN MEDICINE

## 2018-03-23 ASSESSMENT — PAIN SCALES - GENERAL: PAINLEVEL: NO PAIN (0)

## 2018-03-23 NOTE — MR AVS SNAPSHOT
After Visit Summary   3/23/2018    Rosemarie Fry    MRN: [id number]           Patient Information     Date Of Birth          11/7/1934        Visit Information        Provider Department      3/23/2018 9:15 AM Madhav Wiley MD [facility]        Care Instructions    Restart your xarelto 24 hours after procedure-  3/24/18 after 1015am    [facility]   Procedure Discharge Instructions    Today you saw: Dr. Madhav Wiley    You had an:  Epidural steroid injection      facet joint injection       Medications used:  Lidocaine   Bupivacaine   Dexamethasone   Omnipaque            Be cautious when walking. Numbness and/or weakness in the lower extremities may occur for up to 6-8 hours after the procedure due to effect of the local anesthetic    Do not drive for 6 hours. The effect of the local anesthetic could slow your reflexes.     You may resume your regular activities after 24 hours    Avoid strenuous activity for the first 24 hours    You may shower, however avoid swimming, tub baths or hot tubs for 24 hours following your procedure    You may have a mild to moderate increase in pain for several days following the injection.    It may take up to 14 days for the steroid medication to start working although you may feel the effect as early as a few days after the procedure.       You may use ice packs for 10-15 minutes, 3 to 4 times a day at the injection site for comfort    Do not use heat to painful areas for 6 to 8 hours. This will give the local anesthetic time to wear off and prevent you from accidentally burning your skin.     You may use anti-inflammatory medications (such as Ibuprofen or Aleve or Advil) or Tylenol for pain control if necessary    If you were fasting, you may resume your normal diet and medications after the procedure    If you have diabetes, check your blood sugar more frequently than usual as your blood sugar may be higher than  "normal for 10-14 days following a steroid injection. Contact your doctor who manages your diabetes if your blood sugar is higher than usual    If you experience any of the following, call the pain center nursing line during work hours at [phone] or the after hours provider line at [phone]:  -Fever over 100 degree F  -Swelling, bleeding, redness, drainage, warmth at the injection site  -Progressive weakness or numbness in your legs or arms  -Loss of bowel or bladder function  -Unusual headache that is not relieved by Tylenol or other pain reliever  -Unusual new onset of pain that is not improving      Phone #s:  Appointment line: [phone];  Nurse line: [phone]              Follow-ups after your visit        Who to contact     If you have questions or need follow up information about today's clinic visit or your schedule please contact [facility] directly at [phone].  Normal or non-critical lab and imaging results will be communicated to you by dMetricshart, letter or phone within 4 business days after the clinic has received the results. If you do not hear from us within 7 days, please contact the clinic through dMetricshart or phone. If you have a critical or abnormal lab result, we will notify you by phone as soon as possible.  Submit refill requests through [employer] or call your pharmacy and they will forward the refill request to us. Please allow 3 business days for your refill to be completed.          Additional Information About Your Visit        [employer] Information     [employer] lets you send messages to your doctor, view your test results, renew your prescriptions, schedule appointments and more. To sign up, go to [email or web address] . Click on \"Log in\" on the left side of the screen, which will take you to the Welcome page. Then click on \"Sign up Now\" on the right side of the page.     You will be asked to enter the access code listed below, as well as some personal information. " Please follow the directions to create your username and password.     Your access code is: [email or web address]  Expires: 2018 11:44 AM     Your access code will  in 90 days. If you need help or a new code, please call your [place] clinic or [phone].        Care EveryWhere ID     This is your Care EveryWhere ID. This could be used by other organizations to access your [place] medical records  [id number]        Your Vitals Were     Pulse Pulse Oximetry                72 100%           Blood Pressure from Last 3 Encounters:   18 (!) 114/96   18 138/76   17 (!) 148/91    Weight from Last 3 Encounters:   18 80.7 kg (178 lb)   17 76.2 kg (168 lb)   16 76.3 kg (168 lb 3.2 oz)              Today, you had the following     No orders found for display       Primary Care Provider Office Phone # Fax #    [facility] [phone]       [address] [facility] [address]        Equal Access to Services     [facility] : Hadii aad ku hadasho Soomaali, waaxda luqadaha, qaybta kaalmada adeegyada, mendoza pillai hayvipinn donavan baker . So [facility] [phone].    ATENCIÓN: Si habla español, tiene a liu disposición servicios gratuitos de asistencia lingüística. Ll[facility] [phone].    We comply with applicable federal civil rights laws and [place] laws. We do not discriminate on the basis of race, color, national origin, age, disability, sex, sexual orientation, or gender identity.            Thank you!     Thank you for choosing [facility]  for your care. Our goal is always to provide you with excellent care. Hearing back from our patients is one way we can continue to improve our services. Please take a few minutes to complete the written survey that you may receive in the mail after your visit with us. Thank you!             Your Updated Medication List - Protect others around you: Learn how to safely use, store and throw away your  medicines at www.disposemymeds.org.          This list is accurate as of 3/23/18 10:10 AM.  Always use your most recent med list.                   Brand Name Dispense Instructions for use Diagnosis    budesonide-formoterol 160-4.5 MCG/ACT Inhaler    SYMBICORT    1 Inhaler    Inhale 2 puffs into the lungs 2 times daily    Bronchospasm       CALCIUM PO      none given        FISH OIL PO      none given        Flax Oil      None Entered        gabapentin 100 MG capsule    NEURONTIN    270 capsule    Week 1: 100 mg in the evening. Week 2: 100 mg times a day. Week 3 and afterwards : 100 mg three times a day PATIENT SAYS SHE WILL TAKE MEDICATION WHEN NEEDED BECAUSE SHE IS LEARY ABOUT THIS MEDICATION.    Analgesic rebound headache, Chronic tension-type headache, not intractable       GLUCOSAMINE HCL PO      None Entered        levothyroxine 25 MCG tablet    SYNTHROID/LEVOTHROID    90 tablet    Take 1 tablet (25 mcg) by mouth daily    Subclinical hypothyroidism       loratadine 10 MG tablet    CLARITIN    30 tablet    Take 1 tablet (10 mg) by mouth daily    Seasonal allergic rhinitis       metoprolol tartrate 25 MG tablet    LOPRESSOR     Take 25 mg by mouth 2 times daily        nitroGLYcerin 0.4 MG sublingual tablet    NITROSTAT    25 tablet    Place 1 tablet (0.4 mg) under the tongue every 5 minutes as needed for chest pain If you are still having symptoms after 3 doses (15 minutes) call 911.    Atypical chest pain       VITAMIN D PO      None Entered        WOMENS 50+ MULTI VITAMIN/MIN PO      DAILY        XARELTO 20 MG Tabs tablet   Generic drug:  rivaroxaban ANTICOAGULANT      Take 1 tablet (20 mg) by mouth daily (with dinner)    Chronic atrial fibrillation (H)

## 2018-03-23 NOTE — NURSING NOTE
"Chief Complaint   Patient presents with     Pain       Initial /76  Pulse 128 Estimated body mass index is 32.04 kg/(m^2) as calculated from the following:    Height as of 2/26/18: 1.588 m (5' 2.5\").    Weight as of 2/26/18: 80.7 kg (178 lb).  Medication Reconciliation: complete     Pre-procedure Intake    Have you been fasting? NA    If yes, for how long? No     Are you taking a prescribed blood thinner such as coumadin, Plavix, Xarelto?  Yes, Xarelto     If yes, when did you take your last dose? Yesterday 8 am or 9 am.     Do you take aspirin?  No    If cervical procedure, have you held aspirin for 6 days?   NA    Do you have any allergies to contrast dye, iodine, steroid and/or numbing medications?  NO    Are you currently taking antibiotics or have an active infection?  NO    Have you had a fever/elevated temperature within the past week? NO    Are you currently taking oral steroids? NO    Do you have a ? Yes       Are you pregnant or breastfeeding?  NO    Are the vital signs normal?  Yes    Ayo Ramirez MA                "

## 2018-03-23 NOTE — PATIENT INSTRUCTIONS
Restart your xarelto 24 hours after procedure-  3/24/18 after 1015am    [facility]   Procedure Discharge Instructions    Today you saw: Dr. Madhav Wiley    You had an:  lumbar facet joint injection     Medications used:  Lidocaine   Bupivacaine   Dexamethasone   Omnipaque            Be cautious when walking. Numbness and/or weakness in the lower extremities may occur for up to 6-8 hours after the procedure due to effect of the local anesthetic    Do not drive for 6 hours. The effect of the local anesthetic could slow your reflexes.     You may resume your regular activities after 24 hours    Avoid strenuous activity for the first 24 hours    You may shower, however avoid swimming, tub baths or hot tubs for 24 hours following your procedure    You may have a mild to moderate increase in pain for several days following the injection.    It may take up to 14 days for the steroid medication to start working although you may feel the effect as early as a few days after the procedure.       You may use ice packs for 10-15 minutes, 3 to 4 times a day at the injection site for comfort    Do not use heat to painful areas for 6 to 8 hours. This will give the local anesthetic time to wear off and prevent you from accidentally burning your skin.     You may use anti-inflammatory medications (such as Ibuprofen or Aleve or Advil) or Tylenol for pain control if necessary    If you were fasting, you may resume your normal diet and medications after the procedure    If you have diabetes, check your blood sugar more frequently than usual as your blood sugar may be higher than normal for 10-14 days following a steroid injection. Contact your doctor who manages your diabetes if your blood sugar is higher than usual    If you experience any of the following, call the pain center nursing line during work hours at [phone] or the after hours provider line at [phone]:  -Fever over 100 degree F  -Swelling,  bleeding, redness, drainage, warmth at the injection site  -Progressive weakness or numbness in your legs or arms  -Loss of bowel or bladder function  -Unusual headache that is not relieved by Tylenol or other pain reliever  -Unusual new onset of pain that is not improving      Phone #s:  Appointment line: [phone];  Nurse line: [phone]

## 2018-03-23 NOTE — NURSING NOTE
Discharge Information    IV Discontiued Time:  NA    Amount of Fluid Infused:  NA    Discharge Criteria = When patient returns to baseline or as per MD order    Consciousness:  Pt is fully awake    Circulation:  BP +/- 20% of pre-procedure level    Respiration:  Patient is able to breathe deeply    O2 Sat:  Patient is able to maintain O2 Sat >92% on room air    Activity:  Moves 4 extremities on command    Ambulation:  Patient is able to stand and walk or stand and pivot into wheelchair    Dressing:  Clean/dry or No Dressing    Notes:   Discharge instructions and AVS given to patient    Patient meets criteria for discharge?  YES    Admitted to PCU?  No    Responsible adult present to accompany patient home?  Yes    Signature/Title:    jennie burleson RN Care Coordinator  [facility]

## 2018-03-30 ENCOUNTER — TELEPHONE (OUTPATIENT)
Dept: PALLIATIVE MEDICINE | Facility: CLINIC | Age: 83
End: 2018-03-30

## 2018-03-30 NOTE — TELEPHONE ENCOUNTER
Patient had a bilateral L5-S1 facet joint injections  on 3/23/18.  Called patient for an update.      Left message that we were calling for an update about how she was doing after the injection.  LM that if she has any problems or questions to call the clinic at [phone].

## 2018-04-03 NOTE — TELEPHONE ENCOUNTER
Called patient. Advised that it can take 14 days for steroid to take effect. Advised that she should wait until 2 week time frame is completed. If no relief she should contact referring provider.     Annie Dunham  RN-BSN Care Coordinator  [facility]

## 2018-05-10 ENCOUNTER — OFFICE VISIT (OUTPATIENT)
Dept: FAMILY MEDICINE | Facility: CLINIC | Age: 83
End: 2018-05-10
Payer: COMMERCIAL

## 2018-05-10 ENCOUNTER — RADIANT APPOINTMENT (OUTPATIENT)
Dept: GENERAL RADIOLOGY | Facility: CLINIC | Age: 83
End: 2018-05-10
Attending: PHYSICIAN ASSISTANT
Payer: COMMERCIAL

## 2018-05-10 VITALS
TEMPERATURE: 98.2 F | HEART RATE: 83 BPM | DIASTOLIC BLOOD PRESSURE: 83 MMHG | RESPIRATION RATE: 16 BRPM | WEIGHT: 177 LBS | OXYGEN SATURATION: 98 % | SYSTOLIC BLOOD PRESSURE: 139 MMHG | HEIGHT: 63 IN | BODY MASS INDEX: 31.36 KG/M2

## 2018-05-10 DIAGNOSIS — M89.8X8 ILIAC CREST BONE PAIN: ICD-10-CM

## 2018-05-10 DIAGNOSIS — R68.89 COLD INTOLERANCE: ICD-10-CM

## 2018-05-10 DIAGNOSIS — T39.95XA ANALGESIC REBOUND HEADACHE: Primary | ICD-10-CM

## 2018-05-10 DIAGNOSIS — G44.229 CHRONIC TENSION-TYPE HEADACHE, NOT INTRACTABLE: ICD-10-CM

## 2018-05-10 DIAGNOSIS — G44.40 ANALGESIC REBOUND HEADACHE: Primary | ICD-10-CM

## 2018-05-10 LAB
T4 FREE SERPL-MCNC: 0.94 NG/DL (ref 0.76–1.46)
TSH SERPL DL<=0.005 MIU/L-ACNC: 6.54 MU/L (ref 0.4–4)

## 2018-05-10 PROCEDURE — 99214 OFFICE O/P EST MOD 30 MIN: CPT | Performed by: PHYSICIAN ASSISTANT

## 2018-05-10 PROCEDURE — 84439 ASSAY OF FREE THYROXINE: CPT | Performed by: PHYSICIAN ASSISTANT

## 2018-05-10 PROCEDURE — 86376 MICROSOMAL ANTIBODY EACH: CPT | Performed by: PHYSICIAN ASSISTANT

## 2018-05-10 PROCEDURE — 36415 COLL VENOUS BLD VENIPUNCTURE: CPT | Performed by: PHYSICIAN ASSISTANT

## 2018-05-10 PROCEDURE — 72170 X-RAY EXAM OF PELVIS: CPT | Mod: FY

## 2018-05-10 PROCEDURE — 84443 ASSAY THYROID STIM HORMONE: CPT | Performed by: PHYSICIAN ASSISTANT

## 2018-05-10 RX ORDER — GABAPENTIN 100 MG/1
CAPSULE ORAL
Qty: 270 CAPSULE | Refills: 0 | Status: SHIPPED | OUTPATIENT
Start: 2018-05-10 | End: 2019-01-14

## 2018-05-10 NOTE — MR AVS SNAPSHOT
"              After Visit Summary   5/10/2018    Rosemarie Fry    MRN: [id number]           Patient Information     Date Of Birth          1934        Visit Information        Provider Department      5/10/2018 9:00 AM Alden Conklin PA-C [facility]        Today's Diagnoses     Analgesic rebound headache    -  1    Chronic tension-type headache, not intractable        Iliac crest bone pain        Cold intolerance           Follow-ups after your visit        Who to contact     Normal or non-critical lab and imaging results will be communicated to you by Wrightspeedhart, letter or phone within 4 business days after the clinic has received the results. If you do not hear from us within 7 days, please contact the clinic through Wrightspeedhart or phone. If you have a critical or abnormal lab result, we will notify you by phone as soon as possible.  Submit refill requests through [employer] or call your pharmacy and they will forward the refill request to us. Please allow 3 business days for your refill to be completed.          If you need to speak with a  for additional information , please call: [phone]             Additional Information About Your Visit        [employer] Information     [employer] lets you send messages to your doctor, view your test results, renew your prescriptions, schedule appointments and more. To sign up, go to [email or web address] . Click on \"Log in\" on the left side of the screen, which will take you to the Welcome page. Then click on \"Sign up Now\" on the right side of the page.     You will be asked to enter the access code listed below, as well as some personal information. Please follow the directions to create your username and password.     Your access code is: [email or web address]  Expires: 2018 11:44 AM     Your access code will  in 90 days. If you need help or a new code, please call your [place] clinic or [phone].        Care EveryWhere ID     This is " "your Care EveryWhere ID. This could be used by other organizations to access your [place] medical records  [id number]        Your Vitals Were     Pulse Temperature Respirations Height Pulse Oximetry BMI (Body Mass Index)    83 98.2  F (36.8  C) (Tympanic) 16 5' 2.5\" (1.588 m) 98% 31.86 kg/m2       Blood Pressure from Last 3 Encounters:   05/10/18 139/83   03/23/18 (!) 114/96   02/26/18 138/76    Weight from Last 3 Encounters:   05/10/18 177 lb (80.3 kg)   02/26/18 178 lb (80.7 kg)   01/27/17 168 lb (76.2 kg)              We Performed the Following     Thyroid peroxidase antibody     TSH with free T4 reflex          Today's Medication Changes          These changes are accurate as of 5/10/18 10:27 AM.  If you have any questions, ask your nurse or doctor.               Stop taking these medicines if you haven't already. Please contact your care team if you have questions.     FISH OIL PO   Stopped by:  Alden Conklin PA-C           Flax Oil   Stopped by:  Alden Conklin PA-C           levothyroxine 25 MCG tablet   Commonly known as:  SYNTHROID/LEVOTHROID   Stopped by:  Alden Conklin PA-C           loratadine 10 MG tablet   Commonly known as:  CLARITIN   Stopped by:  Alden Conklin PA-C           VITAMIN D PO   Stopped by:  Alden Conklin PA-C                Where to get your medicines      Some of these will need a paper prescription and others can be bought over the counter.  Ask your nurse if you have questions.     Bring a paper prescription for each of these medications     gabapentin 100 MG capsule                Primary Care Provider Office Phone # Fax #    [facility] [phone] [address] [facility] [address]        Equal Access to Services     [facility] BENJAMÍN : Eliseo plascencia Soollie, waaxda luqadaha, qaybta kaalmada jared, mendoza tomas. So [facility] [phone].    ATENCIÓN: Si clifford rodriguez " disposición servicios gratuitos de asistencia lingüística. Nas painter [phone].    We comply with applicable federal civil rights laws and [place] laws. We do not discriminate on the basis of race, color, national origin, age, disability, sex, sexual orientation, or gender identity.            Thank you!     Thank you for choosing [facility]  for your care. Our goal is always to provide you with excellent care. Hearing back from our patients is one way we can continue to improve our services. Please take a few minutes to complete the written survey that you may receive in the mail after your visit with us. Thank you!             Your Updated Medication List - Protect others around you: Learn how to safely use, store and throw away your medicines at www.disposemymeds.org.          This list is accurate as of 5/10/18 10:27 AM.  Always use your most recent med list.                   Brand Name Dispense Instructions for use Diagnosis    budesonide-formoterol 160-4.5 MCG/ACT Inhaler    SYMBICORT    1 Inhaler    Inhale 2 puffs into the lungs 2 times daily    Bronchospasm       CALCIUM PO      none given        gabapentin 100 MG capsule    NEURONTIN    270 capsule    Week 1: 100 mg in the evening. Week 2: 100 mg times a day. Week 3 and afterwards : 100 mg three times a day PATIENT SAYS SHE WILL TAKE MEDICATION WHEN NEEDED BECAUSE SHE IS LEARY ABOUT THIS MEDICATION.    Analgesic rebound headache, Chronic tension-type headache, not intractable       GLUCOSAMINE HCL PO      None Entered        metoprolol tartrate 25 MG tablet    LOPRESSOR     Take 25 mg by mouth 2 times daily        nitroGLYcerin 0.4 MG sublingual tablet    NITROSTAT    25 tablet    Place 1 tablet (0.4 mg) under the tongue every 5 minutes as needed for chest pain If you are still having symptoms after 3 doses (15 minutes) call 911.    Atypical chest pain       WOMENS 50+ MULTI VITAMIN/MIN PO      DAILY        XARELTO 20 MG Tabs tablet    Generic drug:  rivaroxaban ANTICOAGULANT      Take 1 tablet (20 mg) by mouth daily (with dinner)    Chronic atrial fibrillation (H)

## 2018-05-10 NOTE — PROGRESS NOTES
SUBJECTIVE:   Rosemarie Fry is a 83 year old female who presents to clinic today for the following health issues:      Joint Pain    Onset: 2 months    Description:   Location: right ribs  Character: Sharp and Dull ache    Intensity: mild, moderate    Progression of Symptoms: ongoing    Accompanying Signs & Symptoms:  Other symptoms: none    History:   Previous similar pain: YES      Precipitating factors:   Trauma or overuse: felt pain during exercise    Alleviating factors:  Improved by: nothing    Therapies Tried and outcome: none      Doing some twisting exercise and felt discomfort in her right side and right low back. . This occurred several mos ago. Pain with bending. No profound sob. No pain with breathing.       Problem list and histories reviewed & adjusted, as indicated.  Additional history: as documented    BP Readings from Last 3 Encounters:   05/10/18 139/83   03/23/18 (!) 114/96   02/26/18 138/76    Wt Readings from Last 3 Encounters:   05/10/18 177 lb (80.3 kg)   02/26/18 178 lb (80.7 kg)   01/27/17 168 lb (76.2 kg)                    Reviewed and updated as needed this visit by clinical staff  Tobacco  Allergies  Meds       Reviewed and updated as needed this visit by Provider         All other systems negative except as outline above  OBJECTIVE:  CHEST:chest clear to IPPA, no tachypnea, retractions or cyanosis and S1, S2 normal, no murmur, no gallop, rate regular.  Tenderness with palpation of her right iliac crest.   BACK: Lumbosacral spine area reveals local tenderness.  Painful and reduced LS ROM noted. Straight leg raise is negative DTR's, motor strength and sensation normal, including heel and toe gait.  Perifpheral pulses are palpable.  Hipes and knees have full range of motion without pain.  No abdominal tenderness, mass or organomegaly.    Rosemarie was seen today for chest pain.    Diagnoses and all orders for this visit:    Analgesic rebound headache  -     gabapentin (NEURONTIN) 100  MG capsule; Week 1: 100 mg in the evening. Week 2: 100 mg times a day. Week 3 and afterwards : 100 mg three times a day  PATIENT SAYS SHE WILL TAKE MEDICATION WHEN NEEDED BECAUSE SHE IS LEARY ABOUT THIS MEDICATION.    Chronic tension-type headache, not intractable  -     gabapentin (NEURONTIN) 100 MG capsule; Week 1: 100 mg in the evening. Week 2: 100 mg times a day. Week 3 and afterwards : 100 mg three times a day  PATIENT SAYS SHE WILL TAKE MEDICATION WHEN NEEDED BECAUSE SHE IS LEARY ABOUT THIS MEDICATION.    Iliac crest bone pain  -     XR Pelvis 1/2 Views; Future    Cold intolerance  -     TSH with free T4 reflex  -     Thyroid peroxidase antibody    Other orders  -     Cancel: XR Ribs & Chest Right G/E 3 Views; Future      Advised supportive and symptomatic treatment.  Follow up with Provider - if condition persists or worsens.

## 2018-05-11 LAB — THYROPEROXIDASE AB SERPL-ACNC: 64 IU/ML

## 2018-05-21 ENCOUNTER — RADIANT APPOINTMENT (OUTPATIENT)
Dept: GENERAL RADIOLOGY | Facility: CLINIC | Age: 83
End: 2018-05-21
Attending: PHYSICIAN ASSISTANT
Payer: COMMERCIAL

## 2018-05-21 ENCOUNTER — OFFICE VISIT (OUTPATIENT)
Dept: FAMILY MEDICINE | Facility: CLINIC | Age: 83
End: 2018-05-21
Payer: COMMERCIAL

## 2018-05-21 ENCOUNTER — TELEPHONE (OUTPATIENT)
Dept: FAMILY MEDICINE | Facility: CLINIC | Age: 83
End: 2018-05-21

## 2018-05-21 VITALS
SYSTOLIC BLOOD PRESSURE: 136 MMHG | RESPIRATION RATE: 18 BRPM | OXYGEN SATURATION: 98 % | BODY MASS INDEX: 31.36 KG/M2 | TEMPERATURE: 98.1 F | HEART RATE: 70 BPM | WEIGHT: 177 LBS | DIASTOLIC BLOOD PRESSURE: 85 MMHG | HEIGHT: 63 IN

## 2018-05-21 DIAGNOSIS — R53.83 FATIGUE, UNSPECIFIED TYPE: ICD-10-CM

## 2018-05-21 DIAGNOSIS — J41.8 MIXED SIMPLE AND MUCOPURULENT CHRONIC BRONCHITIS (H): ICD-10-CM

## 2018-05-21 DIAGNOSIS — R79.89 ELEVATED TSH: ICD-10-CM

## 2018-05-21 DIAGNOSIS — R06.09 DYSPNEA ON EXERTION: Primary | ICD-10-CM

## 2018-05-21 DIAGNOSIS — K04.7 TOOTH ABSCESS: ICD-10-CM

## 2018-05-21 LAB
ANION GAP SERPL CALCULATED.3IONS-SCNC: 5 MMOL/L (ref 3–14)
BASOPHILS # BLD AUTO: 0 10E9/L (ref 0–0.2)
BASOPHILS NFR BLD AUTO: 0.3 %
BUN SERPL-MCNC: 30 MG/DL (ref 7–30)
CALCIUM SERPL-MCNC: 9.4 MG/DL (ref 8.5–10.1)
CHLORIDE SERPL-SCNC: 109 MMOL/L (ref 94–109)
CO2 SERPL-SCNC: 26 MMOL/L (ref 20–32)
CREAT SERPL-MCNC: 0.86 MG/DL (ref 0.52–1.04)
DIFFERENTIAL METHOD BLD: ABNORMAL
EOSINOPHIL # BLD AUTO: 0.2 10E9/L (ref 0–0.7)
EOSINOPHIL NFR BLD AUTO: 2.7 %
ERYTHROCYTE [DISTWIDTH] IN BLOOD BY AUTOMATED COUNT: 13.7 % (ref 10–15)
FEF 25/75: NORMAL
FEV-1: NORMAL
FEV1/FVC: NORMAL
FVC: NORMAL
GFR SERPL CREATININE-BSD FRML MDRD: 63 ML/MIN/1.7M2
GLUCOSE SERPL-MCNC: 110 MG/DL (ref 70–99)
HCT VFR BLD AUTO: 38.7 % (ref 35–47)
HGB BLD-MCNC: 12.9 G/DL (ref 11.7–15.7)
LYMPHOCYTES # BLD AUTO: 2 10E9/L (ref 0.8–5.3)
LYMPHOCYTES NFR BLD AUTO: 34.5 %
MCH RBC QN AUTO: 33.7 PG (ref 26.5–33)
MCHC RBC AUTO-ENTMCNC: 33.3 G/DL (ref 31.5–36.5)
MCV RBC AUTO: 101 FL (ref 78–100)
MONOCYTES # BLD AUTO: 0.7 10E9/L (ref 0–1.3)
MONOCYTES NFR BLD AUTO: 11.3 %
NEUTROPHILS # BLD AUTO: 3 10E9/L (ref 1.6–8.3)
NEUTROPHILS NFR BLD AUTO: 51.2 %
NT-PROBNP SERPL-MCNC: 105 PG/ML (ref 0–450)
PLATELET # BLD AUTO: 242 10E9/L (ref 150–450)
POTASSIUM SERPL-SCNC: 4.4 MMOL/L (ref 3.4–5.3)
RBC # BLD AUTO: 3.83 10E12/L (ref 3.8–5.2)
SODIUM SERPL-SCNC: 140 MMOL/L (ref 133–144)
T3 SERPL-MCNC: 87 NG/DL (ref 60–181)
WBC # BLD AUTO: 5.9 10E9/L (ref 4–11)

## 2018-05-21 PROCEDURE — 93000 ELECTROCARDIOGRAM COMPLETE: CPT | Performed by: PHYSICIAN ASSISTANT

## 2018-05-21 PROCEDURE — 94010 BREATHING CAPACITY TEST: CPT | Performed by: PHYSICIAN ASSISTANT

## 2018-05-21 PROCEDURE — 99214 OFFICE O/P EST MOD 30 MIN: CPT | Mod: 25 | Performed by: PHYSICIAN ASSISTANT

## 2018-05-21 PROCEDURE — 36415 COLL VENOUS BLD VENIPUNCTURE: CPT | Performed by: PHYSICIAN ASSISTANT

## 2018-05-21 PROCEDURE — 80048 BASIC METABOLIC PNL TOTAL CA: CPT | Performed by: PHYSICIAN ASSISTANT

## 2018-05-21 PROCEDURE — 71046 X-RAY EXAM CHEST 2 VIEWS: CPT | Mod: FY

## 2018-05-21 PROCEDURE — 85025 COMPLETE CBC W/AUTO DIFF WBC: CPT | Performed by: PHYSICIAN ASSISTANT

## 2018-05-21 PROCEDURE — 84480 ASSAY TRIIODOTHYRONINE (T3): CPT | Performed by: PHYSICIAN ASSISTANT

## 2018-05-21 PROCEDURE — 83880 ASSAY OF NATRIURETIC PEPTIDE: CPT | Performed by: PHYSICIAN ASSISTANT

## 2018-05-21 RX ORDER — BUDESONIDE AND FORMOTEROL FUMARATE DIHYDRATE 80; 4.5 UG/1; UG/1
2 AEROSOL RESPIRATORY (INHALATION) 2 TIMES DAILY
Qty: 1 INHALER | Refills: 1 | Status: SHIPPED | OUTPATIENT
Start: 2018-05-21 | End: 2018-11-26

## 2018-05-21 RX ORDER — ALBUTEROL SULFATE 90 UG/1
2 AEROSOL, METERED RESPIRATORY (INHALATION) EVERY 6 HOURS PRN
Qty: 1 INHALER | Refills: 1 | Status: SHIPPED | OUTPATIENT
Start: 2018-05-21 | End: 2021-06-03

## 2018-05-21 RX ORDER — AMOXICILLIN 875 MG
875 TABLET ORAL 2 TIMES DAILY
Qty: 20 TABLET | Refills: 0 | Status: SHIPPED | OUTPATIENT
Start: 2018-05-21 | End: 2018-08-16

## 2018-05-21 NOTE — PROGRESS NOTES
SUBJECTIVE:   Rosemarie Fry is a 83 year old female who presents to clinic today for the following health issues:      COPD Follow-Up-patient has been using daughter's inhaler as needed. Does not use any maintenance inhaler. Noted shortness of breath with any walking/exercise.    Symptoms are currently:  worsened    Current fatigue or dyspnea with ambulation: worsened from baseline    Shortness of breath: slightly worsened    Increased or change in Cough/Sputum: Yes-  Non productive cough has increased quite a bit per patient    Fever(s): No    Able to walk up 1 flights of stairs without stopping to rest.    Any ER/UC or hospital admissions since your last visit? No     History   Smoking Status     Former Smoker     Packs/day: 1.00     Years: 10.00     Types: Cigarettes     Quit date: 4/12/2007   Smokeless Tobacco     Never Used     Lab Results   Component Value Date    FEV1 101 01/16/2013    HPK6ACJ 106 01/16/2013         Amount of exercise or physical activity: 3-4 days weekly    Problems taking medications regularly: Yes,  Feels as though does not need inhalers    Medication side effects: not applicable    Diet: regular (no restrictions)    No chest pain/palps.   Weight stable.  Cough noted.   Right upper tooth pain. Suspect early abscess.     Problem list and histories reviewed & adjusted, as indicated.  Additional history: as documented    BP Readings from Last 3 Encounters:   05/21/18 136/85   05/10/18 139/83   03/23/18 (!) 114/96    Wt Readings from Last 3 Encounters:   05/21/18 177 lb (80.3 kg)   05/10/18 177 lb (80.3 kg)   02/26/18 178 lb (80.7 kg)                    Reviewed and updated as needed this visit by clinical staff  Tobacco  Allergies  Meds  Problems  Med Hx  Surg Hx  Fam Hx  Soc Hx        Reviewed and updated as needed this visit by Provider  Tobacco  Allergies  Meds  Problems  Med Hx  Surg Hx  Fam Hx  Soc Hx          All other systems negative except as outline  above  OBJECTIVE:  Eye exam - right eye normal lid, conjunctiva, cornea, pupil and fundus, left eye normal lid, conjunctiva, cornea, pupil and fundus.  ENT exam reveals - ENT exam normal, no neck nodes or sinus tenderness.  CHEST:chest clear to IPPA, no tachypnea, retractions or cyanosis and S1, S2 normal, no murmur, no gallop, rate regular.  Minimal edema. 2 pillow orthopnea , no pnd.    Rosemarie was seen today for copd.    Diagnoses and all orders for this visit:    Dyspnea on exertion  -     BNP-N terminal pro  -     CBC with platelets differential  -     EKG 12-lead complete w/read - Clinics  -     XR Chest 2 Views  -     Echocardiogram Complete; Future  -     NM Lexiscan stress test; Future    Fatigue, unspecified type  -     Basic metabolic panel  (Ca, Cl, CO2, Creat, Gluc, K, Na, BUN)    Elevated TSH  -     T3, total    Mixed simple and mucopurulent chronic bronchitis (H)  -     COPD ACTION PLAN  -     Spirometry, Breathing Capacity: Normal Order, Clinic Performed  -     albuterol (PROAIR HFA/PROVENTIL HFA/VENTOLIN HFA) 108 (90 Base) MCG/ACT Inhaler; Inhale 2 puffs into the lungs every 6 hours as needed for shortness of breath / dyspnea or wheezing  -     budesonide-formoterol (SYMBICORT) 80-4.5 MCG/ACT Inhaler; Inhale 2 puffs into the lungs 2 times daily    Tooth abscess  -     amoxicillin (AMOXIL) 875 MG tablet; Take 1 tablet (875 mg) by mouth 2 times daily      work on lifestyle modification

## 2018-05-21 NOTE — TELEPHONE ENCOUNTER
Provental hfa not covered by patient plan. The preferred alternative is VENTOLIN HFA AER. Please call/fax the pharmacy to change medication along with strength, directions, quantity, and refills. Thanks!

## 2018-05-21 NOTE — LETTER
My COPD Action Plan     Name: Rosemarie Fry    YOB: 1934   Date: 5/21/2018    My doctor: Alden Conklin PA-C   My clinic: [facility]    [address]  [place] [id number] [phone]  My Controller Medicine: none   Dose: n/a     My Rescue Medicine: { :481301}   Dose: ***     My Flare Up Medicine: { :409010}   Dose: *** FEV-1 (no units)   Date Value   01/16/2013 101     FEV1/FVC (no units)   Date Value   01/16/2013 106      My COPD Severity: { :773962}      Use of Oxygen: { :121154}     Make sure you've had your pneumonia   vaccines.          GREEN ZONE       Doing well today      Usual level of activity and exercise    Usual amount of cough and mucus    No shortness of breath    Usual level of health (thinking clearly, sleeping well, feel like eating) Actions:      Take daily medicines    Use oxygen as prescribed    Follow regular exercise and diet plan    Avoid cigarette smoke and other irritants that harm the lungs           YELLOW ZONE          Having a bad day or flare up      Short of breath more than usual    A lot more sputum (mucus) than usual    Sputum looks yellow, green, tan, brown or bloody    More coughing or wheezing    Fever or chills    Less energy; trouble completing activities    Trouble thinking or focusing    Using quick relief inhaler or nebulizer more often    Poor sleep; symptoms wake me up    Do not feel like eating Actions:      Get plenty of rest    Take daily medicines    Use quick relief inhaler every *** hours    If you use oxygen, call you doctor to see if you should adjust your oxygen    Do breathing exercises or other things to help you relax    Let a loved one, friend or neighbor know you are feeling worse    Call your care team if you have 2 or more symptoms.  Start taking steroids or antibiotics if directed by your care team           RED ZONE       Need medical care now      Severe shortness of breath (feel you can't  breathe)    Fever, chills    Not enough breath to do any activity    Trouble coughing up mucus, walking or talking    Blood in mucus    Frequent coughing   Rescue medicines are not working    Not able to sleep because of breathing    Feel confused or drowsy    Chest pain    Actions:      Call your health care team.  If you cannot reach your care team, call 911 or go to the emergency room.        Annual Reminders:  Meet with Care Team, Flu Shot every Fall  Pharmacy:    [facility] - [address] [facility] [place] [address] [place] [facility] [place] - [address]  [facility] [id number] [place] [address] - [address] [facility]

## 2018-05-21 NOTE — LETTER
June 7, 2018      Rosemarie Fry  [address] [address]        Dear ,    We are writing to inform you of your test results.    Your lab work from last week came back looking good/normal. Your heart failure test called a bnp was normal. Also, your blood count and kidney function came back looking normal as well.     Resulted Orders   BNP-N terminal pro   Result Value Ref Range    N-Terminal Pro Bnp 105 0 - 450 pg/mL      Comment:         Reference range shown and results flagged as abnormal are for the outpatient,   non acute settings. Establishing a baseline value for each individual patient   is useful for follow-up.  Suggested inpatient cut points for confirming diagnosis of CHF in an acute   setting are:   >450 pg/mL (age 18 to less than 50)   >900 pg/mL (age 50 to less than 75)   >1800 pg/mL (75 yrs and older)  An inpatient or emergency department NT-proPBNP <300 pg/mL effectively rules   out acute CHF, with 99% negative predictive value.      CBC with platelets differential   Result Value Ref Range    WBC 5.9 4.0 - 11.0 10e9/L    RBC Count 3.83 3.8 - 5.2 10e12/L    Hemoglobin 12.9 11.7 - 15.7 g/dL    Hematocrit 38.7 35.0 - 47.0 %     (H) 78 - 100 fl    MCH 33.7 (H) 26.5 - 33.0 pg    MCHC 33.3 31.5 - 36.5 g/dL    RDW 13.7 10.0 - 15.0 %    Platelet Count 242 150 - 450 10e9/L    Diff Method Automated Method     % Neutrophils 51.2 %    % Lymphocytes 34.5 %    % Monocytes 11.3 %    % Eosinophils 2.7 %    % Basophils 0.3 %    Absolute Neutrophil 3.0 1.6 - 8.3 10e9/L    Absolute Lymphocytes 2.0 0.8 - 5.3 10e9/L    Absolute Monocytes 0.7 0.0 - 1.3 10e9/L    Absolute Eosinophils 0.2 0.0 - 0.7 10e9/L    Absolute Basophils 0.0 0.0 - 0.2 10e9/L   T3, total   Result Value Ref Range    Triiodothyronine (T3) 87 60 - 181 ng/dL   Basic metabolic panel  (Ca, Cl, CO2, Creat, Gluc, K, Na, BUN)   Result Value Ref Range    Sodium 140 133 - 144 mmol/L    Potassium 4.4 3.4 - 5.3 mmol/L     Chloride 109 94 - 109 mmol/L    Carbon Dioxide 26 20 - 32 mmol/L    Anion Gap 5 3 - 14 mmol/L    Glucose 110 (H) 70 - 99 mg/dL      Comment:      Non Fasting    Urea Nitrogen 30 7 - 30 mg/dL    Creatinine 0.86 0.52 - 1.04 mg/dL    GFR Estimate 63 >60 mL/min/1.7m2      Comment:      Non  GFR Calc    GFR Estimate If Black 76 >60 mL/min/1.7m2      Comment:       GFR Calc    Calcium 9.4 8.5 - 10.1 mg/dL       If you have any questions or concerns, please call the clinic at the number listed above.       Sincerely,        Alden Conklin PA-C/cristiano

## 2018-05-21 NOTE — MR AVS SNAPSHOT
After Visit Summary   5/21/2018    Rosemarie Fry    MRN: [id number]           Patient Information     Date Of Birth          11/7/1934        Visit Information        Provider Department      5/21/2018 2:00 PM Alden Conklin PA-C [facility]        Today's Diagnoses     Dyspnea on exertion    -  1    Fatigue, unspecified type        Elevated TSH        Mixed simple and mucopurulent chronic bronchitis (H)        Tooth abscess           Follow-ups after your visit        Your next 10 appointments already scheduled     May 30, 2018 10:45 AM CDT   NM INJECTION with MGNMINJ1   [facility])    [id number] [place] [address] [phone]            May 30, 2018 11:30 AM CDT   NM SCAN3 with MGNM1   [facility])    [address] [place] [address] [phone]            May 30, 2018 12:00 PM CDT   Ekg Stress Nm Lexiscan with MG STRESS RM, MG IMAGING NURSE, MG PC CARD, MG CV TECH   [facility])    [address] [place] [address] [phone]            May 30, 2018 12:30 PM CDT   NM MPI WITH LEXISCAN with MGNM1   [facility])    [address] [place] [id number] [phone]           For a ONE day exam: Allow 3-4 hours for test. For a TWO day exam: Allow  minutes PER day for test.  On the day of your resting scan: Please stop eating 3 hours before the test. You may drink water or juice.  On the day of your stress test: Stop all caffeine 12 hours before the test. This includes coffee, tea, soda pop, chocolate and certain medicines (such as Anacin, Excedrin and NoDoz). Also avoid decaf coffee and tea, as these contain small amounts of caffeine.  Stop eating 3 hours before the test. You may drink water.  You may need to stop some medicines  before the test. Follow your doctor s orders. - If you take a beta blocker: Do not take your beta-blocker on the day before your test, unless specifically told to by your doctor. And do not take it on the day of your test. Bring it with you to take after the test. - If you take Aggrenox or dipyridamole (Persantine, Permole), stop taking it 48 hours before your test. - If you take Viagra, Cialis or Levitra, stop taking it 48 hours before your test. - If you take theophylline or aminophylline, stop taking it 12 hours before your test.  Do not take nitrates on the day of your test. Do not wear your Nitro-Patch.  Please wear a loose two-piece outfit. If you will have an exercise test, bring rubber-soled walking shoes.  When you arrive, please tell us if you have a pacemaker or ICD (implantable defibrillator).  Please call your Imaging Department at your exam site with any questions.            May 30, 2018  1:30 PM CDT   Ech Complete with MGECHNIKOLAS, MG ECHO TECH   [facility]    [address] [place] [address] [phone]           1. Please bring or wear a comfortable two-piece outfit. 2. You may eat, drink and take your normal medicines. 3. For any questions that cannot be answered, please contact the ordering physician              Future tests that were ordered for you today     Open Future Orders        Priority Expected Expires Ordered    Echocardiogram Complete Routine  5/21/2019 5/21/2018    NM Lexiscan stress test Routine  5/21/2019 5/21/2018            Who to contact     Normal or non-critical lab and imaging results will be communicated to you by MyChart, letter or phone within 4 business days after the clinic has received the results. If you do not hear from us within 7 days, please contact the clinic through MyChart or phone. If you have a critical or abnormal lab result, we will notify you by phone as soon as possible.  Submit refill requests  "through [employer] or call your pharmacy and they will forward the refill request to us. Please allow 3 business days for your refill to be completed.          If you need to speak with a  for additional information , please call: [phone]             Additional Information About Your Visit        [employer] Information     [employer] lets you send messages to your doctor, view your test results, renew your prescriptions, schedule appointments and more. To sign up, go to [email or web address] . Click on \"Log in\" on the left side of the screen, which will take you to the Welcome page. Then click on \"Sign up Now\" on the right side of the page.     You will be asked to enter the access code listed below, as well as some personal information. Please follow the directions to create your username and password.     Your access code is: [email or web address]  Expires: 2018 11:44 AM     Your access code will  in 90 days. If you need help or a new code, please call your [place] clinic or [phone].        Care EveryWhere ID     This is your Care EveryWhere ID. This could be used by other organizations to access your [place] medical records  [id number]        Your Vitals Were     Pulse Temperature Respirations Height Pulse Oximetry BMI (Body Mass Index)    70 98.1  F (36.7  C) (Tympanic) 18 5' 2.5\" (1.588 m) 98% 31.86 kg/m2       Blood Pressure from Last 3 Encounters:   18 136/85   05/10/18 139/83   18 (!) 114/96    Weight from Last 3 Encounters:   18 177 lb (80.3 kg)   05/10/18 177 lb (80.3 kg)   18 178 lb (80.7 kg)              We Performed the Following     Basic metabolic panel  (Ca, Cl, CO2, Creat, Gluc, K, Na, BUN)     BNP-N terminal pro     CBC with platelets differential     COPD ACTION PLAN     EKG 12-lead complete w/read - Clinics     Spirometry, Breathing Capacity: Normal Order, Clinic Performed     T3, total     XR Chest 2 Views          Today's Medication Changes    "       These changes are accurate as of 5/21/18  3:49 PM.  If you have any questions, ask your nurse or doctor.               Start taking these medicines.        Dose/Directions    albuterol 108 (90 Base) MCG/ACT Inhaler   Commonly known as:  PROAIR HFA/PROVENTIL HFA/VENTOLIN HFA   Used for:  Mixed simple and mucopurulent chronic bronchitis (H)   Started by:  Alden Conklin PA-C        Dose:  2 puff   Inhale 2 puffs into the lungs every 6 hours as needed for shortness of breath / dyspnea or wheezing   Quantity:  1 Inhaler   Refills:  1       amoxicillin 875 MG tablet   Commonly known as:  AMOXIL   Used for:  Tooth abscess   Started by:  Alden Conklin PA-C        Dose:  875 mg   Take 1 tablet (875 mg) by mouth 2 times daily   Quantity:  20 tablet   Refills:  0       budesonide-formoterol 80-4.5 MCG/ACT Inhaler   Commonly known as:  SYMBICORT   Used for:  Mixed simple and mucopurulent chronic bronchitis (H)   Replaces:  budesonide-formoterol 160-4.5 MCG/ACT Inhaler   Started by:  Alden Conklin PA-C        Dose:  2 puff   Inhale 2 puffs into the lungs 2 times daily   Quantity:  1 Inhaler   Refills:  1         Stop taking these medicines if you haven't already. Please contact your care team if you have questions.     budesonide-formoterol 160-4.5 MCG/ACT Inhaler   Commonly known as:  SYMBICORT   Replaced by:  budesonide-formoterol 80-4.5 MCG/ACT Inhaler   Stopped by:  Alden Conklin PA-C                Where to get your medicines      These medications were sent to [facility] - [place], [place] - [address]  AT [facility] [address]  [address] [facility] [place] [address]    Hours:  24-hours Phone:  [phone]     albuterol 108 (90 Base) MCG/ACT Inhaler    amoxicillin 875 MG tablet    budesonide-formoterol 80-4.5 MCG/ACT Inhaler                Primary Care Provider Office Phone # Fax #    [facility] [phone] [address] [facility]  [place] [facility] [address]        Equal Access to Services     ANDRIA BUTTS : Hadii ravi moore hadtiffany Soollie, waaxda luqadaha, qaybta kaeddidayana gobienvenidodayana, mendoza birchemiliezara tomas. So [facility] [phone].    ATENCIÓN: Si habla español, tiene a liu disposición servicios gratuitos de asistencia lingüística. [facility] [phone].    We comply with applicable federal civil rights laws and [place] laws. We do not discriminate on the basis of race, color, national origin, age, disability, sex, sexual orientation, or gender identity.            Thank you!     Thank you for choosing [facility]  for your care. Our goal is always to provide you with excellent care. Hearing back from our patients is one way we can continue to improve our services. Please take a few minutes to complete the written survey that you may receive in the mail after your visit with us. Thank you!             Your Updated Medication List - Protect others around you: Learn how to safely use, store and throw away your medicines at www.disposemymeds.org.          This list is accurate as of 5/21/18  3:49 PM.  Always use your most recent med list.                   Brand Name Dispense Instructions for use Diagnosis    albuterol 108 (90 Base) MCG/ACT Inhaler    PROAIR HFA/PROVENTIL HFA/VENTOLIN HFA    1 Inhaler    Inhale 2 puffs into the lungs every 6 hours as needed for shortness of breath / dyspnea or wheezing    Mixed simple and mucopurulent chronic bronchitis (H)       amoxicillin 875 MG tablet    AMOXIL    20 tablet    Take 1 tablet (875 mg) by mouth 2 times daily    Tooth abscess       budesonide-formoterol 80-4.5 MCG/ACT Inhaler    SYMBICORT    1 Inhaler    Inhale 2 puffs into the lungs 2 times daily    Mixed simple and mucopurulent chronic bronchitis (H)       CALCIUM PO      none given        gabapentin 100 MG capsule    NEURONTIN    270 capsule    Week 1: 100 mg in the evening. Week 2: 100 mg times a day. Week 3 and  afterwards : 100 mg three times a day PATIENT SAYS SHE WILL TAKE MEDICATION WHEN NEEDED BECAUSE SHE IS LEARY ABOUT THIS MEDICATION.    Analgesic rebound headache, Chronic tension-type headache, not intractable       GLUCOSAMINE HCL PO      None Entered        metoprolol tartrate 25 MG tablet    LOPRESSOR     Take 25 mg by mouth 2 times daily        nitroGLYcerin 0.4 MG sublingual tablet    NITROSTAT    25 tablet    Place 1 tablet (0.4 mg) under the tongue every 5 minutes as needed for chest pain If you are still having symptoms after 3 doses (15 minutes) call 911.    Atypical chest pain       WOMENS 50+ MULTI VITAMIN/MIN PO      DAILY        XARELTO 20 MG Tabs tablet   Generic drug:  rivaroxaban ANTICOAGULANT      Take 1 tablet (20 mg) by mouth daily (with dinner)    Chronic atrial fibrillation (H)

## 2018-05-22 NOTE — TELEPHONE ENCOUNTER
Verbal order to fill with ventolin?  unsure what is needed, below both med names on script,   Jory Yadav RN

## 2018-05-23 ENCOUNTER — TELEPHONE (OUTPATIENT)
Dept: FAMILY MEDICINE | Facility: CLINIC | Age: 83
End: 2018-05-23

## 2018-05-30 ENCOUNTER — TELEPHONE (OUTPATIENT)
Dept: GENERAL RADIOLOGY | Facility: CLINIC | Age: 83
End: 2018-05-30

## 2018-05-30 NOTE — TELEPHONE ENCOUNTER
Called pt to review preparation for nuclear stress test. No answer. Left message with contact information for pt to return call.

## 2018-05-31 ENCOUNTER — RADIANT APPOINTMENT (OUTPATIENT)
Dept: NUCLEAR MEDICINE | Facility: CLINIC | Age: 83
End: 2018-05-31
Attending: PHYSICIAN ASSISTANT
Payer: COMMERCIAL

## 2018-05-31 ENCOUNTER — TELEPHONE (OUTPATIENT)
Dept: FAMILY MEDICINE | Facility: CLINIC | Age: 83
End: 2018-05-31

## 2018-05-31 ENCOUNTER — OFFICE VISIT (OUTPATIENT)
Dept: CARDIOLOGY | Facility: CLINIC | Age: 83
End: 2018-05-31
Attending: PHYSICIAN ASSISTANT
Payer: COMMERCIAL

## 2018-05-31 VITALS — DIASTOLIC BLOOD PRESSURE: 95 MMHG | SYSTOLIC BLOOD PRESSURE: 153 MMHG | HEART RATE: 68 BPM

## 2018-05-31 DIAGNOSIS — R06.09 DOE (DYSPNEA ON EXERTION): ICD-10-CM

## 2018-05-31 DIAGNOSIS — R06.09 DOE (DYSPNEA ON EXERTION): Primary | ICD-10-CM

## 2018-05-31 DIAGNOSIS — R06.09 DYSPNEA ON EXERTION: ICD-10-CM

## 2018-05-31 DIAGNOSIS — J41.8 MIXED SIMPLE AND MUCOPURULENT CHRONIC BRONCHITIS (H): Primary | ICD-10-CM

## 2018-05-31 PROCEDURE — A9502 TC99M TETROFOSMIN: HCPCS | Performed by: PHYSICIAN ASSISTANT

## 2018-05-31 PROCEDURE — 93016 CV STRESS TEST SUPVJ ONLY: CPT

## 2018-05-31 PROCEDURE — 93306 TTE W/DOPPLER COMPLETE: CPT

## 2018-05-31 PROCEDURE — 93017 CV STRESS TEST TRACING ONLY: CPT

## 2018-05-31 PROCEDURE — 78452 HT MUSCLE IMAGE SPECT MULT: CPT

## 2018-05-31 RX ORDER — REGADENOSON 0.08 MG/ML
0.4 INJECTION, SOLUTION INTRAVENOUS ONCE
Status: COMPLETED | OUTPATIENT
Start: 2018-05-31 | End: 2018-05-31

## 2018-05-31 RX ADMIN — Medication 3 ML: at 12:00

## 2018-05-31 RX ADMIN — REGADENOSON 0.4 MG: 0.08 INJECTION, SOLUTION INTRAVENOUS at 14:08

## 2018-05-31 NOTE — NURSING NOTE
Patient presents today for resting echo ordered by MD.     Echo Tech provided patient education about resting echo. IV started in LAC.   IV start documented in  [employer].  Echo technician completed resting echo. Patient monitored according to Opt[employer] protocol. Data transferred to [facility] for final interpretation through [employer].     Optison medication:  Amount used 3ml Optison mixed with 6ml Saline - [id number].  6ml Wasted.   After completion of resting echo, IV was left in for nuclear stress test.    Patient education provided about cardiology interpretation and primary provider will be notified of results.    Gloria Shelton RDCS

## 2018-05-31 NOTE — TELEPHONE ENCOUNTER
Daughter Yesi calling, Patient is having a nuclear medicine scan and stress echo done today. Daughter is asking what labs came back as to why Alden recommended patient have these tests done. Patient cannot explain to daughter why she is having test done. Please advise. Consent to speak with daughter in chart

## 2018-05-31 NOTE — TELEPHONE ENCOUNTER
Spoke with daughter and mentioned that it looks like the tests were ordered due to shortness of breath on exertion as seen from visit note below.  Dyspnea on exertion  -     BNP-N terminal pro  -     CBC with platelets differential  -     EKG 12-lead complete w/read - Clinics  -     XR Chest 2 Views  -     Echocardiogram Complete; Future  -     NM Lexiscan stress test; Future    She verbalized understanding with that but also had questions regarding her increased TSH and the labs and xray's that were done on that visit. Please review and advise on those. Daughter is wondering if maybe the increase of TSH is causing her shortness of breath. Informed daughter that she may not here back until next week because Alden is out of the office but I would send message to partner providers to review too.

## 2018-05-31 NOTE — MR AVS SNAPSHOT
MRN:[id number]                      After Visit Summary   5/31/2018    Rosemarie Fry    MRN: [id number]           Visit Information        Provider Department      5/31/2018 2:15 PM MG CV TECH; MG CARD; MG IMAGING NURSE; MG STRESS RM [facility]        Your next 10 appointments already scheduled     May 31, 2018  2:15 PM CDT   Ekg Stress Nm Lexiscan with MG STRESS RM, MG IMAGING NURSE, MG CARD, MG CV TECH   [facility] ([facility]    [address] [place] [id number] [phone]            May 31, 2018  2:45 PM CDT   NM MPI WITH LEXISCAN with MGNM1   [facility])    [address] [place] [id number] [phone]           For a ONE day exam: Allow 3-4 hours for test. For a TWO day exam: Allow  minutes PER day for test.  On the day of your resting scan: Please stop eating 3 hours before the test. You may drink water or juice.  On the day of your stress test: Stop all caffeine 12 hours before the test. This includes coffee, tea, soda pop, chocolate and certain medicines (such as Anacin, Excedrin and NoDoz). Also avoid decaf coffee and tea, as these contain small amounts of caffeine.  Stop eating 3 hours before the test. You may drink water.  You may need to stop some medicines before the test. Follow your doctor s orders. - If you take a beta blocker: Do not take your beta-blocker on the day before your test, unless specifically told to by your doctor. And do not take it on the day of your test. Bring it with you to take after the test. - If you take Aggrenox or dipyridamole (Persantine, Permole), stop taking it 48 hours before your test. - If you take Viagra, Cialis or Levitra, stop taking it 48 hours before your test. - If you take theophylline or aminophylline, stop taking it 12 hours before your test.  Do not take nitrates on the day of your test. Do not wear  your Nitro-Patch.  Please wear a loose two-piece outfit. If you will have an exercise test, bring rubber-soled walking shoes.  When you arrive, please tell us if you have a pacemaker or ICD (implantable defibrillator).  Please call your Imaging Department at your exam site with any questions.              [employer] [facility] Information     MoboFree is an electronic gateway that provides easy, online access to your medical records. With MoboFree, you can request a clinic appointment, read your test results, renew a prescription or communicate with your care team.     To sign up for MoboFree visit the website at [email or web address]   You will be asked to enter the access code listed below, as well as some personal information. Please follow the directions to create your username and password.     Your access code is: [email or web address]  Expires: 2018  2:11 PM     Your access code will  in 90 days. If you need help or a new code, please contact your [facility] or call [phone] for assistance.        Care EveryWhere ID     This is your Care EveryWhere ID. This could be used by other organizations to access your [place] medical records  [id number]        Equal Access to Services     BENTON BUTTS : Eliseo Johnson, wajoana espinoza, qaelmer quintanillaaltony coleman, mendoza tomas. So [facility] [phone].    ATENCIÓN: Si habla español, tiene a liu disposición servicios gratuitos de asistencia lingüística. Nas al [phone].    We comply with applicable federal civil rights laws and [place] laws. We do not discriminate on the basis of race, color, national origin, age, disability, sex, sexual orientation, or gender identity.

## 2018-05-31 NOTE — PROGRESS NOTES
IV was started in the left AC with a 22g Jelco catheter and resting images were completed.      Patient's IV site was injected by RN with 0.4mg's of Lexiscan (Regadenoson) over a 15 second period, followed by a 5-mL saline flush.  The Nuclear Tech injected the Myoview tracer through the IV site 20 seconds later.      Patient offered C/O: short of breath      Total dose of Lexiscan was 0.4mg's.   Lexiscan NDC# 5198-1837-82   Dr. Sumner provided supervision of the tests performed today.

## 2018-06-08 NOTE — TELEPHONE ENCOUNTER
Lab result notes are in computer. Looks like a letter was sent. Call patient and/or daughter with results. Also, let them know her stress test revealed no concerning findings and her echocardiogram showed evidence of some mild chronic heart failure.  Find out how she's been doing/feeling?

## 2018-06-08 NOTE — TELEPHONE ENCOUNTER
Daughter Janis called again. Said she still hasnt gotten results from echo and nuclear medicine tests.  Please call to discuss.

## 2018-06-08 NOTE — TELEPHONE ENCOUNTER
Spoke with daughter, went over results per Alden Conklin PA-C.  Read result letter to daughter from 6-7-18 and informed her per Alden below, read word for word.  Daughter stated that she feels her mom is still SOB, she gets winded easily just going up steps.  Per daughter patient has always been very active and just seems like a big change in her activity level.  Daughter stated the echo showing evidence of some mild chronic heart failure is new to her.  Should they be follow up with a cardiologist?  Will send to PCP to advise.

## 2018-06-13 NOTE — TELEPHONE ENCOUNTER
I don't feel that salvador's heart failure is a significant concern at this point. And likely not the concern for her sob. Copd certainly could be. i'd like her to see pulmonology first, for a consult regarding her sob.

## 2018-06-13 NOTE — TELEPHONE ENCOUNTER
Spoke with daughter Yesi informed as below per Alden. Phone number given to daughter for pulmonologist. Daughter verbalized understanding and had no further questions or concerns

## 2018-07-12 ENCOUNTER — TELEPHONE (OUTPATIENT)
Dept: FAMILY MEDICINE | Facility: CLINIC | Age: 83
End: 2018-07-12

## 2018-07-12 DIAGNOSIS — Z86.19 HISTORY OF LYME DISEASE: Primary | ICD-10-CM

## 2018-07-12 NOTE — TELEPHONE ENCOUNTER
Spoke with daughter and she said pt has been having dizzy spells everyday for a while now but today is a really bad day. Pt tried to eat something but threw it up. Daughter said pt does have history of dizziness and anton has seen her for it, I do not find it on record. Daughter was not forthcoming with information, just that she has been having these dizzy spells for a while and today is really bad and pt is throwing up. Advised ER/UC visit as we do not have any openings today in clinic. Daughter verbalized understanding.

## 2018-07-12 NOTE — TELEPHONE ENCOUNTER
Daughter would like a referral to go see Dr. Sg Márquez for her Mothers Infectious Disease.  She has seen him before and now they need a referral.  Please advise.  Thank You

## 2018-07-12 NOTE — TELEPHONE ENCOUNTER
Janis states the patient is really dizzy and threw up this morning and states she did not want to go to another clinic to be seen today. The daughter would like to talk to a nurse. Please call.    Thank you.

## 2018-07-13 NOTE — TELEPHONE ENCOUNTER
Spoke with daughter, her Mom is showing signs of the Lyme disease that she had in the past and she would like to return to  in [place]. Order pended, although unsure of what diagnosis to enter.

## 2018-07-17 NOTE — TELEPHONE ENCOUNTER
Find out from daughter what dr cisse;s specialty is (infectious disease? Rheumatology?). Then place the order. Thanks.

## 2018-07-18 NOTE — TELEPHONE ENCOUNTER
Most recent OV note faxed to  in [place]. Patient most likely will need to see primary instead per staff at [facility].

## 2018-08-16 ENCOUNTER — OFFICE VISIT (OUTPATIENT)
Dept: FAMILY MEDICINE | Facility: CLINIC | Age: 83
End: 2018-08-16
Payer: COMMERCIAL

## 2018-08-16 VITALS
WEIGHT: 181 LBS | DIASTOLIC BLOOD PRESSURE: 75 MMHG | OXYGEN SATURATION: 96 % | HEART RATE: 74 BPM | SYSTOLIC BLOOD PRESSURE: 131 MMHG | TEMPERATURE: 98.5 F | HEIGHT: 63 IN | BODY MASS INDEX: 32.07 KG/M2

## 2018-08-16 DIAGNOSIS — K21.9 GASTROESOPHAGEAL REFLUX DISEASE WITHOUT ESOPHAGITIS: ICD-10-CM

## 2018-08-16 DIAGNOSIS — G89.29 CHRONIC BILATERAL LOW BACK PAIN WITHOUT SCIATICA: ICD-10-CM

## 2018-08-16 DIAGNOSIS — M54.50 CHRONIC BILATERAL LOW BACK PAIN WITHOUT SCIATICA: ICD-10-CM

## 2018-08-16 DIAGNOSIS — J41.8 MIXED SIMPLE AND MUCOPURULENT CHRONIC BRONCHITIS (H): Primary | ICD-10-CM

## 2018-08-16 DIAGNOSIS — I48.20 CHRONIC ATRIAL FIBRILLATION (H): ICD-10-CM

## 2018-08-16 DIAGNOSIS — K04.7 TOOTH ABSCESS: ICD-10-CM

## 2018-08-16 PROCEDURE — 99214 OFFICE O/P EST MOD 30 MIN: CPT | Performed by: PHYSICIAN ASSISTANT

## 2018-08-16 RX ORDER — AMOXICILLIN 875 MG
875 TABLET ORAL 2 TIMES DAILY
Qty: 20 TABLET | Refills: 0 | Status: SHIPPED | OUTPATIENT
Start: 2018-08-16 | End: 2018-11-21

## 2018-08-16 NOTE — MR AVS SNAPSHOT
After Visit Summary   8/16/2018    Rosemarie Fry    MRN: [id number]           Patient Information     Date Of Birth          11/7/1934        Visit Information        Provider Department      8/16/2018 2:20 PM Alden Conklin PA-C [facility]        Today's Diagnoses     Mixed simple and mucopurulent chronic bronchitis (H)    -  1    Chronic atrial fibrillation (H)        Chronic bilateral low back pain without sciatica        Tooth abscess        Gastroesophageal reflux disease without esophagitis           Follow-ups after your visit        Additional Services     PULMONARY MEDICINE REFERRAL       Your provider has referred you to: [facility]: [facility] (Adults & Pediatrics) - [place] [phone]   [email or web address]    Please be aware that coverage of these services is subject to the terms and limitations of your health insurance plan.  Call member services at your health plan with any benefit or coverage questions.      Please bring the following to your appointment:  Any x-rays, CTs or MRIs which have been performed.  Contact the facility where they were done to arrange for  prior to your scheduled appointment.  Any new CT, MRI or other procedures ordered by your specialist must be performed at a [place] facility or coordinated by your clinic's referral office.    List of current medications   This referral request   Any documents/labs given to you for this referral            SPINE SURGERY REFERRAL       Please choose Medical Spine Specialist (unless patient was seen by a Medical Spine Specialist within the past 6 months).  Surgical Evaluation is advised if the patient presents with one or more of the following red flags:     **Cauda Equina Syndrome  **Evidence of Spinal Tumor  **Fracture  **Infection  **Loss of Bowel or Bladder Control  **Sudden or Progressive Weakness  **Any other documented emergent  "neurological condition resulting from a Lumbar Spinal Condition.    You have been referred to: Spine Lumbar: Medical Spine Specialist: [facility]: [place] [facility]  [phone]   [email or web address]    Please be aware that coverage of these services is subject to the terms and limitations of your health insurance plan.  Call member services at your health plan with any benefit or coverage questions.      Please bring the following to your appointment:    **Any x-rays, CTs or MRIs which have been performed.  Contact the facility where they were done to arrange for  prior to your scheduled appointment.    **List of current medications   **This referral request   **Any documents/labs given to you regarding this referral                  Who to contact     Normal or non-critical lab and imaging results will be communicated to you by MyChart, letter or phone within 4 business days after the clinic has received the results. If you do not hear from us within 7 days, please contact the clinic through MyChart or phone. If you have a critical or abnormal lab result, we will notify you by phone as soon as possible.  Submit refill requests through [employer] or call your pharmacy and they will forward the refill request to us. Please allow 3 business days for your refill to be completed.          If you need to speak with a  for additional information , please call: [phone]             Additional Information About Your Visit        Care EveryWhere ID     This is your Care EveryWhere ID. This could be used by other organizations to access your [place] medical records  [id number]        Your Vitals Were     Pulse Temperature Height Pulse Oximetry BMI (Body Mass Index)       74 98.5  F (36.9  C) (Tympanic) 5' 2.5\" (1.588 m) 96% 32.58 kg/m2        Blood Pressure from Last 3 Encounters: "   08/16/18 131/75   05/31/18 (!) 153/95   05/21/18 136/85    Weight from Last 3 Encounters:   08/16/18 181 lb (82.1 kg)   05/21/18 177 lb (80.3 kg)   05/10/18 177 lb (80.3 kg)              We Performed the Following     PULMONARY MEDICINE REFERRAL     SPINE SURGERY REFERRAL          Today's Medication Changes          These changes are accurate as of 8/16/18  3:20 PM.  If you have any questions, ask your nurse or doctor.               Start taking these medicines.        Dose/Directions    ranitidine 300 MG tablet   Commonly known as:  ZANTAC   Used for:  Gastroesophageal reflux disease without esophagitis   Started by:  Alden Conklin PA-C        Dose:  300 mg   Take 1 tablet (300 mg) by mouth At Bedtime   Quantity:  90 tablet   Refills:  3            Where to get your medicines      These medications were sent to [facility] [address] [place] [address] [facility] [address] [facility]  [address] [facility] [address]    Hours:  24-hours Phone:  [phone]     amoxicillin 875 MG tablet    ranitidine 300 MG tablet                Primary Care Provider Office Phone # Fax #    Alden Conklin PA-C [phone] [address] [place] [address]        Equal Access to Services     [facility] AH: Hadii aad ku hadasho Soomaali, waaxda luqadaha, qaybta kaalmada adeegyada, mendoza baker . So [facility] [place] [facility] [phone].    ATENCIÓN: Si habla español, tiene a liu disposición servicios gratuitos de asistencia lingüística. Nas al [phone].    We comply with applicable federal civil rights laws and [place] laws. We do not discriminate on the basis of race, color, national origin, age, disability, sex, sexual orientation, or gender identity.            Thank you!     Thank you for choosing [facility]  for your care. Our goal is always to provide you with excellent care. Hearing back from our patients is one way we can  continue to improve our services. Please take a few minutes to complete the written survey that you may receive in the mail after your visit with us. Thank you!             Your Updated Medication List - Protect others around you: Learn how to safely use, store and throw away your medicines at www.disposemymeds.org.          This list is accurate as of 8/16/18  3:20 PM.  Always use your most recent med list.                   Brand Name Dispense Instructions for use Diagnosis    albuterol 108 (90 Base) MCG/ACT inhaler    PROAIR HFA/PROVENTIL HFA/VENTOLIN HFA    1 Inhaler    Inhale 2 puffs into the lungs every 6 hours as needed for shortness of breath / dyspnea or wheezing    Mixed simple and mucopurulent chronic bronchitis (H)       amoxicillin 875 MG tablet    AMOXIL    20 tablet    Take 1 tablet (875 mg) by mouth 2 times daily    Tooth abscess       budesonide-formoterol 80-4.5 MCG/ACT Inhaler    SYMBICORT    1 Inhaler    Inhale 2 puffs into the lungs 2 times daily    Mixed simple and mucopurulent chronic bronchitis (H)       gabapentin 100 MG capsule    NEURONTIN    270 capsule    Week 1: 100 mg in the evening. Week 2: 100 mg times a day. Week 3 and afterwards : 100 mg three times a day PATIENT SAYS SHE WILL TAKE MEDICATION WHEN NEEDED BECAUSE SHE IS LEARY ABOUT THIS MEDICATION.    Analgesic rebound headache, Chronic tension-type headache, not intractable       metoprolol tartrate 25 MG tablet    LOPRESSOR     Take 25 mg by mouth 2 times daily        nitroGLYcerin 0.4 MG sublingual tablet    NITROSTAT    25 tablet    Place 1 tablet (0.4 mg) under the tongue every 5 minutes as needed for chest pain If you are still having symptoms after 3 doses (15 minutes) call 911.    Atypical chest pain       ranitidine 300 MG tablet    ZANTAC    90 tablet    Take 1 tablet (300 mg) by mouth At Bedtime    Gastroesophageal reflux disease without esophagitis       XARELTO 20 MG Tabs tablet   Generic drug:  rivaroxaban  ANTICOAGULANT      Take 1 tablet (20 mg) by mouth daily (with dinner)    Chronic atrial fibrillation (H)

## 2018-08-16 NOTE — PROGRESS NOTES
SUBJECTIVE:   Rosemarie Fry is a 83 year old female who presents to clinic today for the following health issues:    Chief Complaint   Patient presents with     Back Pain     on going lower back, shot helped for a few days, still painful. Pain also increases with SOB               Medication Request     antibiotic again, needs root sukumar, unable to have it done right now due to cost.      Also, noting more gerd as of late. Especially in the evening at when she lies down.   Not using symbicort daily. She was instructed to start.  Occasional cough. No fever. No chest pain/palps.   Problem list and histories reviewed & adjusted, as indicated.  Additional history: as documented    Patient Active Problem List   Diagnosis     Seasonal allergic rhinitis     Allergic rhinitis due to animal dander     CARDIOVASCULAR SCREENING; LDL GOAL LESS THAN 160     Osteoporosis     GERD (gastroesophageal reflux disease)     Advance Care Planning     COPD (chronic obstructive pulmonary disease) (H)     Shoulder impingement     Varicose veins of legs     Diagnostic skin and sensitization tests (aka ALLERGENS)     Cataract, mild-mod, ou     Posterior vitreous detachment, ou     Glaucoma suspect, ou     Chronic atrial fibrillation (H)     Subclinical hypothyroidism     Mixed simple and mucopurulent chronic bronchitis (H)     Past Surgical History:   Procedure Laterality Date     C APPENDECTOMY       LAPAROSCOPIC ASSISTED HYSTERECTOMY VAGINAL         Social History   Substance Use Topics     Smoking status: Former Smoker     Packs/day: 1.00     Years: 10.00     Types: Cigarettes     Quit date: 4/12/2007     Smokeless tobacco: Never Used     Alcohol use No     Family History   Problem Relation Age of Onset     Diabetes Brother      Hypertension Brother      Asthma Daughter      Cancer No family hx of      Cerebrovascular Disease No family hx of      Thyroid Disease No family hx of      Glaucoma No family hx of      Macular Degeneration No  family hx of          Current Outpatient Prescriptions   Medication Sig Dispense Refill     albuterol (PROAIR HFA/PROVENTIL HFA/VENTOLIN HFA) 108 (90 Base) MCG/ACT Inhaler Inhale 2 puffs into the lungs every 6 hours as needed for shortness of breath / dyspnea or wheezing 1 Inhaler 1     amoxicillin (AMOXIL) 875 MG tablet Take 1 tablet (875 mg) by mouth 2 times daily 20 tablet 0     budesonide-formoterol (SYMBICORT) 80-4.5 MCG/ACT Inhaler Inhale 2 puffs into the lungs 2 times daily 1 Inhaler 1     gabapentin (NEURONTIN) 100 MG capsule Week 1: 100 mg in the evening. Week 2: 100 mg times a day. Week 3 and afterwards : 100 mg three times a day  PATIENT SAYS SHE WILL TAKE MEDICATION WHEN NEEDED BECAUSE SHE IS LEARY ABOUT THIS MEDICATION. 270 capsule 0     metoprolol (LOPRESSOR) 25 MG tablet Take 25 mg by mouth 2 times daily        nitroglycerin (NITROSTAT) 0.4 MG SL tablet Place 1 tablet (0.4 mg) under the tongue every 5 minutes as needed for chest pain If you are still having symptoms after 3 doses (15 minutes) call 911. 25 tablet 0     ranitidine (ZANTAC) 300 MG tablet Take 1 tablet (300 mg) by mouth At Bedtime 90 tablet 3     rivaroxaban ANTICOAGULANT (XARELTO) 20 MG TABS tablet Take 1 tablet (20 mg) by mouth daily (with dinner)       Allergies   Allergen Reactions     Sulfa Drugs Rash     Recent Labs   Lab Test  05/21/18   1437  05/10/18   0954  09/02/16   1544  02/17/16   1621   04/09/14   0831  11/15/10   1407   LDL   --    --    --   141*   --   131*  134*   HDL   --    --    --   49*   --   44*  48*   TRIG   --    --    --   111   --   106  102   ALT   --    --   30  24   --    --    --    CR  0.86   --   0.96  0.82   < >   --    --    GFRESTIMATED  63   --   56*  66   < >   --    --    GFRESTBLACK  76   --   68  80   < >   --    --    POTASSIUM  4.4   --   4.2  4.4   < >   --    --    TSH   --   6.54*  7.08*  5.94*   < >   --    --     < > = values in this interval not displayed.      BP Readings from Last 3  Encounters:   08/16/18 131/75   05/31/18 (!) 153/95   05/21/18 136/85    Wt Readings from Last 3 Encounters:   08/16/18 181 lb (82.1 kg)   05/21/18 177 lb (80.3 kg)   05/10/18 177 lb (80.3 kg)                  Labs reviewed in EPIC    Reviewed and updated as needed this visit by clinical staff  Tobacco  Allergies  Meds  Problems  Med Hx  Surg Hx  Fam Hx  Soc Hx        Reviewed and updated as needed this visit by Provider  Tobacco  Allergies  Meds  Problems  Med Hx  Surg Hx  Fam Hx  Soc Hx          All other systems negative except as outline above  OBJECTIVE:  .Eye exam - right eye normal lid, conjunctiva, cornea, pupil and fundus, left eye normal lid, conjunctiva, cornea, pupil and fundus.  Thyroid not palpable, not enlarged, no nodules detected.  CHEST:chest clear to IPPA, no tachypnea, retractions or cyanosis and S1, S2 normal, no murmur, no gallop, rate regular.  BACK: Lumbosacral spine area reveals local tenderness.  Painful and reduced LS ROM noted. Straight leg raise is negative.  DTR's, motor strength and sensation normal, including heel and toe gait.  Perifpheral pulses are palpable.  Hipes and knees have full range of motion without pain.  No abdominal tenderness, mass or organomegaly.  A fib stable. Regular rate today    Rosemarie was seen today for back pain, dizziness and medication request.    Diagnoses and all orders for this visit:    Mixed simple and mucopurulent chronic bronchitis (H)  -     PULMONARY MEDICINE REFERRAL    Chronic atrial fibrillation (H)    Chronic bilateral low back pain without sciatica  -     SPINE SURGERY REFERRAL    Tooth abscess  -     amoxicillin (AMOXIL) 875 MG tablet; Take 1 tablet (875 mg) by mouth 2 times daily    Gastroesophageal reflux disease without esophagitis  -     ranitidine (ZANTAC) 300 MG tablet; Take 1 tablet (300 mg) by mouth At Bedtime      Advised supportive and symptomatic treatment.  Follow up with Provider - if condition persists or worsens.    work on lifestyle modification

## 2018-08-27 ENCOUNTER — OFFICE VISIT (OUTPATIENT)
Dept: ORTHOPEDICS | Facility: CLINIC | Age: 83
End: 2018-08-27
Payer: COMMERCIAL

## 2018-08-27 VITALS
DIASTOLIC BLOOD PRESSURE: 80 MMHG | WEIGHT: 181 LBS | BODY MASS INDEX: 32.07 KG/M2 | SYSTOLIC BLOOD PRESSURE: 140 MMHG | HEIGHT: 63 IN

## 2018-08-27 DIAGNOSIS — G89.29 CHRONIC BILATERAL LOW BACK PAIN WITHOUT SCIATICA: Primary | ICD-10-CM

## 2018-08-27 DIAGNOSIS — M54.50 CHRONIC BILATERAL LOW BACK PAIN WITHOUT SCIATICA: Primary | ICD-10-CM

## 2018-08-27 DIAGNOSIS — M47.816 FACET ARTHROPATHY, LUMBAR: ICD-10-CM

## 2018-08-27 PROCEDURE — 99213 OFFICE O/P EST LOW 20 MIN: CPT | Performed by: PEDIATRICS

## 2018-08-27 NOTE — MR AVS SNAPSHOT
After Visit Summary   8/27/2018    Rosemarie Fry    MRN: [id number]           Patient Information     Date Of Birth          11/7/1934        Visit Information        Provider Department      8/27/2018 9:40 AM Jeramy Dumont,  [facility]        Today's Diagnoses     Chronic bilateral low back pain without sciatica    -  1    Facet arthropathy, lumbar          Care Instructions    For the low back, with improvement the past few days, you can continue to monitor.  Options for the low back if pain returns:  Physical therapy  Injection    You may call for referral for injection if pain returns in the next few months. If the pain is different, then we may need to recheck first.      For the left knee, you may schedule an appointment to have that evaluated.          Follow-ups after your visit        Follow-up notes from your care team     Return if symptoms worsen or fail to improve.      Who to contact     If you have questions or need follow up information about today's clinic visit or your schedule please contact [facility] directly at [phone].  Normal or non-critical lab and imaging results will be communicated to you by MyChart, letter or phone within 4 business days after the clinic has received the results. If you do not hear from us within 7 days, please contact the clinic through MyChart or phone. If you have a critical or abnormal lab result, we will notify you by phone as soon as possible.  Submit refill requests through [employer] or call your pharmacy and they will forward the refill request to us. Please allow 3 business days for your refill to be completed.          Additional Information About Your Visit        Care EveryWhere ID     This is your Care EveryWhere ID. This could be used by other organizations to access your [facility] medical records  [id number]        Your Vitals Were     Height BMI (Body Mass Index)  "               5' 2.5\" (1.588 m) 32.58 kg/m2           Blood Pressure from Last 3 Encounters:   08/27/18 140/80   08/16/18 131/75   05/31/18 (!) 153/95    Weight from Last 3 Encounters:   08/27/18 181 lb (82.1 kg)   08/16/18 181 lb (82.1 kg)   05/21/18 177 lb (80.3 kg)              Today, you had the following     No orders found for display       Primary Care Provider Office Phone # Fax #    Alden Conklin PA-C [phone]       [address] [facility] [address]        Equal Access to Services     [facility]: Hadii ravi lovello Soollie, waaxda luqadaha, qaybta kaalmada adeegyada, mendoza baker . So [facility] [phone].    ATENCIÓN: Si habla español, tiene a liu disposición servicios gratuitos de asistencia lingüística. Llame al [phone].    We comply with applicable federal civil rights laws and [place] laws. We do not discriminate on the basis of race, color, national origin, age, disability, sex, sexual orientation, or gender identity.            Thank you!     Thank you for choosing [facility]  for your care. Our goal is always to provide you with excellent care. Hearing back from our patients is one way we can continue to improve our services. Please take a few minutes to complete the written survey that you may receive in the mail after your visit with us. Thank you!             Your Updated Medication List - Protect others around you: Learn how to safely use, store and throw away your medicines at www.disposemymeds.org.          This list is accurate as of 8/27/18 10:28 AM.  Always use your most recent med list.                   Brand Name Dispense Instructions for use Diagnosis    albuterol 108 (90 Base) MCG/ACT inhaler    PROAIR HFA/PROVENTIL HFA/VENTOLIN HFA    1 Inhaler    Inhale 2 puffs into the lungs every 6 hours as needed for shortness of breath / dyspnea or wheezing    Mixed simple and mucopurulent chronic " bronchitis (H)       amoxicillin 875 MG tablet    AMOXIL    20 tablet    Take 1 tablet (875 mg) by mouth 2 times daily    Tooth abscess       budesonide-formoterol 80-4.5 MCG/ACT Inhaler    SYMBICORT    1 Inhaler    Inhale 2 puffs into the lungs 2 times daily    Mixed simple and mucopurulent chronic bronchitis (H)       gabapentin 100 MG capsule    NEURONTIN    270 capsule    Week 1: 100 mg in the evening. Week 2: 100 mg times a day. Week 3 and afterwards : 100 mg three times a day PATIENT SAYS SHE WILL TAKE MEDICATION WHEN NEEDED BECAUSE SHE IS LEARY ABOUT THIS MEDICATION.    Analgesic rebound headache, Chronic tension-type headache, not intractable       metoprolol tartrate 25 MG tablet    LOPRESSOR     Take 25 mg by mouth 2 times daily        nitroGLYcerin 0.4 MG sublingual tablet    NITROSTAT    25 tablet    Place 1 tablet (0.4 mg) under the tongue every 5 minutes as needed for chest pain If you are still having symptoms after 3 doses (15 minutes) call 911.    Atypical chest pain       ranitidine 300 MG tablet    ZANTAC    90 tablet    Take 1 tablet (300 mg) by mouth At Bedtime    Gastroesophageal reflux disease without esophagitis       XARELTO 20 MG Tabs tablet   Generic drug:  rivaroxaban ANTICOAGULANT      Take 1 tablet (20 mg) by mouth daily (with dinner)    Chronic atrial fibrillation (H)

## 2018-08-27 NOTE — PATIENT INSTRUCTIONS
For the low back, with improvement the past few days, you can continue to monitor.  Options for the low back if pain returns:  Physical therapy  Injection    You may call for referral for injection if pain returns in the next few months. If the pain is different, then we may need to recheck first.      For the left knee, you may schedule an appointment to have that evaluated.

## 2018-08-27 NOTE — PROGRESS NOTES
"Sports Medicine Clinic Visit    PCP: Alden Conklin    Rosmearie Fry is a 83 year old female who is seen in f/u up for bilateral low back pain. Since last visit on 2/26/18 patient has had bilateral L5-S1 facet corticosteroid injection completed with [facility] on 3/23/18. Patient reports that the injection didn't give her any relief of pain. Today she has no lower back pain. When she does have pain it is across the lower back but denies any present radicular symptoms. Pain is worse with stairs and better with exercising.   **  Past couple days felt pretty good. 3-4 days without significant issue in back.  Biggest issue notes with using vacuum , gets some pain; has to stop for few mins, then can resume.    **  Results from injection: not clear. Felt like had some temporary benefit, but was not long lasting; yet, was ~6 months ago.    **  Has some lateral hip pain. Points over lateral pelvis, TFL.      Review of Systems  Unchanged since last visit unless noted above.    Past Medical History:   Diagnosis Date     Allergic rhinitis due to animal dander      Atrial fibrillation (H)     Xarelto     Cataract, mild-mod, ou 5/17/2015     Diagnostic skin and sensitization tests (aka ALLERGENS)     Skin test 5/5/10 pos. for:  cat/dog/T/RW     Ex-smoker      Lyme disease 1999     Giancarlo     Osteoporosis      Seasonal allergic rhinitis skin test 5/5/10 pos. for:  cat/dog/T/RW     Past Surgical History:   Procedure Laterality Date     C APPENDECTOMY       LAPAROSCOPIC ASSISTED HYSTERECTOMY VAGINAL         Objective  /80  Ht 5' 2.5\" (1.588 m)  Wt 181 lb (82.1 kg)  BMI 32.58 kg/m2    GENERAL APPEARANCE: healthy, alert and no distress   GAIT: NORMAL  SKIN: no suspicious lesions or rashes  NEURO: Normal strength and tone, mentation intact and speech normal  PSYCH:  mentation appears normal and affect normal/bright  HEENT: no scleral icterus  CV: no extremity edema   RESP: nonlabored breathing "     Exam  Low back exam:    Inspection:     no visible deformity in the low back       normal skin       normal vascular       normal lymphatic    ROM:        flexion - hands to floor (with bending knees), no pain        extension - no pain        Lateral bending no pain        rotation - no pain     Strength:     hip flexion        knee extension        ankle dorsiflexion        ankle plantarflexion        dorsiflexion of the great toe        Knee flexion        No pain with above   symmetric, intact    Sensation:    grossly intact throughout lower extremities    Special tests:             slump test negative bilaterally          Radiology  Prior imaging reviewed.    MRI of the lumbar spine from 10/12/2017, and reviewed images and report with patient.  MRI demonstrates multilevel degenerative change, including facet arthrosis.    Final Report  MR *MR LUMBAR SPINE wo CONTRAST  Show Printer-Friendly Version with Images (4 of 4)   Show Printer-Friendly Version without images  Patient Name:  Rosemarie Fry     :       ID:  [id number]([place])     Study Date:  Oct- 12:03        Clinical History: Lumbar spine pain. Left lower extremity   radiculopathy.     Technique: Multiplanar multisequence lumbar spine MRI without   contrast.      Comparison:  None.     Findings: Upper endplate compression fractures at T11, T12 and L1   appear well-healed. 15% height loss at T11, 25% height loss at T12   and up to 70%  height loss at L1 noted. Slight kyphosis centered at   T12-L1. No evidence for acute or subacute fracture. No suspicious   marrow signal replacement.      Normal cauda equina and conus medullaris. Sacral canal perineural   cysts incidentally noted.     Bilateral renal cyst-like lesions are present. Mild SI joint   degenerative changes bilaterally.     T10-11: Minimal disc bulge. Patent central canal and foramina.     T11-12: Mild disc bulge. Patent central canal and foramen.     T12-L1: Mild to  moderate broad-based disc bulge. Patent central   canal. Bilateral neural foraminal perineural cysts.     L1-2: Disc desiccation. Patent central canal and foramina. Perineural   cyst along the exiting right L1 nerve.     L2-3: Patent central canal and foramina.  Perineural cyst noted along the exiting right L2 nerve.     L3-4: Disc desiccation and mild posterior disc bulge. Patent central   canal and foramina.     L4-5: Disc desiccation, height loss and mild posterior disc bulge.   Facet and ligamentous degenerative changes left side more so than   right. There is borderline narrowing of the left lateral recess.   Central canal is adequate. Moderate left and minimal right foraminal   stenosis. Slight contact upon the exiting left L4 nerve.     L5-S1: 7 mm degenerative anterolisthesis. Disc desiccation, height   loss and mild disc bulge. Severe facet and ligamentous degenerative   changes. Moderately severe central canal stenosis. Lateral recesses   is narrowed. Moderate left and mild-to-moderate right foraminal   stenosis. L5 nerve contact is present left side more so than right.   This may correlate with the patient's left-sided radicular symptoms.     Impression: Multilevel lumbar spondylosis most pronounced at L5-S1.     Signed by: Duncan Cheng Signed on: Oct- 16:18   =========================================  Reviewed prior MRI:     Results for orders placed or performed in visit on 01/31/17   MR Lumbar Spine w/o Contrast    Narrative    MRI LUMBAR SPINE WITHOUT CONTRAST January 31, 2017 1:04 PM     HISTORY: Left lumbar radiculopathy. Lumbago with sciatica, left side.    TECHNIQUE: Multiplanar multisequence MRI of the lumbar spine without  contrast.    COMPARISON: X-ray from 1/27/2017.    FINDINGS: The report is dictated assuming five lumbar-type vertebral  bodies.  Chronic superior endplate compression fracture L1 with loss  of approximately 50% of central and anterior vertebral body height.  Schmorl's  nodes at the lower thoracic levels imaged. Bone marrow  signal is unremarkable. Tip of the conus medullaris and cauda equina  are unremarkable. Axial scans were performed from T11 to sacrum.    T11-T12: Mild disc bulge without stenosis.    T12-L1: Mild disc bulge and osteophytic ridging. Mild central  stenosis. Neural foramen are patent.    L1-L2: Mild disc bulge. No central stenosis. Neural foramen are  patent. Facet joints are unremarkable.    L2-L3: Mild disc bulge. No central stenosis. Neural foramen are  patent. Facet joints are unremarkable.    L3-L4: Mild disc bulge. No central stenosis. Neural foramen are  patent. Facet joints are unremarkable.    L4-L5: Broad-based disc bulge. Mild left facet degenerative changes.  Mild central stenosis. Mild right and mild to moderate left foraminal  stenosis.    L5-S1: Prominent facet hypertrophy. Mild grade 1 degenerative  spondylolisthesis. Broad-based disc bulge. Moderate central stenosis.  Right neural foramen is patent. Moderate left foraminal stenosis.    Paraspinous soft tissues: Multiple nerve root sleeve cysts seen  throughout.      Impression    IMPRESSION:    1. At T12-L1 there is mild central stenosis.  2. At L4-L5 there is mild central stenosis. Mild right and mild to  moderate left foraminal stenosis.  3. At L5-S1 there is grade 1 degenerative spondylolisthesis with  moderate central stenosis. Moderate left foraminal stenosis.    EZEQUIEL URBAN MD         Assessment:  1. Chronic bilateral low back pain without sciatica    2. Facet arthropathy, lumbar        Plan:  Discussed the assessment with the patient.  Improved of late. She will primarily monitor for now. May be interested in repeat injection, though question amount of relief she had.  Considerations: PT, repeat injection.  Follow up: she will monitor for now and if symptoms increase again, can consider repeat injection through pain mgmt.  Questions answered. The patient indicates understanding of these  issues and agrees with the plan.    Jeramy Dumont, DO, CAQ        Patient Instructions   For the low back, with improvement the past few days, you can continue to monitor.  Options for the low back if pain returns:  Physical therapy  Injection    You may call for referral for injection if pain returns in the next few months. If the pain is different, then we may need to recheck first.      For the left knee, you may schedule an appointment to have that evaluated.        Disclaimer: This note consists of symbols derived from keyboarding, dictation and/or voice recognition software. As a result, there may be errors in the script that have gone undetected. Please consider this when interpreting information found in this chart.

## 2018-09-17 ENCOUNTER — TELEPHONE (OUTPATIENT)
Dept: ORTHOPEDICS | Facility: CLINIC | Age: 83
End: 2018-09-17

## 2018-09-17 DIAGNOSIS — M47.816 FACET ARTHROPATHY, LUMBAR: ICD-10-CM

## 2018-09-17 DIAGNOSIS — G89.29 CHRONIC BILATERAL LOW BACK PAIN WITHOUT SCIATICA: Primary | ICD-10-CM

## 2018-09-17 DIAGNOSIS — M54.50 CHRONIC BILATERAL LOW BACK PAIN WITHOUT SCIATICA: Primary | ICD-10-CM

## 2018-09-17 DIAGNOSIS — J42 CHRONIC BRONCHITIS (H): Primary | ICD-10-CM

## 2018-09-17 NOTE — TELEPHONE ENCOUNTER
Discussed with nivia Alvarez for repeat injection, pain management referral placed  Yamila Rodriguez MS ATC

## 2018-09-17 NOTE — TELEPHONE ENCOUNTER
Patient's mother contacted [facility]  about scheduling a repeat injection.    Note from 8/27/18 incomplete, please advise on repeat injection.    Yamila Rodriguez MS ATC

## 2018-11-09 NOTE — TELEPHONE ENCOUNTER
FUTURE VISIT INFORMATION      FUTURE VISIT INFORMATION:    Date: 11.21.18    Time: 3:35 PM    [facility]  REFERRAL INFORMATION:    Referring provider:  Dr. Conklin    Referring providers clinic:  [facility]    Reason for visit/diagnosis Chronic bronchitis    RECORDS REQUESTED FROM:       Clinic name Comments Records Status Imaging Status   [facility] Referral placed 8.16.18 Referral                                      RECORDS RECEIVED FROM: [facility]   DATE RECEIVED: 11.21.18   NOTES STATUS DETAILS   OFFICE NOTE from referring provider Internal 8.16.18 5.21.18   OFFICE NOTE from other specialist N/A    DISCHARGE SUMMARY from hospital N/A    DISCHARGE REPORT from the ER N/A    OPERATIVE REPORT N/A    MEDICATION LIST Internal    IMAGING  (NEED IMAGES AND REPORTS)     CT SCAN N/A    CHEST XRAY (CXR) In process 5.21.18  9.2.16    Scheduled for 11.21.18   TESTS     PULMONARY FUNCTION TESTING (PFT) In process Scheduled for 11.21.18   FLOW LOOP VOLUME (FVL) Internal 5.21.18   CYSTIC FIBROSIS     CF SPUTUM CULTURE N/A

## 2018-11-21 ENCOUNTER — OFFICE VISIT (OUTPATIENT)
Dept: PULMONOLOGY | Facility: CLINIC | Age: 83
End: 2018-11-21
Attending: INTERNAL MEDICINE
Payer: MEDICARE

## 2018-11-21 ENCOUNTER — RADIANT APPOINTMENT (OUTPATIENT)
Dept: GENERAL RADIOLOGY | Facility: CLINIC | Age: 83
End: 2018-11-21
Payer: COMMERCIAL

## 2018-11-21 ENCOUNTER — PRE VISIT (OUTPATIENT)
Dept: PULMONOLOGY | Facility: CLINIC | Age: 83
End: 2018-11-21

## 2018-11-21 VITALS
SYSTOLIC BLOOD PRESSURE: 162 MMHG | RESPIRATION RATE: 18 BRPM | BODY MASS INDEX: 32.58 KG/M2 | DIASTOLIC BLOOD PRESSURE: 74 MMHG | OXYGEN SATURATION: 95 % | HEIGHT: 63 IN | HEART RATE: 74 BPM

## 2018-11-21 DIAGNOSIS — J42 CHRONIC BRONCHITIS (H): ICD-10-CM

## 2018-11-21 DIAGNOSIS — J41.8 MIXED SIMPLE AND MUCOPURULENT CHRONIC BRONCHITIS (H): ICD-10-CM

## 2018-11-21 PROCEDURE — G0463 HOSPITAL OUTPT CLINIC VISIT: HCPCS | Mod: ZF

## 2018-11-21 ASSESSMENT — PAIN SCALES - GENERAL: PAINLEVEL: NO PAIN (0)

## 2018-11-21 NOTE — PROGRESS NOTES
Pulmonary Clinic  New Patient Evaluation    Name: Rosemarie Fry MRN: [id number]     Age: 84 year old   YOB: 1934             HPI:   CC: Dyspnea on exertion, occasional cough    Rosemarie Fry is a 84 year old female with H/O allergic rhinitis and seasonal allergies who presents to discuss progressive CORREA over the past few years.    She reports intermittent CORREA and cough which have progressed over the past 1-2 years, though she is still able to walk a few blocks without stopping. During the summer she walk around her neighborhood most days, though she doesn't get any regular exercise in the winter. She has not noted any seasonal variation in her symptoms, or any association with cold or warm temperatures. She has an albuterol inhaler which she uses when she becomes dyspneic with moderate improvement in her symptoms which lasts approximately 20 minutes..  She was also prescribed Symbicort however, she did not notice immediate improvement with the use of this inhaler so she has not continued its use.    She occasionally experiences a cough with exertion, but she mentions that she frequently coughs when swallowing. Most days of the week she experiences at least one mild aspiration event while swallowing.    She suffers from chronic post nasal drip as well and sinus congestion.    She has a history of GERD which is completely asymptomatic since starting zantac.    She has a 10-pack-year smoking history (1 pack/day x 10 years).  She quit in 2007.             Past Medical History:     Past Medical History:   Diagnosis Date     Allergic rhinitis due to animal dander      Atrial fibrillation (H)     Xarelto     Cataract, mild-mod, ou 5/17/2015     Diagnostic skin and sensitization tests (aka ALLERGENS)     Skin test 5/5/10 pos. for:  cat/dog/T/RW     Ex-smoker      Lyme disease 1999     Giancarlo     Osteoporosis      Seasonal allergic rhinitis skin test 5/5/10 pos. for:  cat/dog/T/RW              Past Surgical History:      Past Surgical History:   Procedure Laterality Date     C APPENDECTOMY       LAPAROSCOPIC ASSISTED HYSTERECTOMY VAGINAL               Social History:     Social History     Social History     Marital status: Single     Spouse name: N/A     Number of children: 5     Years of education: 8     Occupational History     [employer]     15 years     Social History Main Topics     Smoking status: Former Smoker     Packs/day: 1.00     Years: 10.00     Types: Cigarettes     Quit date: 4/12/2007     Smokeless tobacco: Never Used     Alcohol use No     Drug use: No     Sexual activity: No      Comment: PARTIAL HYST     Other Topics Concern     Not on file     Social History Narrative            Family History:     Family History   Problem Relation Age of Onset     Diabetes Brother      Hypertension Brother      Asthma Daughter      Cancer No family hx of      Cerebrovascular Disease No family hx of      Thyroid Disease No family hx of      Glaucoma No family hx of      Macular Degeneration No family hx of              Immunizations:     Immunization History   Administered Date(s) Administered     Influenza (High Dose) 3 valent vaccine 11/18/2013     Pneumococcal 23 valent 08/26/2008             Allergies:     Allergies   Allergen Reactions     Sulfa Drugs Rash             Medications:     Current Outpatient Prescriptions on File Prior to Visit:  albuterol (PROAIR HFA/PROVENTIL HFA/VENTOLIN HFA) 108 (90 Base) MCG/ACT Inhaler Inhale 2 puffs into the lungs every 6 hours as needed for shortness of breath / dyspnea or wheezing   amoxicillin (AMOXIL) 875 MG tablet Take 1 tablet (875 mg) by mouth 2 times daily   budesonide-formoterol (SYMBICORT) 80-4.5 MCG/ACT Inhaler Inhale 2 puffs into the lungs 2 times daily   gabapentin (NEURONTIN) 100 MG capsule Week 1: 100 mg in the evening. Week 2: 100 mg times a day. Week 3 and afterwards : 100 mg three times a dayPATIENT SAYS SHE WILL TAKE MEDICATION WHEN NEEDED  "BECAUSE SHE IS LEARY ABOUT THIS MEDICATION.   metoprolol (LOPRESSOR) 25 MG tablet Take 25 mg by mouth 2 times daily    nitroglycerin (NITROSTAT) 0.4 MG SL tablet Place 1 tablet (0.4 mg) under the tongue every 5 minutes as needed for chest pain If you are still having symptoms after 3 doses (15 minutes) call 911.   ranitidine (ZANTAC) 300 MG tablet Take 1 tablet (300 mg) by mouth At Bedtime   rivaroxaban ANTICOAGULANT (XARELTO) 20 MG TABS tablet Take 1 tablet (20 mg) by mouth daily (with dinner)     Current Facility-Administered Medications on File Prior to Visit:  iohexol (OMNIPAQUE) 300 mg/mL injection 10 mL            Review of Systems:     Review of Systems   Constitutional: Negative.    HENT: Positive for congestion. Negative for nosebleeds and sinus pain.    Eyes: Negative.    Respiratory: Positive for cough and shortness of breath. Negative for sputum production, wheezing and stridor.    Cardiovascular: Negative for chest pain, orthopnea, claudication, leg swelling and PND.   Gastrointestinal: Negative for abdominal pain, diarrhea, heartburn, nausea and vomiting.   Genitourinary: Negative.    Musculoskeletal: Positive for back pain and myalgias.   Skin: Negative.    Neurological: Negative for loss of consciousness.   Endo/Heme/Allergies: Positive for environmental allergies. Bruises/bleeds easily.              Exam:   /74  Pulse 74  Resp 18  Ht 1.588 m (5' 2.5\")  SpO2 95%  BMI 32.58 kg/m2    Physical Exam   Constitutional: She is oriented to person, place, and time and well-developed, well-nourished, and in no distress. No distress.   HENT:   Head: Normocephalic and atraumatic.   Right Ear: External ear normal.   Left Ear: External ear normal.   Nose: Nose normal.   Mouth/Throat: Oropharynx is clear and moist. No oropharyngeal exudate.   Eyes: Conjunctivae and EOM are normal. Pupils are equal, round, and reactive to light. No scleral icterus.   Neck: Normal range of motion. No tracheal deviation " present. No thyromegaly present.   Cardiovascular: Normal rate, regular rhythm, normal heart sounds and intact distal pulses.    No murmur heard.  Pulmonary/Chest: Effort normal and breath sounds normal. No respiratory distress. She has no wheezes. She has no rales.   Abdominal: Soft. There is no tenderness.   Musculoskeletal: Normal range of motion. She exhibits no edema.   Lymphadenopathy:     She has no cervical adenopathy.   Neurological: She is alert and oriented to person, place, and time. GCS score is 15.   Skin: Skin is warm and dry. She is not diaphoretic.   Nursing note and vitals reviewed.    Fingernails without clubbing         Data:     PFT: 11/21/18      The FVC, FEV1, and FEV1/FVC ratio are within normal limits. The Lung volumes are diffusion capacity are within normal limits.    CXR 11/21/18  1. Opacification of the right costophrenic angle, likely atelectasis  but can not rule out infection.  2. Wedge deformities of lower thoracic vertebra.  3. Hiatal hernia.           Assessment and Plan:     Rosemarie Fry is a 84 year old female with H/O allergic rhinitis and seasonal allergies who presents to discuss progressive CORREA over the past few years.    ## Dyspnea on exertion  ## Intermittent cough  ## Possible small airways disease  ## Possible chronic aspiration  She reports mild dyspnea on exertion with slow progression over the past 1-2 years.  Despite this, she is still able to walk several blocks.  The etiology is not entirely clear at this time, though I suspect it to be multifactorial from age-related changes, obesity, deconditioning, and possible small airways disease.  Her imaging does not reveal any source for her respiratory complaints.  Her PFTs reveal normal airflows, though the flow volume loop does show slight scooping.  This, combined with her subjective improvement with albuterol could be consistent with small airways disease.  She has previously been prescribed a maintenance inhaler  (Symbicort) but only used it a few times and then discontinued it due to a lack of immediate benefit.  We discussed that if rescue inhalers and maintenance inhalers, and the importance of using her maintenance inhaler regularly.  I advised her to use her Symbicort as prescribed for at least 2-3 weeks to determine if there is any improvement in her symptoms.  If she has not noticed improvement after this time, she can discontinue the medication.  She does walk on a regular basis, however she is concerned that her dyspnea represents a more serious problem and has been reluctant to exercise to the point of dyspnea stating that she tries not to exert herself.  Therefore, I advised that she engage in a regular exercise program.  I offered her pulmonary rehab which she declined, but she plans to enroll in exercise classes through the [facility] with her daughter.  She has had positive allergy testing in the past, and complains of chronic rhinitis and sinus congestion.  This could be contributing to her cough.  I discussed this with her and advised nasal saline rinses and a steroid nasal spray.  She said she will think about using these in the future, but is not bothered enough to use them now.  Finally, she reports frequent coughing spells while swallowing suggesting recurrent aspiration.  This could also be contributing to her cough however, again she is not currently interested in further workup as she does not think this is a serious concern.  -Use Symbicort consistently for 2-3 weeks.  If symptoms do not improve, this can be discontinued.  -Engage in regular exercise  -I recommend nasal washes and a steroid nasal spray for postnasal drip and congestion.  She declined this, but these measures could be considered in the future.  -I also recommended further evaluation of her likely frequent aspiration.  Again, she declined this, but this could be considered in the future should her problems persist      RTC: PRN    Patient  staffed with Dr. Oscar Luevano M.D.  Pulmonary & Critical Care Fellow  Pager [phone]    Physician Attestation   I, Kartik Moore, saw this patient and agree with the findings and plan of care as documented in the note.      Items personally reviewed/procedural attestation: vitals, labs, imaging and agree with the interpretation documented in the note and spirometry report and agree with the interpretation documented in the note.    Kartik Moore MD

## 2018-11-21 NOTE — MR AVS SNAPSHOT
"              After Visit Summary   11/21/2018    Rosemarie Fry    MRN: [id number]           Patient Information     Date Of Birth          11/7/1934        Visit Information        Provider Department      11/21/2018 3:35 PM Conor Luevano MD [facility]        Today's Diagnoses     Mixed simple and mucopurulent chronic bronchitis (H)           Follow-ups after your visit        Follow-up notes from your care team     Return if symptoms worsen or fail to improve.      Who to contact     If you have questions or need follow up information about today's clinic visit or your schedule please contact [facility] directly at [phone].  Normal or non-critical lab and imaging results will be communicated to you by MyChart, letter or phone within 4 business days after the clinic has received the results. If you do not hear from us within 7 days, please contact the clinic through MyChart or phone. If you have a critical or abnormal lab result, we will notify you by phone as soon as possible.  Submit refill requests through [employer] or call your pharmacy and they will forward the refill request to us. Please allow 3 business days for your refill to be completed.          Additional Information About Your Visit        Care EveryWhere ID     This is your Care EveryWhere ID. This could be used by other organizations to access your [place] medical records  [id number]        Your Vitals Were     Pulse Respirations Height Pulse Oximetry BMI (Body Mass Index)       74 18 1.588 m (5' 2.5\") 95% 32.58 kg/m2        Blood Pressure from Last 3 Encounters:   11/26/18 130/70   11/21/18 162/74   08/27/18 140/80    Weight from Last 3 Encounters:   11/26/18 82.6 kg (182 lb)   08/27/18 82.1 kg (181 lb)   08/16/18 82.1 kg (181 lb)              Today, you had the following     No orders found for display         Today's Medication Changes          These changes " are accurate as of 11/21/18 11:59 PM.  If you have any questions, ask your nurse or doctor.               Stop taking these medicines if you haven't already. Please contact your care team if you have questions.     amoxicillin 875 MG tablet   Commonly known as:  AMOXIL   Stopped by:  Conor Luevano MD                    Primary Care Provider Office Phone # Fax #    Alden Torres ADEBAYO Conklin [phone]       [address]  [place] [address]        Equal Access to Services     [facility]: Hadii aad ku hadasho Soomaali, waaxda luqadaha, qaybta kaalmada adeegyada, waxay idiin hayaan adeeg kharash lasri . So [facility] [phone].    ATENCIÓN: Si habla español, tiene a liu disposición servicios gratuitos de asistencia lingüística. [facility] [phone].    We comply with applicable federal civil rights laws and [place] laws. We do not discriminate on the basis of race, color, national origin, age, disability, sex, sexual orientation, or gender identity.            Thank you!     Thank you for choosing [facility]  for your care. Our goal is always to provide you with excellent care. Hearing back from our patients is one way we can continue to improve our services. Please take a few minutes to complete the written survey that you may receive in the mail after your visit with us. Thank you!             Your Updated Medication List - Protect others around you: Learn how to safely use, store and throw away your medicines at www.disposemymeds.org.          This list is accurate as of 11/21/18 11:59 PM.  Always use your most recent med list.                   Brand Name Dispense Instructions for use Diagnosis    albuterol 108 (90 Base) MCG/ACT inhaler    PROAIR HFA/PROVENTIL HFA/VENTOLIN HFA    1 Inhaler    Inhale 2 puffs into the lungs every 6 hours as needed for shortness of breath / dyspnea or wheezing    Mixed simple and mucopurulent chronic bronchitis (H)        budesonide-formoterol 80-4.5 MCG/ACT Inhaler    SYMBICORT    1 Inhaler    Inhale 2 puffs into the lungs 2 times daily    Mixed simple and mucopurulent chronic bronchitis (H)       gabapentin 100 MG capsule    NEURONTIN    270 capsule    Week 1: 100 mg in the evening. Week 2: 100 mg times a day. Week 3 and afterwards : 100 mg three times a day PATIENT SAYS SHE WILL TAKE MEDICATION WHEN NEEDED BECAUSE SHE IS LEARY ABOUT THIS MEDICATION.    Analgesic rebound headache, Chronic tension-type headache, not intractable       metoprolol tartrate 25 MG tablet    LOPRESSOR     Take 25 mg by mouth 2 times daily        nitroGLYcerin 0.4 MG sublingual tablet    NITROSTAT    25 tablet    Place 1 tablet (0.4 mg) under the tongue every 5 minutes as needed for chest pain If you are still having symptoms after 3 doses (15 minutes) call 911.    Atypical chest pain       ranitidine 300 MG tablet    ZANTAC    90 tablet    Take 1 tablet (300 mg) by mouth At Bedtime    Gastroesophageal reflux disease without esophagitis       XARELTO 20 MG Tabs tablet   Generic drug:  rivaroxaban ANTICOAGULANT      Take 1 tablet (20 mg) by mouth daily (with dinner)    Chronic atrial fibrillation (H)

## 2018-11-21 NOTE — NURSING NOTE
Chief Complaint   Patient presents with     Consult     New consult on Rosemarie and her Mucopurulent chronic bronchitis     Timothy Last CMA at 3:27 PM on 11/21/2018

## 2018-11-21 NOTE — NURSING NOTE
Chief Complaint   Patient presents with     Consult     New consult on Rosemarie and her Mucopurulent chronic bronchitis

## 2018-11-21 NOTE — LETTER
11/21/2018       RE: Rosemarie Fry  [id number] [facility] [address]     Dear Colleague,    Thank you for referring your patient, Rosemarie Fry, to the [facility] FOR LUNG SCIENCE AND HEALTH at [facility]. Please see a copy of my visit note below.              Pulmonary Clinic  New Patient Evaluation    Name: Rosemarie Fry MRN: [id number]     Age: 84 year old   YOB: 1934             HPI:   CC: Dyspnea on exertion, occasional cough    Rosemarie Fry is a 84 year old female with H/O allergic rhinitis and seasonal allergies who presents to discuss progressive CORREA over the past few years.    She reports intermittent CORREA and cough which have progressed over the past 1-2 years, though she is still able to walk a few blocks without stopping. During the summer she walk around her neighborhood most days, though she doesn't get any regular exercise in the winter. She has not noted any seasonal variation in her symptoms, or any association with cold or warm temperatures. She has an albuterol inhaler which she uses when she becomes dyspneic with moderate improvement in her symptoms which lasts approximately 20 minutes..  She was also prescribed Symbicort however, she did not notice immediate improvement with the use of this inhaler so she has not continued its use.    She occasionally experiences a cough with exertion, but she mentions that she frequently coughs when swallowing. Most days of the week she experiences at least one mild aspiration event while swallowing.    She suffers from chronic post nasal drip as well and sinus congestion.    She has a history of GERD which is completely asymptomatic since starting zantac.    She has a 10-pack-year smoking history (1 pack/day x 10 years).  She quit in 2007.             Past Medical History:     Past Medical History:   Diagnosis Date     Allergic rhinitis due to animal dander      Atrial fibrillation (H)      Xarelto     Cataract, mild-mod, ou 5/17/2015     Diagnostic skin and sensitization tests (aka ALLERGENS)     Skin test 5/5/10 pos. for:  cat/dog/T/RW     Ex-smoker      Lyme disease 1999     Giancarlo     Osteoporosis      Seasonal allergic rhinitis skin test 5/5/10 pos. for:  cat/dog/T/RW             Past Surgical History:      Past Surgical History:   Procedure Laterality Date     C APPENDECTOMY       LAPAROSCOPIC ASSISTED HYSTERECTOMY VAGINAL               Social History:     Social History     Social History     Marital status: Single     Spouse name: N/A     Number of children: 5     Years of education: 8     Occupational History     [employer]     15 years     Social History Main Topics     Smoking status: Former Smoker     Packs/day: 1.00     Years: 10.00     Types: Cigarettes     Quit date: 4/12/2007     Smokeless tobacco: Never Used     Alcohol use No     Drug use: No     Sexual activity: No      Comment: PARTIAL HYST     Other Topics Concern     Not on file     Social History Narrative            Family History:     Family History   Problem Relation Age of Onset     Diabetes Brother      Hypertension Brother      Asthma Daughter      Cancer No family hx of      Cerebrovascular Disease No family hx of      Thyroid Disease No family hx of      Glaucoma No family hx of      Macular Degeneration No family hx of              Immunizations:     Immunization History   Administered Date(s) Administered     Influenza (High Dose) 3 valent vaccine 11/18/2013     Pneumococcal 23 valent 08/26/2008             Allergies:     Allergies   Allergen Reactions     Sulfa Drugs Rash             Medications:     Current Outpatient Prescriptions on File Prior to Visit:  albuterol (PROAIR HFA/PROVENTIL HFA/VENTOLIN HFA) 108 (90 Base) MCG/ACT Inhaler Inhale 2 puffs into the lungs every 6 hours as needed for shortness of breath / dyspnea or wheezing   amoxicillin (AMOXIL) 875 MG tablet Take 1 tablet (875 mg) by mouth 2 times  "daily   budesonide-formoterol (SYMBICORT) 80-4.5 MCG/ACT Inhaler Inhale 2 puffs into the lungs 2 times daily   gabapentin (NEURONTIN) 100 MG capsule Week 1: 100 mg in the evening. Week 2: 100 mg times a day. Week 3 and afterwards : 100 mg three times a dayPATIENT SAYS SHE WILL TAKE MEDICATION WHEN NEEDED BECAUSE SHE IS LEARY ABOUT THIS MEDICATION.   metoprolol (LOPRESSOR) 25 MG tablet Take 25 mg by mouth 2 times daily    nitroglycerin (NITROSTAT) 0.4 MG SL tablet Place 1 tablet (0.4 mg) under the tongue every 5 minutes as needed for chest pain If you are still having symptoms after 3 doses (15 minutes) call 911.   ranitidine (ZANTAC) 300 MG tablet Take 1 tablet (300 mg) by mouth At Bedtime   rivaroxaban ANTICOAGULANT (XARELTO) 20 MG TABS tablet Take 1 tablet (20 mg) by mouth daily (with dinner)     Current Facility-Administered Medications on File Prior to Visit:  iohexol (OMNIPAQUE) 300 mg/mL injection 10 mL            Review of Systems:     Review of Systems   Constitutional: Negative.    HENT: Positive for congestion. Negative for nosebleeds and sinus pain.    Eyes: Negative.    Respiratory: Positive for cough and shortness of breath. Negative for sputum production, wheezing and stridor.    Cardiovascular: Negative for chest pain, orthopnea, claudication, leg swelling and PND.   Gastrointestinal: Negative for abdominal pain, diarrhea, heartburn, nausea and vomiting.   Genitourinary: Negative.    Musculoskeletal: Positive for back pain and myalgias.   Skin: Negative.    Neurological: Negative for loss of consciousness.   Endo/Heme/Allergies: Positive for environmental allergies. Bruises/bleeds easily.              Exam:   /74  Pulse 74  Resp 18  Ht 1.588 m (5' 2.5\")  SpO2 95%  BMI 32.58 kg/m2    Physical Exam   Constitutional: She is oriented to person, place, and time and well-developed, well-nourished, and in no distress. No distress.   HENT:   Head: Normocephalic and atraumatic.   Right Ear: External " ear normal.   Left Ear: External ear normal.   Nose: Nose normal.   Mouth/Throat: Oropharynx is clear and moist. No oropharyngeal exudate.   Eyes: Conjunctivae and EOM are normal. Pupils are equal, round, and reactive to light. No scleral icterus.   Neck: Normal range of motion. No tracheal deviation present. No thyromegaly present.   Cardiovascular: Normal rate, regular rhythm, normal heart sounds and intact distal pulses.    No murmur heard.  Pulmonary/Chest: Effort normal and breath sounds normal. No respiratory distress. She has no wheezes. She has no rales.   Abdominal: Soft. There is no tenderness.   Musculoskeletal: Normal range of motion. She exhibits no edema.   Lymphadenopathy:     She has no cervical adenopathy.   Neurological: She is alert and oriented to person, place, and time. GCS score is 15.   Skin: Skin is warm and dry. She is not diaphoretic.   Nursing note and vitals reviewed.    Fingernails without clubbing         Data:     PFT: 11/21/18      The FVC, FEV1, and FEV1/FVC ratio are within normal limits. The Lung volumes are diffusion capacity are within normal limits.    CXR 11/21/18  1. Opacification of the right costophrenic angle, likely atelectasis  but can not rule out infection.  2. Wedge deformities of lower thoracic vertebra.  3. Hiatal hernia.           Assessment and Plan:     Rosemarie Fry is a 84 year old female with H/O allergic rhinitis and seasonal allergies who presents to discuss progressive CORREA over the past few years.    ## Dyspnea on exertion  ## Intermittent cough  ## Possible small airways disease  ## Possible chronic aspiration  She reports mild dyspnea on exertion with slow progression over the past 1-2 years.  Despite this, she is still able to walk several blocks.  The etiology is not entirely clear at this time, though I suspect it to be multifactorial from age-related changes, obesity, deconditioning, and possible small airways disease.  Her imaging does not reveal  any source for her respiratory complaints.  Her PFTs reveal normal airflows, though the flow volume loop does show slight scooping.  This, combined with her subjective improvement with albuterol could be consistent with small airways disease.  She has previously been prescribed a maintenance inhaler (Symbicort) but only used it a few times and then discontinued it due to a lack of immediate benefit.  We discussed that if rescue inhalers and maintenance inhalers, and the importance of using her maintenance inhaler regularly.  I advised her to use her Symbicort as prescribed for at least 2-3 weeks to determine if there is any improvement in her symptoms.  If she has not noticed improvement after this time, she can discontinue the medication.  She does walk on a regular basis, however she is concerned that her dyspnea represents a more serious problem and has been reluctant to exercise to the point of dyspnea stating that she tries not to exert herself.  Therefore, I advised that she engage in a regular exercise program.  I offered her pulmonary rehab which she declined, but she plans to enroll in exercise classes through the [facility] with her daughter.  She has had positive allergy testing in the past, and complains of chronic rhinitis and sinus congestion.  This could be contributing to her cough.  I discussed this with her and advised nasal saline rinses and a steroid nasal spray.  She said she will think about using these in the future, but is not bothered enough to use them now.  Finally, she reports frequent coughing spells while swallowing suggesting recurrent aspiration.  This could also be contributing to her cough however, again she is not currently interested in further workup as she does not think this is a serious concern.  -Use Symbicort consistently for 2-3 weeks.  If symptoms do not improve, this can be discontinued.  -Engage in regular exercise  -I recommend nasal washes and a steroid nasal spray for  postnasal drip and congestion.  She declined this, but these measures could be considered in the future.  -I also recommended further evaluation of her likely frequent aspiration.  Again, she declined this, but this could be considered in the future should her problems persist      RTC: PRN    Patient staffed with Dr. Oscar Luevano M.D.  Pulmonary & Critical Care Fellow  Pager [phone]    Physician Attestation   I, Kartik Moore, saw this patient and agree with the findings and plan of care as documented in the note.      Items personally reviewed/procedural attestation: vitals, labs, imaging and agree with the interpretation documented in the note and spirometry report and agree with the interpretation documented in the note.    Kartik Moore MD      Again, thank you for allowing me to participate in the care of your patient.      Sincerely,    Conor Luevano MD

## 2018-11-22 LAB
DLCOUNC-%PRED-PRE: 104 %
DLCOUNC-PRE: 19.21 ML/MIN/MMHG
DLCOUNC-PRED: 18.35 ML/MIN/MMHG
ERV-%PRED-PRE: 36 %
ERV-PRE: 0.08 L
ERV-PRED: 0.21 L
EXPTIME-PRE: 8.03 SEC
FEF2575-%PRED-POST: 81 %
FEF2575-%PRED-PRE: 82 %
FEF2575-POST: 1.14 L/SEC
FEF2575-PRE: 1.16 L/SEC
FEF2575-PRED: 1.4 L/SEC
FEFMAX-%PRED-PRE: 120 %
FEFMAX-PRE: 5 L/SEC
FEFMAX-PRED: 4.16 L/SEC
FEV1-%PRED-PRE: 89 %
FEV1-PRE: 1.56 L
FEV1FEV6-PRE: 77 %
FEV1FEV6-PRED: 77 %
FEV1FVC-PRE: 75 %
FEV1FVC-PRED: 73 %
FEV1SVC-PRE: 68 %
FEV1SVC-PRED: 67 %
FIFMAX-PRE: 2.85 L/SEC
FRCPLETH-%PRED-PRE: 114 %
FRCPLETH-PRE: 3.03 L
FRCPLETH-PRED: 2.64 L
FVC-%PRED-PRE: 90 %
FVC-PRE: 2.09 L
FVC-PRED: 2.31 L
IC-%PRED-PRE: 93 %
IC-PRE: 2.21 L
IC-PRED: 2.37 L
RVPLETH-%PRED-PRE: 132 %
RVPLETH-PRE: 2.95 L
RVPLETH-PRED: 2.22 L
TLCPLETH-%PRED-PRE: 111 %
TLCPLETH-PRE: 5.23 L
TLCPLETH-PRED: 4.69 L
VA-%PRED-PRE: 81 %
VA-PRE: 3.91 L
VC-%PRED-PRE: 88 %
VC-PRE: 2.28 L
VC-PRED: 2.58 L

## 2018-11-26 ENCOUNTER — RADIANT APPOINTMENT (OUTPATIENT)
Dept: GENERAL RADIOLOGY | Facility: CLINIC | Age: 83
End: 2018-11-26
Attending: PHYSICIAN ASSISTANT
Payer: COMMERCIAL

## 2018-11-26 ENCOUNTER — OFFICE VISIT (OUTPATIENT)
Dept: FAMILY MEDICINE | Facility: CLINIC | Age: 83
End: 2018-11-26
Payer: COMMERCIAL

## 2018-11-26 ENCOUNTER — TELEPHONE (OUTPATIENT)
Dept: FAMILY MEDICINE | Facility: CLINIC | Age: 83
End: 2018-11-26

## 2018-11-26 VITALS
BODY MASS INDEX: 32.76 KG/M2 | TEMPERATURE: 98.1 F | OXYGEN SATURATION: 98 % | HEART RATE: 87 BPM | SYSTOLIC BLOOD PRESSURE: 130 MMHG | WEIGHT: 182 LBS | RESPIRATION RATE: 16 BRPM | DIASTOLIC BLOOD PRESSURE: 70 MMHG

## 2018-11-26 DIAGNOSIS — R07.89 CHEST WALL PAIN: Primary | ICD-10-CM

## 2018-11-26 DIAGNOSIS — M89.8X8 ILIAC CREST BONE PAIN: ICD-10-CM

## 2018-11-26 DIAGNOSIS — M81.0 AGE-RELATED OSTEOPOROSIS WITHOUT CURRENT PATHOLOGICAL FRACTURE: ICD-10-CM

## 2018-11-26 DIAGNOSIS — R07.89 CHEST WALL PAIN: ICD-10-CM

## 2018-11-26 LAB
ALBUMIN SERPL-MCNC: 3.6 G/DL (ref 3.4–5)
ANION GAP SERPL CALCULATED.3IONS-SCNC: 5 MMOL/L (ref 3–14)
BUN SERPL-MCNC: 26 MG/DL (ref 7–30)
CALCIUM SERPL-MCNC: 9.2 MG/DL (ref 8.5–10.1)
CHLORIDE SERPL-SCNC: 107 MMOL/L (ref 94–109)
CO2 SERPL-SCNC: 29 MMOL/L (ref 20–32)
CREAT SERPL-MCNC: 0.93 MG/DL (ref 0.52–1.04)
GFR SERPL CREATININE-BSD FRML MDRD: 57 ML/MIN/1.7M2
GLUCOSE SERPL-MCNC: 93 MG/DL (ref 70–99)
HGB BLD-MCNC: 12.5 G/DL (ref 11.7–15.7)
MAGNESIUM SERPL-MCNC: 2.1 MG/DL (ref 1.6–2.3)
PHOSPHATE SERPL-MCNC: 3.5 MG/DL (ref 2.5–4.5)
POTASSIUM SERPL-SCNC: 4.6 MMOL/L (ref 3.4–5.3)
SODIUM SERPL-SCNC: 141 MMOL/L (ref 133–144)

## 2018-11-26 PROCEDURE — 85018 HEMOGLOBIN: CPT | Performed by: PHYSICIAN ASSISTANT

## 2018-11-26 PROCEDURE — 71101 X-RAY EXAM UNILAT RIBS/CHEST: CPT | Mod: LT

## 2018-11-26 PROCEDURE — 99214 OFFICE O/P EST MOD 30 MIN: CPT | Performed by: PHYSICIAN ASSISTANT

## 2018-11-26 PROCEDURE — 80069 RENAL FUNCTION PANEL: CPT | Performed by: PHYSICIAN ASSISTANT

## 2018-11-26 PROCEDURE — 36415 COLL VENOUS BLD VENIPUNCTURE: CPT | Performed by: PHYSICIAN ASSISTANT

## 2018-11-26 PROCEDURE — 83735 ASSAY OF MAGNESIUM: CPT | Performed by: PHYSICIAN ASSISTANT

## 2018-11-26 PROCEDURE — 73523 X-RAY EXAM HIPS BI 5/> VIEWS: CPT | Mod: FY

## 2018-11-26 RX ORDER — PREDNISONE 20 MG/1
20 TABLET ORAL 2 TIMES DAILY
Qty: 14 TABLET | Refills: 0 | Status: SHIPPED | OUTPATIENT
Start: 2018-11-26 | End: 2019-05-01

## 2018-11-26 ASSESSMENT — ENCOUNTER SYMPTOMS
BACK PAIN: 1
CONSTITUTIONAL NEGATIVE: 1
SHORTNESS OF BREATH: 1
SPUTUM PRODUCTION: 0
CLAUDICATION: 0
DIARRHEA: 0
SINUS PAIN: 0
COUGH: 1
VOMITING: 0
LOSS OF CONSCIOUSNESS: 0
ABDOMINAL PAIN: 0
STRIDOR: 0
ORTHOPNEA: 0
HEARTBURN: 0
MYALGIAS: 1
EYES NEGATIVE: 1
PND: 0
WHEEZING: 0
BRUISES/BLEEDS EASILY: 1
NAUSEA: 0

## 2018-11-26 NOTE — TELEPHONE ENCOUNTER
Daughter is calling would like to know if provider received orders from [place] [facility] clinic of [facility]. Daughter is requesting a call back to discuss. Thank you.

## 2018-11-26 NOTE — MR AVS SNAPSHOT
After Visit Summary   11/26/2018    Rosemarie Fry    MRN: [id number]           Patient Information     Date Of Birth          11/7/1934        Visit Information        Provider Department      11/26/2018 3:00 PM Alden Conklin PA-C [facility]        Today's Diagnoses     Chest wall pain    -  1    Age-related osteoporosis without current pathological fracture        Iliac crest bone pain           Follow-ups after your visit        Who to contact     Normal or non-critical lab and imaging results will be communicated to you by MyChart, letter or phone within 4 business days after the clinic has received the results. If you do not hear from us within 7 days, please contact the clinic through [employer] or phone. If you have a critical or abnormal lab result, we will notify you by phone as soon as possible.  Submit refill requests through [employer] or call your pharmacy and they will forward the refill request to us. Please allow 3 business days for your refill to be completed.          If you need to speak with a  for additional information , please call: [phone]             Additional Information About Your Visit        Care EveryWhere ID     This is your Care EveryWhere ID. This could be used by other organizations to access your [facility] medical records  [id number]        Your Vitals Were     Pulse Temperature Respirations Pulse Oximetry BMI (Body Mass Index)       87 98.1  F (36.7  C) (Tympanic) 16 98% 32.76 kg/m2        Blood Pressure from Last 3 Encounters:   11/26/18 130/70   11/21/18 162/74   08/27/18 140/80    Weight from Last 3 Encounters:   11/26/18 182 lb (82.6 kg)   08/27/18 181 lb (82.1 kg)   08/16/18 181 lb (82.1 kg)                 Today's Medication Changes          These changes are accurate as of 11/26/18  3:39 PM.  If you have any questions, ask your nurse or doctor.               Start taking these medicines.        Dose/Directions    predniSONE  20 MG tablet   Commonly known as:  DELTASONE   Used for:  Chest wall pain   Started by:  Alden Conklin PA-C        Dose:  20 mg   Take 1 tablet (20 mg) by mouth 2 times daily for 7 days   Quantity:  14 tablet   Refills:  0         Stop taking these medicines if you haven't already. Please contact your care team if you have questions.     budesonide-formoterol 80-4.5 MCG/ACT Inhaler   Commonly known as:  SYMBICORT   Stopped by:  Alden Conklin PA-C                Where to get your medicines      These medications were sent to [facility] - [place], [place] - [address] [facility]  [address] , [place] [address]    Hours:  24-hours Phone:  [phone]     predniSONE 20 MG tablet                Primary Care Provider Office Phone # Fax #    Alden Conklin PA-C [phone] [address]  [place] [address]        Equal Access to Services     [facility] AH: Hadii aad ku hadasho Soomaali, waaxda luqadaha, qaybta kaalmada adeegyada, waxay idiin hayaan donavan baker . So [facility] [phone].    ATENCIÓN: Si habla español, tiene a liu disposición servicios gratuitos de asistencia lingüística. [facility] [phone].    We comply with applicable federal civil rights laws and [place] laws. We do not discriminate on the basis of race, color, national origin, age, disability, sex, sexual orientation, or gender identity.            Thank you!     Thank you for choosing [facility]  for your care. Our goal is always to provide you with excellent care. Hearing back from our patients is one way we can continue to improve our services. Please take a few minutes to complete the written survey that you may receive in the mail after your visit with us. Thank you!             Your Updated Medication List - Protect others around you: Learn how to safely use, store and throw away your medicines at www.disposemymeds.org.           This list is accurate as of 11/26/18  3:39 PM.  Always use your most recent med list.                   Brand Name Dispense Instructions for use Diagnosis    albuterol 108 (90 Base) MCG/ACT inhaler    PROAIR HFA/PROVENTIL HFA/VENTOLIN HFA    1 Inhaler    Inhale 2 puffs into the lungs every 6 hours as needed for shortness of breath / dyspnea or wheezing    Mixed simple and mucopurulent chronic bronchitis (H)       gabapentin 100 MG capsule    NEURONTIN    270 capsule    Week 1: 100 mg in the evening. Week 2: 100 mg times a day. Week 3 and afterwards : 100 mg three times a day PATIENT SAYS SHE WILL TAKE MEDICATION WHEN NEEDED BECAUSE SHE IS LEARY ABOUT THIS MEDICATION.    Analgesic rebound headache, Chronic tension-type headache, not intractable       metoprolol tartrate 25 MG tablet    LOPRESSOR     Take 25 mg by mouth 2 times daily        nitroGLYcerin 0.4 MG sublingual tablet    NITROSTAT    25 tablet    Place 1 tablet (0.4 mg) under the tongue every 5 minutes as needed for chest pain If you are still having symptoms after 3 doses (15 minutes) call 911.    Atypical chest pain       predniSONE 20 MG tablet    DELTASONE    14 tablet    Take 1 tablet (20 mg) by mouth 2 times daily for 7 days    Chest wall pain       ranitidine 300 MG tablet    ZANTAC    90 tablet    Take 1 tablet (300 mg) by mouth At Bedtime    Gastroesophageal reflux disease without esophagitis       XARELTO 20 MG Tabs tablet   Generic drug:  rivaroxaban ANTICOAGULANT      Take 1 tablet (20 mg) by mouth daily (with dinner)    Chronic atrial fibrillation (H)

## 2018-11-26 NOTE — TELEPHONE ENCOUNTER
"Spoke with EC daughter. Pt needing lab work for her to receive Reclast. Daughter unsure of \"what labs were needed, except she was \"sure that patient needed Kidney function test\". Endocrinologist was \"supposed to send over orders\" so patient could have lab work done prior to appt with Alden Conklin.  Daughter is calling Endocrinologist to make sure lab orders are sent to PCP.    Routing to PCP to address.    Beverley Gutiérrez, RN, BSN    "

## 2018-11-26 NOTE — PROGRESS NOTES
SUBJECTIVE:   Rosemarie Fry is a 84 year old female who presents to clinic today for the following health issues:      Abdominal Pain      Duration: 1 week    Description (location/character/radiation): LUQ       Associated flank pain: left flank pain    Intensity:  moderate    Accompanying signs and symptoms:        Fever/Chills: no        Gas/Bloating: no        Nausea/vomitting: no        Diarrhea: no        Dysuria or Hematuria: no     History (previous similar pain/trauma/previous testing):     Precipitating or alleviating factors:       Pain worse with eating/BM/urination: no       Pain relieved by BM: no     Therapies tried and outcome: None    LMP:  not applicable    Leaned over a chair two weeks ago. Still noting some pain.   No profound sob. Pain with movement. No rash. No irritative/obst voiding. No n/v/d/c.   Some right iliac crest pain with exercise.   Problem list and histories reviewed & adjusted, as indicated.  Additional history: as documented    BP Readings from Last 3 Encounters:   11/26/18 130/70   11/21/18 162/74   08/27/18 140/80    Wt Readings from Last 3 Encounters:   11/26/18 182 lb (82.6 kg)   08/27/18 181 lb (82.1 kg)   08/16/18 181 lb (82.1 kg)                    Reviewed and updated as needed this visit by clinical staff  Tobacco  Allergies  Meds       Reviewed and updated as needed this visit by Provider         All other systems negative except as outline above  OBJECTIVE:    Eye exam - right eye normal lid, conjunctiva, cornea, pupil and fundus, left eye normal lid, conjunctiva, cornea, pupil and fundus.  Thyroid not palpable, not enlarged, no nodules detected.  CHEST:chest clear to IPPA, no tachypnea, retractions or cyanosis and S1, S2 normal, no murmur, no gallop, rate regular.  Tenderness with palpation of her right iliac crest.   Left costochondral pain involving her left anterolateral chest wall.   No edema.    Rosemarie was seen today for flank pain.    Diagnoses and all  orders for this visit:    Chest wall pain  -     XR Ribs & Chest Left G/E 3 Views; Future  -     predniSONE (DELTASONE) 20 MG tablet; Take 1 tablet (20 mg) by mouth 2 times daily for 7 days    Age-related osteoporosis without current pathological fracture  -     Renal panel (Alb, BUN, Ca, Cl, CO2, Creat, Gluc, Phos, K, Na); Future  -     Magnesium; Future  -     **Hemoglobin FUTURE anytime; Future    Iliac crest bone pain  -     XR Pelvis and Hip Bilateral 2 Views; Future      Advised supportive and symptomatic treatment.  Follow up with Provider - if condition persists or worsens.

## 2018-11-26 NOTE — TELEPHONE ENCOUNTER
Called daughter back to inform that orders are placed. Advised lab appt at 2:45 pm and appt with Alden at 3 pm.  Advised to call clinic if she has any questions or concerns. [phone]    Beverley Gutiérrez RN, BSN

## 2018-11-27 ENCOUNTER — TRANSFERRED RECORDS (OUTPATIENT)
Dept: HEALTH INFORMATION MANAGEMENT | Facility: CLINIC | Age: 83
End: 2018-11-27

## 2018-12-11 DIAGNOSIS — E78.5 HYPERLIPEMIA: Primary | ICD-10-CM

## 2018-12-11 LAB
CHOLEST SERPL-MCNC: 220 MG/DL
HDLC SERPL-MCNC: 44 MG/DL
LDLC SERPL CALC-MCNC: 132 MG/DL
NONHDLC SERPL-MCNC: 176 MG/DL
TRIGL SERPL-MCNC: 218 MG/DL

## 2018-12-11 PROCEDURE — 80061 LIPID PANEL: CPT | Performed by: PHYSICIAN ASSISTANT

## 2018-12-11 PROCEDURE — 36415 COLL VENOUS BLD VENIPUNCTURE: CPT | Performed by: PHYSICIAN ASSISTANT

## 2019-01-14 ENCOUNTER — TELEPHONE (OUTPATIENT)
Dept: FAMILY MEDICINE | Facility: CLINIC | Age: 84
End: 2019-01-14

## 2019-01-14 ENCOUNTER — TRANSFERRED RECORDS (OUTPATIENT)
Dept: HEALTH INFORMATION MANAGEMENT | Facility: CLINIC | Age: 84
End: 2019-01-14

## 2019-01-14 DIAGNOSIS — T39.95XA ANALGESIC REBOUND HEADACHE: ICD-10-CM

## 2019-01-14 DIAGNOSIS — G44.229 CHRONIC TENSION-TYPE HEADACHE, NOT INTRACTABLE: ICD-10-CM

## 2019-01-14 DIAGNOSIS — G44.40 ANALGESIC REBOUND HEADACHE: ICD-10-CM

## 2019-01-14 NOTE — TELEPHONE ENCOUNTER
Routing refill request to provider for review/approval because:  Drug not on the [facility] refill protocol     Mary Perkins RN, BSN, PHN

## 2019-01-15 RX ORDER — GABAPENTIN 100 MG/1
CAPSULE ORAL
Qty: 270 CAPSULE | Refills: 0 | Status: SHIPPED | OUTPATIENT
Start: 2019-01-15 | End: 2019-04-16

## 2019-01-17 ENCOUNTER — OFFICE VISIT (OUTPATIENT)
Dept: FAMILY MEDICINE | Facility: CLINIC | Age: 84
End: 2019-01-17
Payer: COMMERCIAL

## 2019-01-17 VITALS
DIASTOLIC BLOOD PRESSURE: 72 MMHG | OXYGEN SATURATION: 96 % | SYSTOLIC BLOOD PRESSURE: 120 MMHG | BODY MASS INDEX: 32.25 KG/M2 | WEIGHT: 182 LBS | HEIGHT: 63 IN | RESPIRATION RATE: 16 BRPM | TEMPERATURE: 97.9 F | HEART RATE: 96 BPM

## 2019-01-17 DIAGNOSIS — N64.4 PAIN OF RIGHT BREAST: ICD-10-CM

## 2019-01-17 DIAGNOSIS — J01.00 ACUTE NON-RECURRENT MAXILLARY SINUSITIS: ICD-10-CM

## 2019-01-17 DIAGNOSIS — N64.4 BREAST TENDERNESS IN FEMALE: ICD-10-CM

## 2019-01-17 DIAGNOSIS — R07.89 CHEST WALL PAIN: Primary | ICD-10-CM

## 2019-01-17 PROCEDURE — 99213 OFFICE O/P EST LOW 20 MIN: CPT | Performed by: PHYSICIAN ASSISTANT

## 2019-01-17 RX ORDER — PREDNISONE 20 MG/1
20 TABLET ORAL 2 TIMES DAILY
Qty: 14 TABLET | Refills: 0 | Status: SHIPPED | OUTPATIENT
Start: 2019-01-17 | End: 2019-05-01

## 2019-01-17 RX ORDER — AMOXICILLIN 875 MG
875 TABLET ORAL 2 TIMES DAILY
Qty: 20 TABLET | Refills: 0 | Status: SHIPPED | OUTPATIENT
Start: 2019-01-17 | End: 2019-05-01

## 2019-01-17 ASSESSMENT — MIFFLIN-ST. JEOR: SCORE: 1236.74

## 2019-01-17 NOTE — PROGRESS NOTES
SUBJECTIVE:   Rosemarie Fry is a 84 year old female who presents to clinic today for the following health issues:      Breast pain right      Duration: 1 month    Description (location/character/radiation): right breast pain no lump noted    Intensity:  moderate    Accompanying signs and symptoms: waxing and waning    History (similar episodes/previous evaluation): None    Precipitating or alleviating factors: None    Therapies tried and outcome: None       No fevers. No trauma. No nipple discharge. No redness or lumps. No rash. No paresthesias.  Last mammogram in 11/17 revealed no concerning findings.   Problem list and histories reviewed & adjusted, as indicated.  Additional history: as documented    BP Readings from Last 3 Encounters:   01/17/19 120/72   11/26/18 130/70   11/21/18 162/74    Wt Readings from Last 3 Encounters:   01/17/19 82.6 kg (182 lb)   11/26/18 82.6 kg (182 lb)   08/27/18 82.1 kg (181 lb)                    Reviewed and updated as needed this visit by clinical staff  Tobacco  Allergies  Meds       Reviewed and updated as needed this visit by Provider         All other systems negative except as outline above  OBJECTIVE:  Eye exam - right eye normal lid, conjunctiva, cornea, pupil and fundus, left eye normal lid, conjunctiva, cornea, pupil and fundus.  Thyroid not palpable, not enlarged, no nodules detected.  CHEST:chest clear to IPPA, no tachypnea, retractions or cyanosis and S1, S2 normal, no murmur, no gallop, rate regular.  Pain with palpation of her 5th and 6th costochondral junction. No mass. Breast exam normal.     Rosemarie was seen today for breast pain.    Diagnoses and all orders for this visit:    Chest wall pain  -     predniSONE (DELTASONE) 20 MG tablet; Take 20 mg by mouth 2 times daily for 7 days.    Pain of right breast  -     MA Diagnostic Digital Bilateral; Future  -     US Breast Right Complete 4 Quadrants; Future    Breast tenderness in female      Advised supportive and  symptomatic treatment.  Follow up with Provider - if condition persists or worsens.

## 2019-01-24 ENCOUNTER — ANCILLARY PROCEDURE (OUTPATIENT)
Dept: MAMMOGRAPHY | Facility: CLINIC | Age: 84
End: 2019-01-24
Payer: COMMERCIAL

## 2019-01-24 ENCOUNTER — ANCILLARY PROCEDURE (OUTPATIENT)
Dept: ULTRASOUND IMAGING | Facility: CLINIC | Age: 84
End: 2019-01-24
Payer: COMMERCIAL

## 2019-01-24 DIAGNOSIS — N64.4 PAIN OF RIGHT BREAST: ICD-10-CM

## 2019-01-24 PROCEDURE — 77066 DX MAMMO INCL CAD BI: CPT | Performed by: RADIOLOGY

## 2019-01-24 PROCEDURE — G0279 TOMOSYNTHESIS, MAMMO: HCPCS | Performed by: RADIOLOGY

## 2019-01-24 PROCEDURE — 76642 ULTRASOUND BREAST LIMITED: CPT | Mod: RT | Performed by: RADIOLOGY

## 2019-04-10 ASSESSMENT — MIFFLIN-ST. JEOR
SCORE: 1184.78
SCORE: 1184.78

## 2019-04-11 ENCOUNTER — HOSPITAL ENCOUNTER (OUTPATIENT)
Dept: SURGERY | Facility: AMBULATORY SURGERY CENTER | Age: 84
Discharge: HOME | End: 2019-04-11
Attending: PHYSICAL MEDICINE & REHABILITATION | Admitting: PHYSICAL MEDICINE & REHABILITATION
Payer: COMMERCIAL

## 2019-04-11 ENCOUNTER — SURGERY - HEALTHEAST (OUTPATIENT)
Dept: SURGERY | Facility: AMBULATORY SURGERY CENTER | Age: 84
End: 2019-04-11

## 2019-04-11 ASSESSMENT — MIFFLIN-ST. JEOR
SCORE: 1184.78
SCORE: 1184.78

## 2019-04-15 DIAGNOSIS — G44.229 CHRONIC TENSION-TYPE HEADACHE, NOT INTRACTABLE: ICD-10-CM

## 2019-04-15 DIAGNOSIS — T39.95XA ANALGESIC REBOUND HEADACHE: ICD-10-CM

## 2019-04-15 DIAGNOSIS — G44.40 ANALGESIC REBOUND HEADACHE: ICD-10-CM

## 2019-04-15 NOTE — TELEPHONE ENCOUNTER
Gabapentin      Last Written Prescription Date:  01/15/19  Last Fill Quantity: 270,   # refills: 0  Last Office Visit: 01/17/19  ARMANDO Conklin  Future Office visit:       Routing refill request to provider for review/approval because:  Drug not on the [facility], P or [facility] refill protocol or controlled substance

## 2019-04-16 RX ORDER — GABAPENTIN 100 MG/1
CAPSULE ORAL
Qty: 270 CAPSULE | Refills: 0 | Status: SHIPPED | OUTPATIENT
Start: 2019-04-16 | End: 2020-10-28

## 2019-04-25 ENCOUNTER — THERAPY VISIT (OUTPATIENT)
Dept: PHYSICAL THERAPY | Facility: CLINIC | Age: 84
End: 2019-04-25
Payer: COMMERCIAL

## 2019-04-25 ENCOUNTER — TELEPHONE (OUTPATIENT)
Dept: FAMILY MEDICINE | Facility: CLINIC | Age: 84
End: 2019-04-25

## 2019-04-25 DIAGNOSIS — M54.50 CHRONIC BILATERAL LOW BACK PAIN WITHOUT SCIATICA: ICD-10-CM

## 2019-04-25 DIAGNOSIS — G89.29 CHRONIC BILATERAL LOW BACK PAIN WITHOUT SCIATICA: ICD-10-CM

## 2019-04-25 PROCEDURE — 97110 THERAPEUTIC EXERCISES: CPT | Mod: GP | Performed by: PHYSICAL THERAPIST

## 2019-04-25 PROCEDURE — 97161 PT EVAL LOW COMPLEX 20 MIN: CPT | Mod: GP | Performed by: PHYSICAL THERAPIST
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
## 2019-04-25 NOTE — TELEPHONE ENCOUNTER
"Spoke with patient's daughter Janis.    Patient went in to Pulmonology 11/21/18 and they \"said there was nothing wrong\".    Noting OV note from Pulmonology stating:    -Use Symbicort consistently for 2-3 weeks.  If symptoms do not improve, this can be discontinued.  -Engage in regular exercise  -I recommend nasal washes and a steroid nasal spray for postnasal drip and congestion.  She declined this, but these measures could be considered in the future.  -I also recommended further evaluation of her likely frequent aspiration.  Again, she declined this, but this could be considered in the future should her problems persist    Daughter stating they did not have her evaluated for aspiration/swallow. Patient did use Symbicort for 2-3 weeks consistently and did NOT notice any improvement.    Patient can hardly walk without having to stop as she has moderate dyspnea on exertion.    Daughter stating they are going to try a sleep center eval and a different pulmonologist, [facility] and cannot get in \"for a while\". Wanting Alden to prescribe an inhaler to try until they are able to be seen by new pulmonologist.    Daughter wondering if Alden can prescribe a different preventative inhaler for patient or if she needs to go back to pulmonologist?    Please advise.    Beverley Gutiérrez, RN, BSN           "

## 2019-04-25 NOTE — TELEPHONE ENCOUNTER
Called patient's daughter Janis, no answer. Left message on voice mail for patient to call clinic. [phone]    Beverley Gutiérrez RN, BSN
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
## 2019-04-25 NOTE — TELEPHONE ENCOUNTER
Daughter is calling in regards to patients breathing/bronchitis, please call to discuss. Thank you.

## 2019-05-01 ENCOUNTER — OFFICE VISIT (OUTPATIENT)
Dept: FAMILY MEDICINE | Facility: CLINIC | Age: 84
End: 2019-05-01
Payer: COMMERCIAL

## 2019-05-01 VITALS
TEMPERATURE: 98.5 F | HEART RATE: 81 BPM | BODY MASS INDEX: 32.4 KG/M2 | RESPIRATION RATE: 19 BRPM | WEIGHT: 180 LBS | SYSTOLIC BLOOD PRESSURE: 134 MMHG | DIASTOLIC BLOOD PRESSURE: 77 MMHG | OXYGEN SATURATION: 97 %

## 2019-05-01 DIAGNOSIS — R23.8 SKIN IRRITATION: ICD-10-CM

## 2019-05-01 DIAGNOSIS — K21.9 GASTROESOPHAGEAL REFLUX DISEASE WITHOUT ESOPHAGITIS: ICD-10-CM

## 2019-05-01 DIAGNOSIS — R06.02 SOB (SHORTNESS OF BREATH): Primary | ICD-10-CM

## 2019-05-01 DIAGNOSIS — E06.3 HYPOTHYROIDISM DUE TO HASHIMOTO'S THYROIDITIS: ICD-10-CM

## 2019-05-01 DIAGNOSIS — J41.8 MIXED SIMPLE AND MUCOPURULENT CHRONIC BRONCHITIS (H): ICD-10-CM

## 2019-05-01 DIAGNOSIS — I48.20 CHRONIC ATRIAL FIBRILLATION (H): ICD-10-CM

## 2019-05-01 LAB
BASOPHILS # BLD AUTO: 0 10E9/L (ref 0–0.2)
BASOPHILS NFR BLD AUTO: 0.5 %
DIFFERENTIAL METHOD BLD: ABNORMAL
EOSINOPHIL # BLD AUTO: 0.2 10E9/L (ref 0–0.7)
EOSINOPHIL NFR BLD AUTO: 2.6 %
ERYTHROCYTE [DISTWIDTH] IN BLOOD BY AUTOMATED COUNT: 13.6 % (ref 10–15)
HCT VFR BLD AUTO: 39.4 % (ref 35–47)
HGB BLD-MCNC: 12.7 G/DL (ref 11.7–15.7)
LYMPHOCYTES # BLD AUTO: 2 10E9/L (ref 0.8–5.3)
LYMPHOCYTES NFR BLD AUTO: 31 %
MCH RBC QN AUTO: 32.6 PG (ref 26.5–33)
MCHC RBC AUTO-ENTMCNC: 32.2 G/DL (ref 31.5–36.5)
MCV RBC AUTO: 101 FL (ref 78–100)
MONOCYTES # BLD AUTO: 0.7 10E9/L (ref 0–1.3)
MONOCYTES NFR BLD AUTO: 11.2 %
NEUTROPHILS # BLD AUTO: 3.5 10E9/L (ref 1.6–8.3)
NEUTROPHILS NFR BLD AUTO: 54.7 %
NT-PROBNP SERPL-MCNC: 86 PG/ML (ref 0–450)
PLATELET # BLD AUTO: 249 10E9/L (ref 150–450)
RBC # BLD AUTO: 3.89 10E12/L (ref 3.8–5.2)
T4 FREE SERPL-MCNC: 0.79 NG/DL (ref 0.76–1.46)
TSH SERPL DL<=0.005 MIU/L-ACNC: 5.7 MU/L (ref 0.4–4)
WBC # BLD AUTO: 6.4 10E9/L (ref 4–11)

## 2019-05-01 PROCEDURE — 84439 ASSAY OF FREE THYROXINE: CPT | Performed by: PHYSICIAN ASSISTANT

## 2019-05-01 PROCEDURE — 83880 ASSAY OF NATRIURETIC PEPTIDE: CPT | Performed by: PHYSICIAN ASSISTANT

## 2019-05-01 PROCEDURE — 85025 COMPLETE CBC W/AUTO DIFF WBC: CPT | Performed by: PHYSICIAN ASSISTANT

## 2019-05-01 PROCEDURE — 36415 COLL VENOUS BLD VENIPUNCTURE: CPT | Performed by: PHYSICIAN ASSISTANT

## 2019-05-01 PROCEDURE — 99214 OFFICE O/P EST MOD 30 MIN: CPT | Performed by: PHYSICIAN ASSISTANT

## 2019-05-01 PROCEDURE — 84443 ASSAY THYROID STIM HORMONE: CPT | Performed by: PHYSICIAN ASSISTANT

## 2019-05-01 RX ORDER — TRIAMCINOLONE ACETONIDE 1 MG/G
OINTMENT TOPICAL 2 TIMES DAILY
Qty: 30 G | Refills: 1 | Status: SHIPPED | OUTPATIENT
Start: 2019-05-01 | End: 2021-06-03

## 2019-05-01 NOTE — PROGRESS NOTES
SUBJECTIVE:   Rosemarie Fry is a 84 year old female who presents to clinic today for the following   health issues:        Acute Illness   Acute illness concerns: follow up congestion and shortness of breath with indigestion. Only using albuterol with no relief. Was told to do nasal rinse patient does not want to do this states does not help. Wanting referral to another pulmonologist. Patient notes wheezing. Chronic rhinitis   Onset: chronic    Fever: no    Chills/Sweats: no    Headache (location?): no    Sinus Pressure:YES    Conjunctivitis:  no    Ear Pain: no    Rhinorrhea: YES    Congestion: YES    Sore Throat: no     Cough: YES-productive of clear sputum    Wheeze: YES    Decreased Appetite: no    Nausea: no    Vomiting: no    Diarrhea:  no    Dysuria/Freq.: no    Fatigue/Achiness: YES    Sick/Strep Exposure: no     Therapies Tried and outcome: albuterol      No leg swelling. History of afib. Exercising.   Still noting sob.     Additional history: as documented    Reviewed  and updated as needed this visit by clinical staff         Reviewed and updated as needed this visit by Provider         BP Readings from Last 3 Encounters:   01/17/19 120/72   11/26/18 130/70   11/21/18 162/74    Wt Readings from Last 3 Encounters:   01/17/19 82.6 kg (182 lb)   11/26/18 82.6 kg (182 lb)   08/27/18 82.1 kg (181 lb)                    All other systems negative except as outline above  OBJECTIVE:  ENT exam reveals - ENT exam normal, no neck nodes or sinus tenderness.  CHEST:chest clear to IPPA, no tachypnea, retractions or cyanosis and S1, S2 normal, no murmur, no gallop, rate regular.  Area of dry irritated skin at medial canthal fold of right eye.   No edema     Rosemarie was seen today for shortness of breath.    Diagnoses and all orders for this visit:    SOB (shortness of breath)  -     PULMONARY MEDICINE REFERRAL  -     CBC with platelets differential  -     BNP-N terminal pro  -     TSH with free T4  reflex    Gastroesophageal reflux disease without esophagitis  -     ranitidine (ZANTAC) 300 MG tablet; Take 1 tablet (300 mg) by mouth At Bedtime    Chronic atrial fibrillation (H)    Mixed simple and mucopurulent chronic bronchitis (H)  -     PULMONARY MEDICINE REFERRAL    Skin irritation  -     triamcinolone (KENALOG) 0.1 % external ointment; Apply topically 2 times daily      work on lifestyle modification  Recheck in 6 mos

## 2019-05-01 NOTE — LETTER
May 3, 2019      Rosemarie Fry  [address] [address]        Dear ,    We are writing to inform you of your test results.    Your thyroid function came back suggesting the need to start a medication to replenish your thyroid hormone. I am starting you on a low dose of levothyroxine. Take this for 6 wks , then return to clinic for a recheck of your thyroid function.    Resulted Orders   CBC with platelets differential   Result Value Ref Range    WBC 6.4 4.0 - 11.0 10e9/L    RBC Count 3.89 3.8 - 5.2 10e12/L    Hemoglobin 12.7 11.7 - 15.7 g/dL    Hematocrit 39.4 35.0 - 47.0 %     (H) 78 - 100 fl    MCH 32.6 26.5 - 33.0 pg    MCHC 32.2 31.5 - 36.5 g/dL    RDW 13.6 10.0 - 15.0 %    Platelet Count 249 150 - 450 10e9/L    % Neutrophils 54.7 %    % Lymphocytes 31.0 %    % Monocytes 11.2 %    % Eosinophils 2.6 %    % Basophils 0.5 %    Absolute Neutrophil 3.5 1.6 - 8.3 10e9/L    Absolute Lymphocytes 2.0 0.8 - 5.3 10e9/L    Absolute Monocytes 0.7 0.0 - 1.3 10e9/L    Absolute Eosinophils 0.2 0.0 - 0.7 10e9/L    Absolute Basophils 0.0 0.0 - 0.2 10e9/L    Diff Method Automated Method    BNP-N terminal pro   Result Value Ref Range    N-Terminal Pro Bnp 86 0 - 450 pg/mL      Comment:         Reference range shown and results flagged as abnormal are for the outpatient,   non acute settings. Establishing a baseline value for each individual patient   is useful for follow-up.  Suggested inpatient cut points for confirming diagnosis of CHF in an acute   setting are:   >450 pg/mL (age 18 to less than 50)   >900 pg/mL (age 50 to less than 75)   >1800 pg/mL (75 yrs and older)  An inpatient or emergency department NT-proPBNP <300 pg/mL effectively rules   out acute CHF, with 99% negative predictive value.      TSH with free T4 reflex   Result Value Ref Range    TSH 5.70 (H) 0.40 - 4.00 mU/L   T4 free   Result Value Ref Range    T4 Free 0.79 0.76 - 1.46 ng/dL       If you have any questions or  concerns, please call the clinic at the number listed above.       Sincerely,        Alden Conklin PA-C/mp

## 2019-05-02 RX ORDER — LEVOTHYROXINE SODIUM 25 UG/1
25 TABLET ORAL DAILY
Qty: 60 TABLET | Refills: 1 | Status: SHIPPED | OUTPATIENT
Start: 2019-05-02 | End: 2019-07-28

## 2019-05-03 ENCOUNTER — THERAPY VISIT (OUTPATIENT)
Dept: PHYSICAL THERAPY | Facility: CLINIC | Age: 84
End: 2019-05-03
Payer: COMMERCIAL

## 2019-05-03 ENCOUNTER — TRANSFERRED RECORDS (OUTPATIENT)
Dept: HEALTH INFORMATION MANAGEMENT | Facility: CLINIC | Age: 84
End: 2019-05-03

## 2019-05-03 DIAGNOSIS — M54.50 CHRONIC BILATERAL LOW BACK PAIN WITHOUT SCIATICA: ICD-10-CM

## 2019-05-03 DIAGNOSIS — G89.29 CHRONIC BILATERAL LOW BACK PAIN WITHOUT SCIATICA: ICD-10-CM

## 2019-05-03 PROCEDURE — 97110 THERAPEUTIC EXERCISES: CPT | Mod: GP | Performed by: PHYSICAL THERAPIST

## 2019-05-15 ENCOUNTER — THERAPY VISIT (OUTPATIENT)
Dept: PHYSICAL THERAPY | Facility: CLINIC | Age: 84
End: 2019-05-15
Payer: COMMERCIAL

## 2019-05-15 DIAGNOSIS — M54.50 CHRONIC BILATERAL LOW BACK PAIN WITHOUT SCIATICA: ICD-10-CM

## 2019-05-15 DIAGNOSIS — G89.29 CHRONIC BILATERAL LOW BACK PAIN WITHOUT SCIATICA: ICD-10-CM

## 2019-05-15 PROCEDURE — 97110 THERAPEUTIC EXERCISES: CPT | Mod: GP | Performed by: PHYSICAL THERAPIST

## 2019-05-15 NOTE — PROGRESS NOTES
"Subjective:  HPI  Oswestry Score: 0 %                 Objective:  System    Physical Exam    General     ROS    Assessment/Plan:    DISCHARGE REPORT    Progress reporting period is from 04/25/2019 to 05/15/2019.       SUBJECTIVE  Subjective: \"When I walk, I don't feel pain.\"  Also doing well with vacuuming.  \"If it stays this way, I would be happy.  Exercise matters and I feel a lot better.\"  Limited more by stamina than any pain.    Current Pain level: 0/10.     Initial Pain level: 2/10.   Changes in function:  Yes (See Goal flowsheet attached for changes in current functional level)  Adverse reaction to treatment or activity: None    OBJECTIVE  Objective: No discomfort standing lumbar AROM all directions.  No tenderness to palpation.     ASSESSMENT/PLAN  Updated problem list and treatment plan: Diagnosis 1:  LBP -- see plan below  STG/LTGs have been met or progress has been made towards goals:  Yes (See Goal flow sheet completed today.)  Assessment of Progress: The patient's condition is improving.  Self Management Plans:  Patient has been instructed in a home treatment program.  I have re-evaluated this patient and find that the nature, scope, duration and intensity of the therapy is appropriate for the medical condition of the patient.  Rosemarie continues to require the following intervention to meet STG and LTG's:  PT intervention is no longer required to meet STG/LTG.    Recommendations:  Pt doing quite well symptomatically and functionally at this point.  She indicates would like to cancel remaining PT and also does not plan to pursue further injections if she continues in this manner.  Pt to let me know if progress is not sustained and we will re-evaluate at that point.  Otherwise, discharge to [facility].    Please refer to the daily flowsheet for treatment today, total treatment time and time spent performing 1:1 timed codes.          "

## 2019-05-15 NOTE — LETTER
"[place] [facility]  [address]  [address]  [place] [address]  [phone]    2019    Re: Rosemarie Fry   :   1934  MRN:  [id number]   REFERRING PHYSICIAN:   Daniel Peter Sipple    [place] [facility]    Date of Initial Evaluation:  2019  Visits:  Rxs Used: 3  Reason for Referral:  Chronic bilateral low back pain without sciatica    DISCHARGE REPORT  Progress reporting period is from 2019 to 05/15/2019.       SUBJECTIVE  Subjective: \"When I walk, I don't feel pain.\"  Also doing well with vacuuming.  \"If it stays this way, I would be happy.  Exercise matters and I feel a lot better.\"  Limited more by stamina than any pain.    Current Pain level: 0/10.     Initial Pain level: 2/10.   Changes in function:  Yes (See Goal flowsheet attached for changes in current functional level)  Adverse reaction to treatment or activity: None    OBJECTIVE  Objective: No discomfort standing lumbar AROM all directions.  No tenderness to palpation.     ASSESSMENT/PLAN  Updated problem list and treatment plan: Diagnosis 1:  LBP -- see plan below  STG/LTGs have been met or progress has been made towards goals:  Yes (See Goal flow sheet completed today.)  Assessment of Progress: The patient's condition is improving.  Self Management Plans:  Patient has been instructed in a home treatment program.  I have re-evaluated this patient and find that the nature, scope, duration and intensity of the therapy is appropriate for the medical condition of the patient.  Rosemarie continues to require the following intervention to meet STG and LTG's:  PT intervention is no longer required to meet STG/LTG.    Recommendations:  Pt doing quite well symptomatically and functionally at this point.  She indicates would like to cancel remaining PT and also does not plan to pursue further injections if she continues in this manner.  Pt to let me know if progress is not " sustained and we will re-evaluate at that point.  Otherwise, discharge to [facility].        Thank you for your referral.    INQUIRIES  Therapist: Chavo Bran PT, ATC, [facility]  [facility] [place]  [address]  [address]  [place] [address]  Phone: [phone]  Fax: [phone]

## 2019-05-15 NOTE — LETTER
"[place] [facility]  [address]  [address]  [place] [address]  [phone]    May 16, 2019    Re: Rosemarie Fry   :   1934  MRN:  [id number]   REFERRING PHYSICIAN:   Daniel Peter Sipple    [place] [facility]    Date of Initial Evaluation: 19  Visits:  Rxs Used: 3  Reason for Referral:  Chronic bilateral low back pain without sciatica    DISCHARGE REPORT    Progress reporting period is from 2019 to 05/15/2019.       SUBJECTIVE  Subjective: \"When I walk, I don't feel pain.\"  Also doing well with vacuuming.  \"If it stays this way, I would be happy.  Exercise matters and I feel a lot better.\"  Limited more by stamina than any pain.    Current Pain level: 0/10.     Initial Pain level: 2/10.   Changes in function:  Yes (See Goal flowsheet attached for changes in current functional level)  Adverse reaction to treatment or activity: None    OBJECTIVE  Objective: No discomfort standing lumbar AROM all directions.  No tenderness to palpation.     ASSESSMENT/PLAN  Updated problem list and treatment plan: Diagnosis 1:  LBP -- see plan below  STG/LTGs have been met or progress has been made towards goals:  Yes (See Goal flow sheet completed today.)  Assessment of Progress: The patient's condition is improving.  Self Management Plans:  Patient has been instructed in a home treatment program.  I have re-evaluated this patient and find that the nature, scope, duration and intensity of the therapy is appropriate for the medical condition of the patient.  Rosemarie continues to require the following intervention to meet STG and LTG's:  PT intervention is no longer required to meet STG/LTG.    Recommendations:  Pt doing quite well symptomatically and functionally at this point.  She indicates would like to cancel remaining PT and also does not plan to pursue further injections if she continues in this manner.  Pt to let me know if progress is not " sustained and we will re-evaluate at that point.  Otherwise, discharge to [facility].        Thank you for your referral.    INQUIRIES  Therapist: Chavo Bran PT, ATC, [facility]   [facility] [place]  [address]  [place] [address]  Phone: [phone]  Fax: [phone]

## 2019-07-17 ENCOUNTER — TELEPHONE (OUTPATIENT)
Dept: FAMILY MEDICINE | Facility: CLINIC | Age: 84
End: 2019-07-17

## 2019-07-17 DIAGNOSIS — J01.00 ACUTE NON-RECURRENT MAXILLARY SINUSITIS: ICD-10-CM

## 2019-07-17 NOTE — TELEPHONE ENCOUNTER
"I spoke with patient's daughter. Because there are no clinic appointments available here today, she is instructed to complete an \"Oncare\" visit for her mother. Yesi in agreement and verbalized a good understanding.   "

## 2019-07-17 NOTE — TELEPHONE ENCOUNTER
"Requested Prescriptions   Pending Prescriptions Disp Refills     amoxicillin (AMOXIL) 875 MG tablet [Pharmacy Med Name: AMOXICILLIN 875MG TABLETS] 20 tablet 0     Sig: TAKE 1 TABLET(875 MG) BY MOUTH TWICE DAILY FOR 10 DAYS  Last Written Prescription Date:  1/17/19  Last Fill Quantity: 20,  # refills: 0   Last office visit: 5/1/2019 with prescribing provider:  JOSE Conklin   Future Office Visit:   Next 5 appointments (look out 90 days)    Jul 25, 2019  1:40 PM CDT  Office Visit with Alden Conklin PA-C  [facility] [address] [address] [address]  [phone]              Oral Acne/Rosacea Medications Protocol Failed - 7/17/2019  3:47 PM        Failed - Confirmation of diagnosis is required     Please confirm diagnosis is acne or rosacea.     If NOT acne or rosacea; refer request to provider for further evaluation.    If diagnosis IS acne or rosacea, OK to refill BASED ON PREVIOUS REFILL CLINICAL NOTE RECOMMENDATION.          Failed - Medication is active on med list        Passed - Patient is 12 years of age or older        Passed - Recent (12 mo) or future (30 days) visit within the authorizing provider's specialty     Patient had office visit in the last 12 months or has a visit in the next 30 days with authorizing provider or within the authorizing provider's specialty.  See \"Patient Info\" tab in inbasket, or \"Choose Columns\" in Meds & Orders section of the refill encounter.              Passed - No active pregnancy on record        Passed - No positive prenancy test is past 12 months        "

## 2019-07-17 NOTE — TELEPHONE ENCOUNTER
Daughter states Rosemarie needs her amoxicillin refilled. Thank You.  Caller informed calls received after 3 pm may not be returned until following day.

## 2019-07-18 RX ORDER — AMOXICILLIN 875 MG
TABLET ORAL
Qty: 20 TABLET | Refills: 0 | Status: SHIPPED | OUTPATIENT
Start: 2019-07-18 | End: 2021-06-03

## 2019-07-18 NOTE — TELEPHONE ENCOUNTER
" Patient is scheduled to be seen on 7/25/19 for dizzy spells. Please see notes below. Daughter believes this is related to a sinus infection d/t these are her past usual symptoms.   Antibiotic pended if appropriate or would you like to see patient sooner?        7/17/19 3:20 PM      Daughter states Rosemarie needs her amoxicillin refilled. Thank You.  Caller informed calls received after 3 pm may not be returned until following day.             7/17/19 3:41 PM        I spoke with patient's daughter. Because there are no clinic appointments available here today, she is instructed to complete an \"Oncare\" visit for her mother. Yesi in agreement and verbalized a good understanding.           "

## 2019-07-28 DIAGNOSIS — E06.3 HYPOTHYROIDISM DUE TO HASHIMOTO'S THYROIDITIS: ICD-10-CM

## 2019-07-29 NOTE — TELEPHONE ENCOUNTER
"Requested Prescriptions   Pending Prescriptions Disp Refills     levothyroxine (SYNTHROID/LEVOTHROID) 25 MCG tablet [Pharmacy Med Name: LEVOTHYROXINE 0.025MG (25MCG) TAB]  Last Written Prescription Date:  05/02/19  Last Fill Quantity: 90,  # refills: 1   Last office visit: 5/1/2019 with prescribing provider:  ARMANDO Conklin   Future Office Visit:     90 tablet 1     Sig: TAKE 1 TABLET(25 MCG) BY MOUTH DAILY       Thyroid Protocol Failed - 7/28/2019  2:52 PM        Failed - Normal TSH on file in past 12 months     Recent Labs   Lab Test 05/01/19  1450   TSH 5.70*              Passed - Patient is 12 years or older        Passed - Recent (12 mo) or future (30 days) visit within the authorizing provider's specialty     Patient had office visit in the last 12 months or has a visit in the next 30 days with authorizing provider or within the authorizing provider's specialty.  See \"Patient Info\" tab in inbasket, or \"Choose Columns\" in Meds & Orders section of the refill encounter.              Passed - Medication is active on med list        Passed - No active pregnancy on record     If patient is pregnant or has had a positive pregnancy test, please check TSH.          Passed - No positive pregnancy test in past 12 months     If patient is pregnant or has had a positive pregnancy test, please check TSH.            "

## 2019-07-30 ENCOUNTER — TRANSFERRED RECORDS (OUTPATIENT)
Dept: HEALTH INFORMATION MANAGEMENT | Facility: CLINIC | Age: 84
End: 2019-07-30

## 2019-07-31 RX ORDER — LEVOTHYROXINE SODIUM 25 UG/1
TABLET ORAL
Qty: 90 TABLET | Refills: 1 | Status: SHIPPED | OUTPATIENT
Start: 2019-07-31 | End: 2022-04-23

## 2019-08-14 LAB — EJECTION FRACTION: NORMAL %

## 2019-08-19 ASSESSMENT — MIFFLIN-ST. JEOR
SCORE: 1184.78
SCORE: 1184.78

## 2019-08-22 ENCOUNTER — SURGERY - HEALTHEAST (OUTPATIENT)
Dept: SURGERY | Facility: AMBULATORY SURGERY CENTER | Age: 84
End: 2019-08-22

## 2019-08-22 ENCOUNTER — HOSPITAL ENCOUNTER (OUTPATIENT)
Dept: SURGERY | Facility: AMBULATORY SURGERY CENTER | Age: 84
Discharge: HOME | End: 2019-08-22
Attending: PHYSICAL MEDICINE & REHABILITATION | Admitting: PHYSICAL MEDICINE & REHABILITATION
Payer: COMMERCIAL

## 2019-08-22 ASSESSMENT — MIFFLIN-ST. JEOR
SCORE: 1184.78
SCORE: 1184.78

## 2019-11-29 ENCOUNTER — TELEPHONE (OUTPATIENT)
Dept: FAMILY MEDICINE | Facility: CLINIC | Age: 84
End: 2019-11-29

## 2019-11-29 NOTE — TELEPHONE ENCOUNTER
Panel Management Review  Summary:    Patient is due/failing the following:     Health Maintenance Due   Topic Date Due     PNEUMOCOCCAL IMMUNIZATION 65+ LOW/MEDIUM RISK (2 of 2 - PCV13) 08/26/2009     MEDICARE ANNUAL WELLNESS VISIT  02/10/2017     DTAP/TDAP/TD IMMUNIZATION (2 - Td) 08/26/2018     FALL RISK ASSESSMENT  05/21/2019     INFLUENZA VACCINE (1) 09/01/2019        Type of outreach:    Sent letter.                                                                                                                   Deepa Penn    Chart routed to Care Team .

## 2019-11-29 NOTE — LETTER
November 29, 2019      Rosemarie Fry  [address] [facility] [address]        Dear Rosemarie,     In order to ensure we are providing the best quality care, we have reviewed your chart and see that you are due for the following.    1. Your next office visit is due for Medicare Annual Wellness Exam   2. Fasting/Non-Fasting labs- thyroid   3. Vaccines-influenza, pneumonia, and tetanus     For fasting labs please fast for at least 10 hours. You can still take your medications and have water.    We greatly appreciate the opportunity to serve you.  Thank you for trusting us with your health care.    If you are now being seen elsewhere please let us know and we will remove you from the list.    Sincerely,  [facility]

## 2020-02-14 ENCOUNTER — TRANSFERRED RECORDS (OUTPATIENT)
Dept: HEALTH INFORMATION MANAGEMENT | Facility: CLINIC | Age: 85
End: 2020-02-14

## 2020-06-29 ENCOUNTER — OFFICE VISIT (OUTPATIENT)
Dept: FAMILY MEDICINE | Facility: CLINIC | Age: 85
End: 2020-06-29
Payer: COMMERCIAL

## 2020-06-29 VITALS
DIASTOLIC BLOOD PRESSURE: 80 MMHG | BODY MASS INDEX: 32.76 KG/M2 | TEMPERATURE: 98.3 F | HEART RATE: 82 BPM | RESPIRATION RATE: 20 BRPM | SYSTOLIC BLOOD PRESSURE: 130 MMHG | WEIGHT: 182 LBS | OXYGEN SATURATION: 96 %

## 2020-06-29 DIAGNOSIS — R53.83 FATIGUE, UNSPECIFIED TYPE: ICD-10-CM

## 2020-06-29 DIAGNOSIS — G44.229 CHRONIC TENSION-TYPE HEADACHE, NOT INTRACTABLE: ICD-10-CM

## 2020-06-29 DIAGNOSIS — K21.9 GASTROESOPHAGEAL REFLUX DISEASE WITHOUT ESOPHAGITIS: ICD-10-CM

## 2020-06-29 DIAGNOSIS — E55.9 VITAMIN D DEFICIENCY: ICD-10-CM

## 2020-06-29 DIAGNOSIS — G44.40 ANALGESIC REBOUND HEADACHE: ICD-10-CM

## 2020-06-29 DIAGNOSIS — E06.3 HYPOTHYROIDISM DUE TO HASHIMOTO'S THYROIDITIS: Primary | ICD-10-CM

## 2020-06-29 DIAGNOSIS — T39.95XA ANALGESIC REBOUND HEADACHE: ICD-10-CM

## 2020-06-29 DIAGNOSIS — J41.8 MIXED SIMPLE AND MUCOPURULENT CHRONIC BRONCHITIS (H): ICD-10-CM

## 2020-06-29 DIAGNOSIS — I48.20 CHRONIC ATRIAL FIBRILLATION (H): ICD-10-CM

## 2020-06-29 LAB
ALBUMIN SERPL-MCNC: 3.8 G/DL (ref 3.4–5)
ALP SERPL-CCNC: 115 U/L (ref 40–150)
ALT SERPL W P-5'-P-CCNC: 35 U/L (ref 0–50)
ANION GAP SERPL CALCULATED.3IONS-SCNC: 7 MMOL/L (ref 3–14)
AST SERPL W P-5'-P-CCNC: 18 U/L (ref 0–45)
BASOPHILS # BLD AUTO: 0 10E9/L (ref 0–0.2)
BASOPHILS NFR BLD AUTO: 0.6 %
BILIRUB SERPL-MCNC: 0.3 MG/DL (ref 0.2–1.3)
BUN SERPL-MCNC: 24 MG/DL (ref 7–30)
CALCIUM SERPL-MCNC: 9.3 MG/DL (ref 8.5–10.1)
CHLORIDE SERPL-SCNC: 109 MMOL/L (ref 94–109)
CO2 SERPL-SCNC: 25 MMOL/L (ref 20–32)
CREAT SERPL-MCNC: 0.88 MG/DL (ref 0.52–1.04)
DIFFERENTIAL METHOD BLD: ABNORMAL
EOSINOPHIL # BLD AUTO: 0.1 10E9/L (ref 0–0.7)
EOSINOPHIL NFR BLD AUTO: 2.3 %
ERYTHROCYTE [DISTWIDTH] IN BLOOD BY AUTOMATED COUNT: 13.2 % (ref 10–15)
GFR SERPL CREATININE-BSD FRML MDRD: 60 ML/MIN/{1.73_M2}
GLUCOSE SERPL-MCNC: 127 MG/DL (ref 70–99)
HCT VFR BLD AUTO: 36.5 % (ref 35–47)
HGB BLD-MCNC: 12.3 G/DL (ref 11.7–15.7)
LYMPHOCYTES # BLD AUTO: 1.7 10E9/L (ref 0.8–5.3)
LYMPHOCYTES NFR BLD AUTO: 32.4 %
MCH RBC QN AUTO: 33.3 PG (ref 26.5–33)
MCHC RBC AUTO-ENTMCNC: 33.7 G/DL (ref 31.5–36.5)
MCV RBC AUTO: 99 FL (ref 78–100)
MONOCYTES # BLD AUTO: 0.5 10E9/L (ref 0–1.3)
MONOCYTES NFR BLD AUTO: 9 %
NEUTROPHILS # BLD AUTO: 2.9 10E9/L (ref 1.6–8.3)
NEUTROPHILS NFR BLD AUTO: 55.7 %
PLATELET # BLD AUTO: 231 10E9/L (ref 150–450)
POTASSIUM SERPL-SCNC: 4.3 MMOL/L (ref 3.4–5.3)
PROT SERPL-MCNC: 7.9 G/DL (ref 6.8–8.8)
RBC # BLD AUTO: 3.69 10E12/L (ref 3.8–5.2)
SODIUM SERPL-SCNC: 141 MMOL/L (ref 133–144)
WBC # BLD AUTO: 5.2 10E9/L (ref 4–11)

## 2020-06-29 PROCEDURE — 80053 COMPREHEN METABOLIC PANEL: CPT | Performed by: PHYSICIAN ASSISTANT

## 2020-06-29 PROCEDURE — 85025 COMPLETE CBC W/AUTO DIFF WBC: CPT | Performed by: PHYSICIAN ASSISTANT

## 2020-06-29 PROCEDURE — 99214 OFFICE O/P EST MOD 30 MIN: CPT | Performed by: PHYSICIAN ASSISTANT

## 2020-06-29 PROCEDURE — 84439 ASSAY OF FREE THYROXINE: CPT | Performed by: PHYSICIAN ASSISTANT

## 2020-06-29 PROCEDURE — 36415 COLL VENOUS BLD VENIPUNCTURE: CPT | Performed by: PHYSICIAN ASSISTANT

## 2020-06-29 PROCEDURE — 82306 VITAMIN D 25 HYDROXY: CPT | Performed by: PHYSICIAN ASSISTANT

## 2020-06-29 PROCEDURE — 84443 ASSAY THYROID STIM HORMONE: CPT | Performed by: PHYSICIAN ASSISTANT

## 2020-06-29 RX ORDER — FAMOTIDINE 40 MG/1
40 TABLET, FILM COATED ORAL DAILY
Qty: 90 TABLET | Refills: 3 | Status: SHIPPED | OUTPATIENT
Start: 2020-06-29 | End: 2021-06-03

## 2020-06-29 NOTE — PROGRESS NOTES
Subjective     Rosemarie Fry is a 85 year old female who presents to clinic today for the following health issues:    HPI     Concern - Hypotension - low readings; balance issues since last sinus infection  Onset: 6 months    Description:     She had stopped taking her thyroid med months ago. Noted ha's and feeling a bit off balance. Restarted her thyroid med and now she's starting to feel markedly better.   Feels like there is a disconnect between brain and her ears.  Wondering if it is a hearing problem or if she should have a referral for audiologist  Feels off balance, sometimes gets dizzy but has not fallen, sometimes feels like she could faint - thinks this could be due to poor eating    Intensity: moderate    Progression of Symptoms:  Unsure if it is getting worse or staying the same    Accompanying Signs & Symptoms:  See above    Previous history of similar problem:   none    Precipitating factors:   Worsened by: none    Alleviating factors:  Improved by: none    Therapies Tried and outcome: NA  She denies chest pain/sob/palps.   Some mild decline in hearing .  No profound vision changes.  She denies true vertigo.  No unilateral paresthesias/paresis.   Some occasional ear aches  Recheck of her afib.  Recheck of her copd. She denies significant wheezing or sob. No cough.  Had been taking a lot of tylenol for her ha's. Stopped this practice the last couple of days.  Some gerd symptoms.     Patient Active Problem List   Diagnosis     Seasonal allergic rhinitis     Allergic rhinitis due to animal dander     CARDIOVASCULAR SCREENING; LDL GOAL LESS THAN 160     Osteoporosis     GERD (gastroesophageal reflux disease)     Advance Care Planning     COPD (chronic obstructive pulmonary disease) (H)     Shoulder impingement     Varicose veins of legs     Diagnostic skin and sensitization tests (aka ALLERGENS)     Cataract, mild-mod, ou     Posterior vitreous detachment, ou     Glaucoma suspect, ou     Chronic atrial  fibrillation (H)     Subclinical hypothyroidism     Mixed simple and mucopurulent chronic bronchitis (H)     Past Surgical History:   Procedure Laterality Date     C APPENDECTOMY       LAPAROSCOPIC ASSISTED HYSTERECTOMY VAGINAL         Social History     Tobacco Use     Smoking status: Former Smoker     Packs/day: 1.00     Years: 10.00     Pack years: 10.00     Types: Cigarettes     Last attempt to quit: 2007     Years since quittin.2     Smokeless tobacco: Never Used   Substance Use Topics     Alcohol use: No     Alcohol/week: 0.0 standard drinks     Family History   Problem Relation Age of Onset     Diabetes Brother      Hypertension Brother      Asthma Daughter      Cancer No family hx of      Cerebrovascular Disease No family hx of      Thyroid Disease No family hx of      Glaucoma No family hx of      Macular Degeneration No family hx of          Current Outpatient Medications   Medication Sig Dispense Refill     famotidine (PEPCID) 40 MG tablet Take 1 tablet (40 mg) by mouth daily 90 tablet 3     gabapentin (NEURONTIN) 100 MG capsule Week 1: 100 mg in the evening. Week 2: 100 mg times a day. Week 3 and afterwards : 100 mg three times a day  PATIENT SAYS SHE WILL TAKE MEDICATION WHEN NEEDED BECAUSE SHE IS LEARY ABOUT THIS MEDICATION. 270 capsule 0     levothyroxine (SYNTHROID/LEVOTHROID) 25 MCG tablet TAKE 1 TABLET(25 MCG) BY MOUTH DAILY 90 tablet 1     metoprolol (LOPRESSOR) 25 MG tablet Take 25 mg by mouth 2 times daily        nitroglycerin (NITROSTAT) 0.4 MG SL tablet Place 1 tablet (0.4 mg) under the tongue every 5 minutes as needed for chest pain If you are still having symptoms after 3 doses (15 minutes) call 911. 25 tablet 0     rivaroxaban ANTICOAGULANT (XARELTO) 20 MG TABS tablet Take 1 tablet (20 mg) by mouth daily (with dinner)       albuterol (PROAIR HFA/PROVENTIL HFA/VENTOLIN HFA) 108 (90 Base) MCG/ACT Inhaler Inhale 2 puffs into the lungs every 6 hours as needed for shortness of breath /  dyspnea or wheezing (Patient not taking: Reported on 6/29/2020) 1 Inhaler 1     amoxicillin (AMOXIL) 875 MG tablet TAKE 1 TABLET(875 MG) BY MOUTH TWICE DAILY FOR 10 DAYS (Patient not taking: Reported on 6/29/2020) 20 tablet 0     ranitidine (ZANTAC) 300 MG tablet Take 1 tablet (300 mg) by mouth At Bedtime (Patient not taking: Reported on 6/29/2020) 90 tablet 3     triamcinolone (KENALOG) 0.1 % external ointment Apply topically 2 times daily (Patient not taking: Reported on 6/29/2020) 30 g 1     Allergies   Allergen Reactions     Sulfa Drugs Rash     Recent Labs   Lab Test 05/01/19  1450 12/11/18  1318 11/26/18  1447 05/21/18  1437 05/10/18  0954 09/02/16  1544 02/17/16  1621  04/09/14  0831   LDL  --  132*  --   --   --   --  141*  --  131*   HDL  --  44*  --   --   --   --  49*  --  44*   TRIG  --  218*  --   --   --   --  111  --  106   ALT  --   --   --   --   --  30 24  --   --    CR  --   --  0.93 0.86  --  0.96 0.82   < >  --    GFRESTIMATED  --   --  57* 63  --  56* 66   < >  --    GFRESTBLACK  --   --  69 76  --  68 80   < >  --    POTASSIUM  --   --  4.6 4.4  --  4.2 4.4   < >  --    TSH 5.70*  --   --   --  6.54* 7.08* 5.94*   < >  --     < > = values in this interval not displayed.      BP Readings from Last 3 Encounters:   06/29/20 130/80   05/01/19 134/77   01/17/19 120/72    Wt Readings from Last 3 Encounters:   06/29/20 82.6 kg (182 lb)   05/01/19 81.6 kg (180 lb)   01/17/19 82.6 kg (182 lb)                      Reviewed and updated as needed this visit by Provider         Review of Systems   Constitutional, HEENT, cardiovascular, pulmonary, GI, , musculoskeletal, neuro, skin, endocrine and psych systems are negative, except as otherwise noted.      Objective    /80   Pulse 82   Temp 98.3  F (36.8  C) (Tympanic)   Resp 20   Wt 82.6 kg (182 lb)   SpO2 96%   BMI 32.76 kg/m    Body mass index is 32.76 kg/m .  Physical Exam   Eye exam - right eye normal lid, conjunctiva, cornea, pupil and  fundus, left eye normal lid, conjunctiva, cornea, pupil and fundus.  ENT exam reveals - ENT exam normal, no neck nodes or sinus tenderness.  Thyroid not palpable, not enlarged, no nodules detected.  CHEST:chest clear to IPPA, no tachypnea, retractions or cyanosis and S1, S2 normal, no murmur, no gallop, rate regular.  Her disks are flat. Pupils equal, round, reactive to light. Extraocular movements full. Visual fields full. Face moves symmetrically. Tongue midline. Hearing mildly decreased to finger-rubbing at approximately 6-8 inches. Neck without bruits. CV: S1, S2. Motor strength 5/5. Reflexes were 2/4. Toe signs were downgoing. Normal position sense. Good finger-nose-finger and fine finger movement. Gait: she cheryl from a chair without difficulty and has a mildly broad-based gait.    Negative hallpike maneuvers.    Suspect some of her recent symptoms are due to her hypothyroidism.  Could be some tension headaches.     Rosemarie was seen today for hypotension and sinus problem.    Diagnoses and all orders for this visit:    Hypothyroidism due to Hashimoto's thyroiditis  -     TSH with free T4 reflex    Mixed simple and mucopurulent chronic bronchitis (H)    Chronic atrial fibrillation (H)  -     Cancel: Basic metabolic panel  (Ca, Cl, CO2, Creat, Gluc, K, Na, BUN)    Gastroesophageal reflux disease without esophagitis  -     famotidine (PEPCID) 40 MG tablet; Take 1 tablet (40 mg) by mouth daily    Vitamin D deficiency  -     Vitamin D Deficiency    Fatigue, unspecified type  -     CBC with platelets and differential  -     Comprehensive metabolic panel (BMP + Alb, Alk Phos, ALT, AST, Total. Bili, TP)    Analgesic rebound headache    Chronic tension-type headache, not intractable      Continue levothyroxine. tsh is expected to be elevated.     work on lifestyle modification  Recheck in 2-3 mos

## 2020-06-30 LAB
DEPRECATED CALCIDIOL+CALCIFEROL SERPL-MC: 39 UG/L (ref 20–75)
T4 FREE SERPL-MCNC: 0.94 NG/DL (ref 0.76–1.46)
TSH SERPL DL<=0.005 MIU/L-ACNC: 5.56 MU/L (ref 0.4–4)

## 2020-10-26 DIAGNOSIS — T39.95XA ANALGESIC REBOUND HEADACHE: ICD-10-CM

## 2020-10-26 DIAGNOSIS — G44.229 CHRONIC TENSION-TYPE HEADACHE, NOT INTRACTABLE: ICD-10-CM

## 2020-10-26 DIAGNOSIS — G44.40 ANALGESIC REBOUND HEADACHE: ICD-10-CM

## 2020-10-26 NOTE — LETTER
October 28, 2020        Rosemarie Fry  [address] [facility] [address]      Dear Rosemarie,    Your medication has been approved for gabapentin (NEURONTIN) 100 MG capsule.    However, you are due for a follow up appointment for med check, recheck of headaches further refills. Please schedule this visit at your earliest convenience.    Thank you.      Alden Conklin's Care Team

## 2020-10-27 NOTE — TELEPHONE ENCOUNTER
Routing refill request to provider for review/approval because:  Drug not on the [facility] refill protocol     Nancy HUBBARDN, RN

## 2020-10-28 RX ORDER — GABAPENTIN 100 MG/1
CAPSULE ORAL
Qty: 270 CAPSULE | Refills: 0 | Status: SHIPPED | OUTPATIENT
Start: 2020-10-28 | End: 2021-01-31

## 2020-10-28 NOTE — TELEPHONE ENCOUNTER
salvador is due for a visit with me. Schedule her for a virtual (video or phone based on patient preference. Always promote a video visit first) visit with me. Med check and recheck of her headaches

## 2020-10-28 NOTE — TELEPHONE ENCOUNTER
Hard copy of Rx for Gabapentin faxed to [facility] [place] [facility].

## 2021-01-29 DIAGNOSIS — G44.229 CHRONIC TENSION-TYPE HEADACHE, NOT INTRACTABLE: ICD-10-CM

## 2021-01-29 DIAGNOSIS — G44.40 ANALGESIC REBOUND HEADACHE: ICD-10-CM

## 2021-01-29 DIAGNOSIS — T39.95XA ANALGESIC REBOUND HEADACHE: ICD-10-CM

## 2021-01-31 RX ORDER — GABAPENTIN 100 MG/1
CAPSULE ORAL
Qty: 270 CAPSULE | Refills: 0 | Status: SHIPPED | OUTPATIENT
Start: 2021-01-31 | End: 2022-04-23

## 2021-04-12 ENCOUNTER — TRANSFERRED RECORDS (OUTPATIENT)
Dept: HEALTH INFORMATION MANAGEMENT | Facility: CLINIC | Age: 86
End: 2021-04-12

## 2021-05-31 NOTE — PROCEDURES
"DIAGNOSTIC LUMBAR MEDIAL BRANCH BLOCKS WITH FLUOROSCOPIC GUIDANCE BILATERAL L3, L4, L5    Pre Procedure Diagnosis:  LBP, Lumbar Facet Syndrome, Lumbar spondylosis  Post Procedure Diagnosis:  Same  Procedure Performed:  Diagnostic Lumbar Medial Branch Block with Fluoroscopic Guidance    Clinical Scenario:  As per office notes  Anesthesia/Fluids:  As per intra-procedure documentation  Vital Signs:  As per intra-procedure documentation  Level Injected:  L3, L4, L5  Side Injected: Bilateral  CC:        The procedure of lumbar medial branch block was discussed in detail along with the attendant risks, including but not limited to:  back pain, nerve injury, infection, bleeding, worsened pain, allergic reaction.  The patient was also informed that there may be the need for additional injections (or other interventions, such as: facet joint injections,radiofrequency procedures..) depending on response to the injections today.  The patient decided to proceed and signed informed consent.    The patient denies any symptoms of an active infection and denies taking antibiotics. The patient denies taking any prescription blood thinning medications. The patient denies any allergies to iodine or iodine contrast.      The patient was placed  in the prone position in the fluoroscopy table. A procedural pause was performed to verify the patient's name and  as well as the site and side of the injection. The low back was then  prepped and draped in the usual sterile fashion.  After localizing the anatomy under fluoroscopy 3.5\",25 gauge spinal needles were introduced into the bilateral L3, L4 and L5 medial branch levels mentioned above under fluoroscopic guidance.   After aspiration was negative for blood or CSF, 0.1 ml of Omnipaque 300 was injected at each site.  There was no vascular uptake.  Subsequently, 0.5 ml of 0.75% Lidocaine was injected at each level.  The needles were withdrawn without flushing.        The patient tolerated " the procedure well and instructed in the use of a pain diary.  If there are questions or concerns that arise, the patient was instructed to call the spine clinic.  After a short period of observation, the patient was discharged.  The patient will be contacted with the next steps in care after the pain diary is received and reviewed.

## 2021-05-31 NOTE — PROCEDURES
LUMBAR RADIOFREQUENCY ABLATION WITH FLUOROSCOPIC GUIDANCE  Performed on: 4/2/15    Pre Procedure Diagnosis:  lumbar spondylosis   Post Procedure Diagnosis:  Same  Procedure Performed:  Diagnostic Lumbar Radiofrequency Ablation with Fluoroscopic Guidance    Clinical Scenario:  As per office notes  Anesthesia/Fluids:  As per intra-procedure documentation  Vital Signs:  As per intra-procedure documentation  Level Injected:  L3, L4, L5  Side Injected:  Bilateral  CC:    Patient is aware of presently available therapeutic options, including bed rest, traction, NSAID's, oral steroids, physical therapy, epidural steroids, facet injections and surgery.  The procedure of lumbar medial branch block was discussed in detail along with the attendant risks, including but not limited to:  back pain, subdural puncture with a subsequent headache (possible need for epidural blood patch), nerve injury, infection, bleeding, epidural hematoma (with need for surgical evacuation), allergic reaction,  worsening of pain along with risk of stroke, paralysis and death.  The patient was also informed that there may be the need for additional injections (or other interventions, such as: facet joint injections, transforaminal epidural steroid injections, sacroiliac joint injections, discography, radiofrequency procedures... ) depending on follow-up evaluation.  The patient decided to proceed.    The patient was placed  in the prone position in the fluoroscopy table. The low back was prepped and draped in the usual sterile fashion.  After anesthetizing the skin, a 10 cm,18 gauge RFA cannulae was introduced into medial branch levels mentioned above under fluoroscopic guidance.  Motor testing was performed.  The areas were subsequently anesthetized with 2 ml of 2% Lidocaine.  A period of 2 minutes elapsed prior to lesioning at 90 degrees centegrade for 90 seconds. The probes were rotated 90 degrees before lesioning was repeated.  The identical  procedure was performed on both the left and the right side.The patient tolerated the procedure well.   If there are any complications after discharge, they were instructed to call us.

## 2021-06-02 VITALS
BODY MASS INDEX: 33.61 KG/M2 | HEIGHT: 61 IN | WEIGHT: 178 LBS | WEIGHT: 178 LBS | HEIGHT: 61 IN | BODY MASS INDEX: 33.61 KG/M2

## 2021-06-02 NOTE — PROGRESS NOTES
Assessment & Plan   Problem List Items Addressed This Visit     None      Visit Diagnoses     Chronic pansinusitis    -  Primary    Relevant Medications    amoxicillin (AMOXIL) 875 MG tablet    fluticasone (FLONASE) 50 MCG/ACT nasal spray         Impression is likely viral sinusitis vs URI. Less likely COVID-19 given duration and constellation of symptoms and the patient is vaccinated. Appears well and non-toxic and I have low suspicion for impending airway obstruction or respiratory distress.  She will push p.o. fluids, use over-the-counter meds for symptoms, complete a course of amoxicillin (as she has done well with this before with sinusitis) and using fluticason nasal spray.  Follow-up with us in 2 weeks if not improving or urgent care/the ER if symptoms worsen/change at any time.    Complete history and physical exam as below. AF with normal VS.    DDx and Dx discussed with and explained to the pt to their satisfaction.  All questions were answered at this time. Pt expressed understanding of and agreement with this dx, tx, and plan. No further workup warranted and standard medication warnings given. I have given the patient a list of pertinent indications for re-evaluation. Will go to the Emergency Department if symptoms worsen or new concerning symptoms arise. Patient left in no apparent distress.     34 minutes spent on the date of the encounter doing chart review, history and exam, documentation and further activities per the note     See Patient Instructions    Return in about 1 month (around 7/3/2021) for a recheck of your symptoms if not improving, or call 911/go to an ER anytime if worsening.    ALIA Goldberg  [facility] [place] is a 86 year old who presents for the following health issues:  HPI     Acute Illness  Acute illness concerns: pressure across forehead and below eyes. Feels like her balance is off intermittently and uses a cane.   Onset/Duration:  1 year intermittently.  Symptoms:  Fever: no  Chills/Sweats: no  Headache (location?): YES- head feels clogged up  Sinus Pressure: YES  Conjunctivitis:  YES  Ear Pain: not sure  Rhinorrhea: YES, usually when eating. Usually clear mucus.   Congestion: no  Sore Throat: no  Cough: No  Wheeze: no  Decreased Appetite: no  Nausea: no  Vomiting: no  Diarrhea: YES  Dysuria/Freq.: YES- drinks a lot of water  Dysuria or Hematuria: no  Fatigue/Achiness: no  Sick/Strep Exposure: no  Therapies tried and outcome: Tylenol    Does not use her albuterol inhaler any more. BPs at home have been in the 120 for SBP.    Review of Systems   Constitutional, HEENT, cardiovascular, pulmonary, gi and gu systems are negative, except as otherwise noted.      Objective    BP (!) 155/94   Pulse 93   Temp 98.7  F (37.1  C)   Resp 16   SpO2 96%   There is no height or weight on file to calculate BMI.  Physical Exam  Vitals signs and nursing note reviewed.   Constitutional:       General: She is not in acute distress.     Appearance: She is not ill-appearing or diaphoretic.   HENT:      Head: Normocephalic and atraumatic.      Comments: Maxillary and frontal sinuses mildly tender to palpation. No overlying signs of trauma or infection.     Nose: Nose normal. No congestion or rhinorrhea.      Mouth/Throat:      Mouth: Mucous membranes are moist.      Pharynx: Oropharynx is clear.   Eyes:      Conjunctiva/sclera: Conjunctivae normal.   Cardiovascular:      Rate and Rhythm: Normal rate and regular rhythm.      Heart sounds: Normal heart sounds. No murmur. No friction rub. No gallop.    Pulmonary:      Effort: Pulmonary effort is normal. No respiratory distress.      Breath sounds: Normal breath sounds. No stridor. No wheezing, rhonchi or rales.   Abdominal:      General: Bowel sounds are normal. There is no distension.      Palpations: Abdomen is soft. There is no mass.      Tenderness: There is no abdominal tenderness. There is no guarding or  rebound.      Hernia: No hernia is present.   Skin:     General: Skin is warm and dry.   Neurological:      General: No focal deficit present.      Mental Status: She is alert. Mental status is at baseline.   Psychiatric:         Mood and Affect: Mood normal.         Behavior: Behavior normal.

## 2021-06-03 ENCOUNTER — OFFICE VISIT (OUTPATIENT)
Dept: FAMILY MEDICINE | Facility: CLINIC | Age: 86
End: 2021-06-03
Payer: COMMERCIAL

## 2021-06-03 VITALS
HEIGHT: 61 IN | WEIGHT: 178 LBS | BODY MASS INDEX: 33.61 KG/M2 | HEIGHT: 61 IN | BODY MASS INDEX: 33.61 KG/M2 | WEIGHT: 178 LBS

## 2021-06-03 VITALS
RESPIRATION RATE: 16 BRPM | HEART RATE: 93 BPM | SYSTOLIC BLOOD PRESSURE: 155 MMHG | OXYGEN SATURATION: 96 % | TEMPERATURE: 98.7 F | DIASTOLIC BLOOD PRESSURE: 94 MMHG

## 2021-06-03 DIAGNOSIS — J32.4 CHRONIC PANSINUSITIS: Primary | ICD-10-CM

## 2021-06-03 PROCEDURE — 99214 OFFICE O/P EST MOD 30 MIN: CPT | Performed by: PHYSICIAN ASSISTANT

## 2021-06-03 RX ORDER — AMOXICILLIN 875 MG
875 TABLET ORAL 2 TIMES DAILY
Qty: 14 TABLET | Refills: 0 | Status: SHIPPED | OUTPATIENT
Start: 2021-06-03 | End: 2021-06-10

## 2021-06-03 RX ORDER — FLUTICASONE PROPIONATE 50 MCG
1 SPRAY, SUSPENSION (ML) NASAL DAILY
Qty: 18.2 ML | Refills: 0 | Status: SHIPPED | OUTPATIENT
Start: 2021-06-03 | End: 2022-04-23

## 2021-06-03 NOTE — PATIENT INSTRUCTIONS
Lance Houston,    Thank you for allowing [facility] to manage your care.    I sent your prescriptions to your pharmacy.    If you have any questions or concerns, please feel free to call us at [phone]    Sincerely,    Que Powell PA-C    Did you know?      You can schedule a video visit for follow-up appointments as well as future appointments for certain conditions.  Please see the below link.     [email or web address]    If you have not already done so,  I encourage you to sign up for [employer] ([email or web address]).  This will allow you to review your results, securely communicate with a provider, and schedule virtual visits as well.      Patient Education     Sinusitis (Antibiotic Treatment)    The sinuses are air-filled spaces within the bones of the face. They connect to the inside of the nose. Sinusitis is an inflammation of the tissue that lines the sinuses. Sinusitis can occur during a cold. It can also happen due to allergies to pollens and other particles in the air. Sinusitis can cause symptoms of sinus congestion and a feeling of fullness. A sinus infection causes fever, headache, and facial pain. There is often green or yellow fluid draining from the nose or into the back of the throat (post-nasal drip). You have been given antibiotics to treat this condition.   Home care    Take the full course of antibiotics as instructed. Don't stop taking them, even when you feel better.    Drink plenty of water, hot tea, and other liquids as directed by the healthcare provider. This may help thin nasal mucus. It also may help your sinuses drain fluids.    Heat may help soothe painful areas of your face. Use a towel soaked in hot water. Or,  the shower and direct the warm spray onto your face. Using a vaporizer along with a menthol rub at night may also help soothe symptoms.     An expectorant with guaifenesin may help thin nasal mucus and help  your sinuses drain fluids. Talk with your provider or pharmacists before taking an over-the-counter (OTC) medicine if you have any questions about it or its side effects..    You can use an OTC decongestant, unless a similar medicine was prescribed to you. Nasal sprays work the fastest. Use one that contains phenylephrine or oxymetazoline. First blow your nose gently. Then use the spray. Don't use these medicines more often than directed on the label. If you do, your symptoms may get worse. You may also take pills that contain pseudoephedrine. Don t use products that combine multiple medicines. This is because side effects may be increased. Read labels. You can also ask the pharmacist for help. (People with high blood pressure should not use decongestants. They can raise blood pressure.) Talk with your provider or pharmacist if you have any questions about the medicine..    OTC antihistamines may help if allergies contributed to your sinusitis. Talk with your provider or pharmacist if you have any questions about the medicine..    Don't use nasal rinses or irrigation during an acute sinus infection, unless your healthcare provider tells you to. Rinsing may spread the infection to other areas in your sinuses.    Use acetaminophen or ibuprofen to control pain, unless another pain medicine was prescribed to you. If you have chronic liver or kidney disease or ever had a stomach ulcer, talk with your healthcare provider before using these medicines. Never give aspirin to anyone under age 18 who is ill with a fever. It may cause severe liver damage.    Don't smoke. This can make symptoms worse.    Follow-up care  Follow up with your healthcare provider, or as advised.   When to seek medical advice  Call your healthcare provider if any of these occur:     Facial pain or headache that gets worse    Stiff neck    Unusual drowsiness or confusion    Swelling of your forehead or eyelids    Symptoms don't go away in 10  days    Vision problems, such as blurred or double vision    Fever of 100.4 F (38 C) or higher, or as directed by your healthcare provider  Call 911  Call 911 if any of these occur:     Seizure    Trouble breathing    Feeling dizzy or faint    Fingernails, skin or lips look blue, purple , or gray  Prevention  Here are steps you can take to help prevent an infection:     Keep good hand washing habits.    Don t have close contact with people who have sore throats, colds, or other upper respiratory infections.    Don t smoke, and stay away from secondhand smoke.    Stay up to date with of your vaccines.  [employer] last reviewed this educational content on 12/1/2019 2000-2021 [employer]. All rights reserved. This information is not intended as a substitute for professional medical care. Always follow your healthcare professional's instructions.

## 2021-08-12 ENCOUNTER — TRANSFERRED RECORDS (OUTPATIENT)
Dept: HEALTH INFORMATION MANAGEMENT | Facility: CLINIC | Age: 86
End: 2021-08-12

## 2021-09-07 ENCOUNTER — TELEPHONE (OUTPATIENT)
Dept: FAMILY MEDICINE | Facility: CLINIC | Age: 86
End: 2021-09-07

## 2021-09-07 NOTE — TELEPHONE ENCOUNTER
Reason for call:  Other   Patient called regarding (reason for call): About her Mother's upcoming appointment with Alden Conklin  Additional comments:     Daughter is calling in, she is unable to make the upcoming appointment and wanted Alden to know a few things as its hard for her mother to remember and or talk about.    First is that she feels the MR needs to be re-done as she feels it is old, if she sees a spine specialist they will tell her it too old. Rosemarie's pain keeps increasing. (last MRI was 1/31/2017) Low back pain. There is a physical therapist and spine doctor in [facility]. Dr. Lopez and Konstantin for PT.     Second she get reoccurring sinus infection, maybe she is having reoccurring bladder infections? She gets dizzy on and off and she is foggy in her thinking.     Third she needs a new cardiologist, she is thinking that the Afib is happening more often than known and this could be causing the dizziness as well. (she would like a referral to [facility]) ([facility] [phone], [address], [address] [place] [address])    Please contact her to discuss. Is there a way to get Rosemarie scheduled on a Monday, Yesi is off on mondays so she can be at the appointment.     Phone number to reach patient:  Lizeth Key [phone]    Best Time:      Can we leave a detailed message on this number?  YES    Travel screening: Not Applicable

## 2021-09-22 ENCOUNTER — ANCILLARY PROCEDURE (OUTPATIENT)
Dept: GENERAL RADIOLOGY | Facility: CLINIC | Age: 86
End: 2021-09-22
Attending: PHYSICIAN ASSISTANT
Payer: COMMERCIAL

## 2021-09-22 ENCOUNTER — OFFICE VISIT (OUTPATIENT)
Dept: FAMILY MEDICINE | Facility: CLINIC | Age: 86
End: 2021-09-22
Payer: COMMERCIAL

## 2021-09-22 VITALS
RESPIRATION RATE: 16 BRPM | WEIGHT: 174 LBS | SYSTOLIC BLOOD PRESSURE: 138 MMHG | DIASTOLIC BLOOD PRESSURE: 83 MMHG | BODY MASS INDEX: 32.88 KG/M2 | HEART RATE: 80 BPM | TEMPERATURE: 98.7 F | OXYGEN SATURATION: 96 %

## 2021-09-22 DIAGNOSIS — J32.4 CHRONIC PANSINUSITIS: ICD-10-CM

## 2021-09-22 DIAGNOSIS — R07.89 ATYPICAL CHEST PAIN: ICD-10-CM

## 2021-09-22 DIAGNOSIS — I48.20 CHRONIC ATRIAL FIBRILLATION (H): ICD-10-CM

## 2021-09-22 DIAGNOSIS — M54.50 CHRONIC BILATERAL LOW BACK PAIN WITHOUT SCIATICA: ICD-10-CM

## 2021-09-22 DIAGNOSIS — E06.3 HYPOTHYROIDISM DUE TO HASHIMOTO'S THYROIDITIS: ICD-10-CM

## 2021-09-22 DIAGNOSIS — J32.4 CHRONIC PANSINUSITIS: Primary | ICD-10-CM

## 2021-09-22 DIAGNOSIS — G89.29 CHRONIC BILATERAL LOW BACK PAIN WITHOUT SCIATICA: ICD-10-CM

## 2021-09-22 DIAGNOSIS — J41.8 MIXED SIMPLE AND MUCOPURULENT CHRONIC BRONCHITIS (H): ICD-10-CM

## 2021-09-22 PROCEDURE — 99214 OFFICE O/P EST MOD 30 MIN: CPT | Performed by: PHYSICIAN ASSISTANT

## 2021-09-22 PROCEDURE — 72100 X-RAY EXAM L-S SPINE 2/3 VWS: CPT | Performed by: RADIOLOGY

## 2021-09-22 PROCEDURE — 70210 X-RAY EXAM OF SINUSES: CPT | Performed by: RADIOLOGY

## 2021-09-22 RX ORDER — NITROGLYCERIN 0.4 MG/1
0.4 TABLET SUBLINGUAL EVERY 5 MIN PRN
Qty: 25 TABLET | Refills: 0 | Status: SHIPPED | OUTPATIENT
Start: 2021-09-22

## 2021-09-22 RX ORDER — PREDNISONE 20 MG/1
20 TABLET ORAL 2 TIMES DAILY
Qty: 14 TABLET | Refills: 0 | Status: SHIPPED | OUTPATIENT
Start: 2021-09-22 | End: 2021-09-29

## 2021-09-22 RX ORDER — AMOXICILLIN 875 MG
875 TABLET ORAL 2 TIMES DAILY
Qty: 20 TABLET | Refills: 1 | Status: SHIPPED | OUTPATIENT
Start: 2021-09-22 | End: 2021-10-02

## 2021-09-22 NOTE — PROGRESS NOTES
Subjective   Rosemarie is a 86 year old who presents for the following health issues     HPI   Concerns - traveling to [place] wants make sure she is okay to travel, requesting an order of Amoxicillin just in case she gets sick there     Following up on: Back and head pain    Last visit this was discussed: I do not know it was a long time ago     Progression of Symptoms:  Head pain is the same, Back pain is worsening and intermittent    Accompanying Signs & Symptoms: Seen 06/03/21    Taking Medication as prescribed: yes    Side Effects:  None    Medication Helping Symptoms: tylenol and gabapentin help a little   No lower ext radicular pain or paresthesias . Previous epidural steroid injections have only been mildly helpful.  Chronic rhinitis.  No profound sob. No cough. Some facial and frontal pressure.   No irritative/obst voiding symptoms.   Denies chest pain/palps.  History of paroxysmal a fib.  History of copd     Patient defers her dtap and flu shot today     Review of Systems   Constitutional, HEENT, cardiovascular, pulmonary, GI, , musculoskeletal, neuro, skin, endocrine and psych systems are negative, except as otherwise noted.      Objective    /83   Pulse 80   Temp 98.7  F (37.1  C) (Tympanic)   Resp 16   Wt 78.9 kg (174 lb)   SpO2 96%   BMI 32.88 kg/m    Body mass index is 32.88 kg/m .  Physical Exam   Eye exam - right eye normal lid, conjunctiva, cornea, pupil and fundus, left eye normal lid, conjunctiva, cornea, pupil and fundus.  ENT exam reveals - ENT exam normal, no neck nodes or sinus tenderness.  Thyroid not palpable, not enlarged, no nodules detected.  CHEST:chest clear to IPPA, no tachypnea, retractions or cyanosis and S1, S2 normal, no murmur, no gallop, rate regular.  BACK: Lumbosacral spine area reveals local tenderness.  Painful and reduced LS ROM noted. Straight leg raise is negative.  DTR's, motor strength and sensation normal, including heel and toe gait.  Perifpheral  pulses are palpable.  Hipes and knees have full range of motion without pain.  No abdominal tenderness, mass or organomegaly.    Rosemarie was seen today for pain.    Diagnoses and all orders for this visit:    Chronic pansinusitis  -     XR Sinus 1/2 Views; Future  -     predniSONE (DELTASONE) 20 MG tablet; Take 1 tablet (20 mg) by mouth 2 times daily for 7 days    Mixed simple and mucopurulent chronic bronchitis (H)    Chronic atrial fibrillation (H)  -     amoxicillin (AMOXIL) 875 MG tablet; Take 1 tablet (875 mg) by mouth 2 times daily for 10 days    Hypothyroidism due to Hashimoto's thyroiditis  -     TSH WITH FREE T4 REFLEX; Future    Chronic bilateral low back pain without sciatica  -     XR Lumbar Spine 2/3 Views; Future  -     MR Lumbar Spine w/o Contrast; Future  -     predniSONE (DELTASONE) 20 MG tablet; Take 1 tablet (20 mg) by mouth 2 times daily for 7 days    Atypical chest pain  -     nitroGLYcerin (NITROSTAT) 0.4 MG sublingual tablet; Place 1 tablet (0.4 mg) under the tongue every 5 minutes as needed for chest pain If you are still having symptoms after 3 doses (15 minutes) call 911.    Other orders  -     REVIEW OF HEALTH MAINTENANCE PROTOCOL ORDERS  -     TDAP VACCINE (Adacel, Boostrix)  [[id number]]  -     INFLUENZA, QUAD, HD, PF, 65+ (FLUZONE HD)      work on lifestyle modification  Advised supportive and symptomatic treatment.  Follow up with Provider - if condition persists or worsens.

## 2021-09-27 ENCOUNTER — ANCILLARY PROCEDURE (OUTPATIENT)
Dept: MRI IMAGING | Facility: CLINIC | Age: 86
End: 2021-09-27
Attending: PHYSICIAN ASSISTANT
Payer: COMMERCIAL

## 2021-09-27 DIAGNOSIS — M54.50 CHRONIC BILATERAL LOW BACK PAIN WITHOUT SCIATICA: ICD-10-CM

## 2021-09-27 DIAGNOSIS — G89.29 CHRONIC BILATERAL LOW BACK PAIN WITHOUT SCIATICA: ICD-10-CM

## 2021-09-27 PROCEDURE — 72148 MRI LUMBAR SPINE W/O DYE: CPT | Mod: TC | Performed by: RADIOLOGY

## 2021-09-28 DIAGNOSIS — G89.29 CHRONIC BILATERAL LOW BACK PAIN WITHOUT SCIATICA: Primary | ICD-10-CM

## 2021-09-28 DIAGNOSIS — I71.40 ABDOMINAL AORTIC ANEURYSM, WITHOUT RUPTURE: ICD-10-CM

## 2021-09-28 DIAGNOSIS — Z13.6 ENCOUNTER FOR ABDOMINAL AORTIC ANEURYSM SCREENING: ICD-10-CM

## 2021-09-28 DIAGNOSIS — M54.50 CHRONIC BILATERAL LOW BACK PAIN WITHOUT SCIATICA: Primary | ICD-10-CM

## 2021-09-29 ENCOUNTER — ANCILLARY PROCEDURE (OUTPATIENT)
Dept: ULTRASOUND IMAGING | Facility: CLINIC | Age: 86
End: 2021-09-29
Attending: PHYSICIAN ASSISTANT
Payer: COMMERCIAL

## 2021-09-29 DIAGNOSIS — I71.40 ABDOMINAL AORTIC ANEURYSM, WITHOUT RUPTURE: ICD-10-CM

## 2021-09-29 PROCEDURE — 76775 US EXAM ABDO BACK WALL LIM: CPT | Performed by: RADIOLOGY

## 2021-10-03 ENCOUNTER — MYC MEDICAL ADVICE (OUTPATIENT)
Dept: FAMILY MEDICINE | Facility: CLINIC | Age: 86
End: 2021-10-03

## 2021-10-12 ENCOUNTER — TELEPHONE (OUTPATIENT)
Dept: PALLIATIVE MEDICINE | Facility: CLINIC | Age: 86
End: 2021-10-12

## 2021-10-12 NOTE — TELEPHONE ENCOUNTER
[facility] Call Center    Phone Message    May a detailed message be left on voicemail: yes     Reason for Call: Other: Patient's daughter would like to inquire what would be discussed at this appt, or if she is able to be part of the appt via video, as she feels her mother may need assistance with the options of the medical advice. Please call Janis back at [phone]     Action Taken: Message routed to:  [facility] [place] [facility]     Travel Screening: Not Applicable

## 2021-10-13 NOTE — TELEPHONE ENCOUNTER
Med spine eval: 10/25/21    Daughter can come to appointment if she wants.      Routed to schedulers to call Yesi back (there is a release to communicate) and let her know.    Kelsey RN-BSN  [facility]

## 2021-10-13 NOTE — TELEPHONE ENCOUNTER
LM on  for a callback. Please read message below.      Anderson WEBB    [facility] [place]

## 2021-11-01 ENCOUNTER — OFFICE VISIT (OUTPATIENT)
Dept: CARDIOLOGY | Facility: CLINIC | Age: 86
End: 2021-11-01
Payer: COMMERCIAL

## 2021-11-01 VITALS
DIASTOLIC BLOOD PRESSURE: 70 MMHG | WEIGHT: 175 LBS | HEIGHT: 62 IN | BODY MASS INDEX: 32.2 KG/M2 | SYSTOLIC BLOOD PRESSURE: 122 MMHG | HEART RATE: 71 BPM | RESPIRATION RATE: 14 BRPM

## 2021-11-01 DIAGNOSIS — I71.40 ABDOMINAL AORTIC ANEURYSM (AAA) WITHOUT RUPTURE (H): ICD-10-CM

## 2021-11-01 DIAGNOSIS — I77.810 ASCENDING AORTA DILATATION (H): Primary | ICD-10-CM

## 2021-11-01 PROCEDURE — 99204 OFFICE O/P NEW MOD 45 MIN: CPT | Performed by: INTERNAL MEDICINE

## 2021-11-01 ASSESSMENT — MIFFLIN-ST. JEOR: SCORE: 1187.04

## 2021-11-01 NOTE — LETTER
"11/1/2021    Alden Conklin PA-C  [address]  [place] [address]    RE: Rosemarie Fry       Dear Colleague,    I had the pleasure of seeing Rosemarie Fry in the [facility].      HEART CARE ENCOUNTER CONSULTATON NOTE      [facility]  [phone]      Assessment/Recommendations   Assessment/Plan:  Possible ascending aortic and abdominal aneurysms - there is no family history of aneurysms and no personal history of hypertension. Ultrasound of the aorta shows no abd US. To put this to rest I am going to get a CTA chest, abdomen, and pelvis to look for any aneurysms/ectasia. If this looks normal no further follow up will be needed.     Atrial fibrillation - per her primary cardiology team in [place].       History of Present Illness/Subjective    HPI: Rosemarie Fry is a 86 year old female with atrial fibrillation on xarelto and metoprolol, hypothyroidism, bronchitis, sinusitis, venous insufficiency and small airway disease. She is followed at [facility] in [place]. Rosemarie comes to clinic today with her daughter to get a second opinion regarding her dilated ascending aorta and a possible abdominal aortic aneurysm. Rosemarie had a MRI spine which reported an incidental aneurysm: \"There appears to be a focal posterior-laterally directed outpouching of the right aspect of the infrarenal abdominal  aorta (series 5 image 32) concerning for abdominal aortic aneurysm. At this level, the aorta appears to measure over 3 cm in diameter. This was also present at least to some degree on the prior examination.\" A subsequent abdominal US was normal.  The daughter also notes that previous echos have reported a dilated ascending aorta of 3.9-4.0 cm reported as \"mild-moderately\" enlarged. The daughter is concerned that Rosemarie's chronic lower back pain and intermittent dizziness are related to the possible aneurysms. Rosemarie is an " "active woman, exercising daily and taking care of her home and yard by herself without difficulty. There is no dyspnea, chest pain, syncope. She notes rare palpitations if she forgets to take her metoprolol. The dizziness, Rosemarie relates, happens if she has skipped breakfast and resolves with eating. There is no pnd/orhtopnea. She has chronic venous insufficiency edema.     Recent Echocardiogram Results: 8/2019 [facility]   Normal left ventricular size and ejection fraction. Mild left ventricular hypertrophy.    Visually estimated ejection fraction is 60-65%.    Moderate left atrial enlargement.    Aortic valve sclerosis without significant stenosis. No aortic regurgitation.    Mitral annular calcification. Trace mitral regurgitation.  No significant mitral stenosis.    Aortic root measures 3.9cm, ascending aorta measures 3.9cm.    Estimated EF: 60-65%          Physical Examination  Review of Systems   Vitals: /70 (BP Location: Left arm, Patient Position: Sitting, Cuff Size: Adult Regular)   Pulse 71   Resp 14   Ht 1.575 m (5' 2\")   Wt 79.4 kg (175 lb)   BMI 32.01 kg/m    BMI= Body mass index is 32.01 kg/m .  Wt Readings from Last 3 Encounters:   11/01/21 79.4 kg (175 lb)   09/22/21 78.9 kg (174 lb)   06/29/20 82.6 kg (182 lb)       General Appearance:   no distress, overweight body habitus   ENT/Mouth: membranes moist, no oral lesions or bleeding gums.      EYES:  no scleral icterus, normal conjunctivae   Neck: no carotid bruits or thyromegaly   Chest/Lungs:   lungs are clear to auscultation, no rales or wheezing, no sternal scar, equal chest wall expansion    Cardiovascular:   Regular. Normal first and second heart sounds with no murmur. No rubs or gallops; the right carotid, radial and posterior tibial pulses are intact and the left carotid, radial and posterior tibial pulses are intact.  Jugular venous pressure is flat, mild edema bilaterally with veriscosities   Abdomen:  no organomegaly, masses, bruits, " or tenderness; bowel sounds are present   Extremities: no cyanosis or clubbing   Skin: no xanthelasma, warm.    Neurologic: normal  bilateral, no tremors     Psychiatric: alert and oriented x3, calm        Please refer above for cardiac ROS details.        Medical History  Surgical History Family History Social History   Past Medical History:   Diagnosis Date     Allergic rhinitis due to animal dander      Atrial fibrillation (H)     Xarelto     Cataract, mild-mod, ou 2015     Diagnostic skin and sensitization tests (aka ALLERGENS)     Skin test 5/5/10 pos. for:  cat/dog/T/RW     Ex-smoker      Lyme disease      [place]     Osteoporosis      Seasonal allergic rhinitis skin test 5/5/10 pos. for:  cat/dog/T/RW     Past Surgical History:   Procedure Laterality Date     C APPENDECTOMY       HYSTERECTOMY       LAPAROSCOPIC ASSISTED HYSTERECTOMY VAGINAL       Family History   Problem Relation Age of Onset     Diabetes Brother      Hypertension Brother      Asthma Daughter      Cancer No family hx of      Cerebrovascular Disease No family hx of      Thyroid Disease No family hx of      Glaucoma No family hx of      Macular Degeneration No family hx of         Social History     Socioeconomic History     Marital status:      Spouse name: Not on file     Number of children: 5     Years of education: 8     Highest education level: Not on file   Occupational History     Occupation: [employer]     Employer: [employer]     Comment: 15 years   Tobacco Use     Smoking status: Former Smoker     Packs/day: 1.00     Years: 10.00     Pack years: 10.00     Types: Cigarettes     Quit date: 2007     Years since quittin.5     Smokeless tobacco: Never Used   Substance and Sexual Activity     Alcohol use: No     Alcohol/week: 0.0 standard drinks     Drug use: No     Sexual activity: Never     Birth control/protection: Surgical     Comment: PARTIAL HYST   Other Topics Concern     Parent/sibling w/ CABG, MI or  angioplasty before 65F 55M? Not Asked   Social History Narrative     Not on file     Social Determinants of Health     Financial Resource Strain:      Difficulty of Paying Living Expenses:    Food Insecurity:      Worried About Running Out of Food in the Last Year:      Ran Out of Food in the Last Year:    Transportation Needs:      Lack of Transportation (Medical):      Lack of Transportation (Non-Medical):    Physical Activity:      Days of Exercise per Week:      Minutes of Exercise per Session:    Stress:      Feeling of Stress :    Social Connections:      Frequency of Communication with Friends and Family:      Frequency of Social Gatherings with Friends and Family:      Attends Sabianist Services:      Active Member of Clubs or Organizations:      Attends Club or Organization Meetings:      Marital Status:    Intimate Partner Violence:      Fear of Current or Ex-Partner:      Emotionally Abused:      Physically Abused:      Sexually Abused:            Medications  Allergies   Current Outpatient Medications   Medication Sig Dispense Refill     fluticasone (FLONASE) 50 MCG/ACT nasal spray Spray 1 spray into both nostrils daily 18.2 mL 0     gabapentin (NEURONTIN) 100 MG capsule TAKE 1 CAPSULE EVERY EVENING X 1 WEEK, THEN 1 TWICE DAILY X 1 WEEK THEN 1 THREE TIMES DAILY THEREAFTER 270 capsule 0     levothyroxine (SYNTHROID/LEVOTHROID) 25 MCG tablet TAKE 1 TABLET(25 MCG) BY MOUTH DAILY 90 tablet 1     metoprolol (LOPRESSOR) 25 MG tablet Take 25 mg by mouth 2 times daily        nitroGLYcerin (NITROSTAT) 0.4 MG sublingual tablet Place 1 tablet (0.4 mg) under the tongue every 5 minutes as needed for chest pain If you are still having symptoms after 3 doses (15 minutes) call 911. 25 tablet 0     rivaroxaban ANTICOAGULANT (XARELTO) 20 MG TABS tablet Take 1 tablet (20 mg) by mouth daily (with dinner)         Allergies   Allergen Reactions     Sulfa Drugs Rash          Lab Results    Chemistry/lipid CBC Cardiac  Enzymes/BNP/TSH/INR   Recent Labs   Lab Test 12/11/18  1318 02/17/16  1621 04/09/14  0831   CHOL 220*   < > 196   HDL 44*   < > 44*   *   < > 131*   TRIG 218*   < > 106   CHOLHDLRATIO  --   --  4.5    < > = values in this interval not displayed.     Recent Labs   Lab Test 12/11/18  1318 02/17/16  1621 04/09/14  0831   * 141* 131*     Recent Labs   Lab Test 06/29/20  1140      POTASSIUM 4.3   CHLORIDE 109   CO2 25   *   BUN 24   CR 0.88   GFRESTIMATED 60*   ANGELITO 9.3     Recent Labs   Lab Test 06/29/20  1140 11/26/18  1447 05/21/18  1437   CR 0.88 0.93 0.86     No results for input(s): A1C in the last 25720 hours.       Recent Labs   Lab Test 06/29/20  1140   WBC 5.2   HGB 12.3   HCT 36.5   MCV 99        Recent Labs   Lab Test 06/29/20  1140 05/01/19  1450 11/26/18  1447   HGB 12.3 12.7 12.5    No results for input(s): TROPONINI in the last 50444 hours.  Recent Labs   Lab Test 05/01/19  1450 05/21/18  1437   NTBNP 86 105     Recent Labs   Lab Test 06/29/20  1140   TSH 5.56*     No results for input(s): INR in the last 72189 hours.           cc:   Alden Conklin PA-C  [id number] [place] [address]

## 2021-11-01 NOTE — PROGRESS NOTES
"  HEART CARE ENCOUNTER CONSULTATON NOTE      [facility]  [phone]      Assessment/Recommendations   Assessment/Plan:  Possible ascending aortic and abdominal aneurysms - there is no family history of aneurysms and no personal history of hypertension. Ultrasound of the aorta shows no abd US. To put this to rest I am going to get a CTA chest, abdomen, and pelvis to look for any aneurysms/ectasia. If this looks normal no further follow up will be needed.     Atrial fibrillation - per her primary cardiology team in [place].       History of Present Illness/Subjective    HPI: Rosemarie Fry is a 86 year old female with atrial fibrillation on xarelto and metoprolol, hypothyroidism, bronchitis, sinusitis, venous insufficiency and small airway disease. She is followed at [facility] in [place]. Rosemarie comes to clinic today with her daughter to get a second opinion regarding her dilated ascending aorta and a possible abdominal aortic aneurysm. Rosemarie had a MRI spine which reported an incidental aneurysm: \"There appears to be a focal posterior-laterally directed outpouching of the right aspect of the infrarenal abdominal  aorta (series 5 image 32) concerning for abdominal aortic aneurysm. At this level, the aorta appears to measure over 3 cm in diameter. This was also present at least to some degree on the prior examination.\" A subsequent abdominal US was normal.  The daughter also notes that previous echos have reported a dilated ascending aorta of 3.9-4.0 cm reported as \"mild-moderately\" enlarged. The daughter is concerned that Rosemarie's chronic lower back pain and intermittent dizziness are related to the possible aneurysms. Rosemarie is an active woman, exercising daily and taking care of her home and yard by herself without difficulty. There is no dyspnea, chest pain, syncope. She notes rare palpitations if she forgets to take her metoprolol. The dizziness, Rosemarie relates, happens if " "she has skipped breakfast and resolves with eating. There is no pnd/orhtopnea. She has chronic venous insufficiency edema.     Recent Echocardiogram Results: 8/2019 [facility]   Normal left ventricular size and ejection fraction. Mild left ventricular hypertrophy.    Visually estimated ejection fraction is 60-65%.    Moderate left atrial enlargement.    Aortic valve sclerosis without significant stenosis. No aortic regurgitation.    Mitral annular calcification. Trace mitral regurgitation.  No significant mitral stenosis.    Aortic root measures 3.9cm, ascending aorta measures 3.9cm.    Estimated EF: 60-65%          Physical Examination  Review of Systems   Vitals: /70 (BP Location: Left arm, Patient Position: Sitting, Cuff Size: Adult Regular)   Pulse 71   Resp 14   Ht 1.575 m (5' 2\")   Wt 79.4 kg (175 lb)   BMI 32.01 kg/m    BMI= Body mass index is 32.01 kg/m .  Wt Readings from Last 3 Encounters:   11/01/21 79.4 kg (175 lb)   09/22/21 78.9 kg (174 lb)   06/29/20 82.6 kg (182 lb)       General Appearance:   no distress, overweight body habitus   ENT/Mouth: membranes moist, no oral lesions or bleeding gums.      EYES:  no scleral icterus, normal conjunctivae   Neck: no carotid bruits or thyromegaly   Chest/Lungs:   lungs are clear to auscultation, no rales or wheezing, no sternal scar, equal chest wall expansion    Cardiovascular:   Regular. Normal first and second heart sounds with no murmur. No rubs or gallops; the right carotid, radial and posterior tibial pulses are intact and the left carotid, radial and posterior tibial pulses are intact.  Jugular venous pressure is flat, mild edema bilaterally with veriscosities   Abdomen:  no organomegaly, masses, bruits, or tenderness; bowel sounds are present   Extremities: no cyanosis or clubbing   Skin: no xanthelasma, warm.    Neurologic: normal  bilateral, no tremors     Psychiatric: alert and oriented x3, calm        Please refer above for cardiac ROS " details.        Medical History  Surgical History Family History Social History   Past Medical History:   Diagnosis Date     Allergic rhinitis due to animal dander      Atrial fibrillation (H)     Xarelto     Cataract, mild-mod, ou 2015     Diagnostic skin and sensitization tests (aka ALLERGENS)     Skin test 5/5/10 pos. for:  cat/dog/T/RW     Ex-smoker      Lyme disease      Giancarlo     Osteoporosis      Seasonal allergic rhinitis skin test 5/5/10 pos. for:  cat/dog/T/RW     Past Surgical History:   Procedure Laterality Date     C APPENDECTOMY       HYSTERECTOMY       LAPAROSCOPIC ASSISTED HYSTERECTOMY VAGINAL       Family History   Problem Relation Age of Onset     Diabetes Brother      Hypertension Brother      Asthma Daughter      Cancer No family hx of      Cerebrovascular Disease No family hx of      Thyroid Disease No family hx of      Glaucoma No family hx of      Macular Degeneration No family hx of         Social History     Socioeconomic History     Marital status:      Spouse name: Not on file     Number of children: 5     Years of education: 8     Highest education level: Not on file   Occupational History     Occupation: [employer]     Employer: [employer]     Comment: 15 years   Tobacco Use     Smoking status: Former Smoker     Packs/day: 1.00     Years: 10.00     Pack years: 10.00     Types: Cigarettes     Quit date: 2007     Years since quittin.5     Smokeless tobacco: Never Used   Substance and Sexual Activity     Alcohol use: No     Alcohol/week: 0.0 standard drinks     Drug use: No     Sexual activity: Never     Birth control/protection: Surgical     Comment: PARTIAL HYST   Other Topics Concern     Parent/sibling w/ CABG, MI or angioplasty before 65F 55M? Not Asked   Social History Narrative     Not on file     Social Determinants of Health     Financial Resource Strain:      Difficulty of Paying Living Expenses:    Food Insecurity:      Worried About Running Out of Food in the  Last Year:      Ran Out of Food in the Last Year:    Transportation Needs:      Lack of Transportation (Medical):      Lack of Transportation (Non-Medical):    Physical Activity:      Days of Exercise per Week:      Minutes of Exercise per Session:    Stress:      Feeling of Stress :    Social Connections:      Frequency of Communication with Friends and Family:      Frequency of Social Gatherings with Friends and Family:      Attends Jehovah's witness Services:      Active Member of Clubs or Organizations:      Attends Club or Organization Meetings:      Marital Status:    Intimate Partner Violence:      Fear of Current or Ex-Partner:      Emotionally Abused:      Physically Abused:      Sexually Abused:            Medications  Allergies   Current Outpatient Medications   Medication Sig Dispense Refill     fluticasone (FLONASE) 50 MCG/ACT nasal spray Spray 1 spray into both nostrils daily 18.2 mL 0     gabapentin (NEURONTIN) 100 MG capsule TAKE 1 CAPSULE EVERY EVENING X 1 WEEK, THEN 1 TWICE DAILY X 1 WEEK THEN 1 THREE TIMES DAILY THEREAFTER 270 capsule 0     levothyroxine (SYNTHROID/LEVOTHROID) 25 MCG tablet TAKE 1 TABLET(25 MCG) BY MOUTH DAILY 90 tablet 1     metoprolol (LOPRESSOR) 25 MG tablet Take 25 mg by mouth 2 times daily        nitroGLYcerin (NITROSTAT) 0.4 MG sublingual tablet Place 1 tablet (0.4 mg) under the tongue every 5 minutes as needed for chest pain If you are still having symptoms after 3 doses (15 minutes) call 911. 25 tablet 0     rivaroxaban ANTICOAGULANT (XARELTO) 20 MG TABS tablet Take 1 tablet (20 mg) by mouth daily (with dinner)         Allergies   Allergen Reactions     Sulfa Drugs Rash          Lab Results    Chemistry/lipid CBC Cardiac Enzymes/BNP/TSH/INR   Recent Labs   Lab Test 12/11/18  1318 02/17/16  1621 04/09/14  0831   CHOL 220*   < > 196   HDL 44*   < > 44*   *   < > 131*   TRIG 218*   < > 106   CHOLHDLRATIO  --   --  4.5    < > = values in this interval not displayed.     Recent  Labs   Lab Test 12/11/18  1318 02/17/16  1621 04/09/14  0831   * 141* 131*     Recent Labs   Lab Test 06/29/20  1140      POTASSIUM 4.3   CHLORIDE 109   CO2 25   *   BUN 24   CR 0.88   GFRESTIMATED 60*   ANGELITO 9.3     Recent Labs   Lab Test 06/29/20  1140 11/26/18  1447 05/21/18  1437   CR 0.88 0.93 0.86     No results for input(s): A1C in the last 30151 hours.       Recent Labs   Lab Test 06/29/20  1140   WBC 5.2   HGB 12.3   HCT 36.5   MCV 99        Recent Labs   Lab Test 06/29/20  1140 05/01/19  1450 11/26/18  1447   HGB 12.3 12.7 12.5    No results for input(s): TROPONINI in the last 47565 hours.  Recent Labs   Lab Test 05/01/19  1450 05/21/18  1437   NTBNP 86 105     Recent Labs   Lab Test 06/29/20  1140   TSH 5.56*     No results for input(s): INR in the last 15993 hours.     Martha Plascencia MD

## 2021-11-01 NOTE — PATIENT INSTRUCTIONS
It was a pleasure seeing you at [facility] today.        Here are my suggestions for your care:    1. CT angiogram of the chest, abdomen and pelvis to look for aneurysms.     2. Drink a lot of water, at least 64 oz (2 liters) of water daily    3. Will call with results and recommendations      You can always call my nurse Taylor Calzada RN who is a nurse helping me in the care of my patients. She can be reached at [phone] if you have any questions.    For scheduling, please call my  Negar Cabrales at [phone].    Thank you again for trusting me with your care. Please feel free to call my office at any time if you have any question or if I can assist you in any way.    Martha Plascencia MD  [facility]

## 2021-11-20 ENCOUNTER — HEALTH MAINTENANCE LETTER (OUTPATIENT)
Age: 86
End: 2021-11-20

## 2022-01-10 ENCOUNTER — ALLIED HEALTH/NURSE VISIT (OUTPATIENT)
Dept: FAMILY MEDICINE | Facility: CLINIC | Age: 87
End: 2022-01-10
Payer: COMMERCIAL

## 2022-01-10 VITALS — DIASTOLIC BLOOD PRESSURE: 68 MMHG | SYSTOLIC BLOOD PRESSURE: 126 MMHG

## 2022-01-10 DIAGNOSIS — Z01.30 BP CHECK: Primary | ICD-10-CM

## 2022-01-10 PROCEDURE — 99207 PR DROP WITH A PROCEDURE: CPT | Performed by: PHYSICIAN ASSISTANT

## 2022-01-10 NOTE — PROGRESS NOTES
Rosemarie Fry was evaluated at [facility] on January 10, 2022 at which time her blood pressure was:    BP Readings from Last 3 Encounters:   01/10/22 126/68   11/01/21 122/70   09/22/21 138/83     Pulse Readings from Last 3 Encounters:   11/01/21 71   09/22/21 80   06/03/21 93       Reviewed lifestyle modifications for blood pressure control and reduction: including making healthy food choices, managing weight, getting regular exercise, smoking cessation, reducing alcohol consumption, monitoring blood pressure regularly.     Symptoms: None    BP Goal:< 140/90 mmHg    BP Assessment:  BP at goal    Potential Reasons for BP too high: NA - Not applicable    BP Follow-Up Plan: Recheck BP in 6 months at pharmacy    Recommendation to Provider: No changes recommended, BP at goal (patient states that BP usually runs a little low)    Note completed by: Jennifer Castillo, PharmD

## 2022-01-17 ENCOUNTER — HOSPITAL ENCOUNTER (OUTPATIENT)
Dept: CT IMAGING | Facility: HOSPITAL | Age: 87
Discharge: HOME | End: 2022-01-17
Attending: INTERNAL MEDICINE | Admitting: INTERNAL MEDICINE
Payer: COMMERCIAL

## 2022-01-17 DIAGNOSIS — I77.810 ASCENDING AORTA DILATATION (H): ICD-10-CM

## 2022-01-17 DIAGNOSIS — I71.40 ABDOMINAL AORTIC ANEURYSM (AAA) WITHOUT RUPTURE (H): ICD-10-CM

## 2022-01-17 LAB
CREAT BLD-MCNC: 1 MG/DL (ref 0.6–1.1)
GFR SERPL CREATININE-BSD FRML MDRD: 54 ML/MIN/1.73M2

## 2022-01-17 PROCEDURE — 82565 ASSAY OF CREATININE: CPT

## 2022-01-17 PROCEDURE — 71275 CT ANGIOGRAPHY CHEST: CPT

## 2022-01-17 PROCEDURE — 74174 CTA ABD&PLVS W/CONTRAST: CPT

## 2022-01-17 PROCEDURE — 250N000011 HC RX IP 250 OP 636: Performed by: INTERNAL MEDICINE

## 2022-01-17 RX ORDER — IOPAMIDOL 755 MG/ML
75 INJECTION, SOLUTION INTRAVASCULAR ONCE
Status: COMPLETED | OUTPATIENT
Start: 2022-01-17 | End: 2022-01-17

## 2022-01-17 RX ADMIN — IOPAMIDOL 75 ML: 755 INJECTION, SOLUTION INTRAVENOUS at 15:28

## 2022-01-18 ENCOUNTER — TELEPHONE (OUTPATIENT)
Dept: CARDIOLOGY | Facility: CLINIC | Age: 87
End: 2022-01-18
Payer: COMMERCIAL

## 2022-01-18 NOTE — TELEPHONE ENCOUNTER
Pt seen by EMG in 11/2021. CT imaging just completed. Provider returns on Thursday, will inform and ask for review of findings. Dr. Plascencia please review CTA findings, as it was reported as urgent review. Thank you WENDY REEVES

## 2022-01-18 NOTE — TELEPHONE ENCOUNTER
"Incoming call to notify provider of an urgent finding on the recent CTA chest/abdomen and pelvis preformed yesterday.    This report will be copied to the [facility] to ensure a provider acknowledges the finding\". This is where the call is coming from.   CTA was done yesterday 1/17/22,and is \"preliminary\" so the report has not yet been received by cardiology.CAller uncertain as to the urgent finding, but presumes it is related to the lung nodule(s) seen.    Informed caller, notification completed. Will review and send to provider. LEANDRORn    "

## 2022-01-19 LAB — RADIOLOGIST FLAGS: ABNORMAL

## 2022-01-20 DIAGNOSIS — I67.1 SACCULAR ANEURYSM: ICD-10-CM

## 2022-01-20 DIAGNOSIS — I71.40 ABDOMINAL AORTIC ANEURYSM (AAA) WITHOUT RUPTURE (H): Primary | ICD-10-CM

## 2022-01-20 NOTE — PROGRESS NOTES
===View-only below this line===  ----- Message -----  From: Martha Plascencia MD  Sent: 1/20/2022   9:46 AM CST  To: Erich Calzada RN    I just spoke with Ms. Fry on the phone and went over her results.  I need your help with a couple of things...    1. I would like a repeat CTA abdomen in 6 months to make sure the saccular aneurysm isnt growing.     2. Please get this study report to her PCP, shiva Conklin. He will need to get her set up for the recommended follow up images on the CT report (MRI kidney and left ovarian ultrascound.     Thank you, EG    Order placed for repeat CTA of abdomen in 6 months. Routed results to PCP. CMM,Rn

## 2022-02-25 ENCOUNTER — TELEPHONE (OUTPATIENT)
Dept: FAMILY MEDICINE | Facility: CLINIC | Age: 87
End: 2022-02-25
Payer: COMMERCIAL

## 2022-02-25 DIAGNOSIS — K04.7 TOOTH ABSCESS: Primary | ICD-10-CM

## 2022-02-25 RX ORDER — AMOXICILLIN 875 MG
875 TABLET ORAL 2 TIMES DAILY
Qty: 20 TABLET | Refills: 0 | Status: SHIPPED | OUTPATIENT
Start: 2022-02-25 | End: 2022-03-07

## 2022-02-25 NOTE — TELEPHONE ENCOUNTER
Spoke with DILLON Key and informed her of RX.  Yesi verbalized understanding and was appreciative of call

## 2022-02-25 NOTE — TELEPHONE ENCOUNTER
Patient seen at dentist and has a couple abscess teeth that need to be pulled. Requesting antibiotic to be sent to [facility] in [place].

## 2022-02-28 ENCOUNTER — OFFICE VISIT (OUTPATIENT)
Dept: FAMILY MEDICINE | Facility: CLINIC | Age: 87
End: 2022-02-28
Payer: COMMERCIAL

## 2022-02-28 VITALS
BODY MASS INDEX: 31.72 KG/M2 | HEART RATE: 106 BPM | DIASTOLIC BLOOD PRESSURE: 80 MMHG | SYSTOLIC BLOOD PRESSURE: 118 MMHG | OXYGEN SATURATION: 97 % | WEIGHT: 173.4 LBS | TEMPERATURE: 98.5 F

## 2022-02-28 DIAGNOSIS — J31.0 CHRONIC RHINITIS: ICD-10-CM

## 2022-02-28 DIAGNOSIS — M54.50 BILATERAL LOW BACK PAIN WITHOUT SCIATICA, UNSPECIFIED CHRONICITY: ICD-10-CM

## 2022-02-28 DIAGNOSIS — R39.89 URINARY PROBLEM: Primary | ICD-10-CM

## 2022-02-28 DIAGNOSIS — J32.9 SINUSITIS, UNSPECIFIED CHRONICITY, UNSPECIFIED LOCATION: ICD-10-CM

## 2022-02-28 LAB
ALBUMIN UR-MCNC: NEGATIVE MG/DL
APPEARANCE UR: CLEAR
BACTERIA #/AREA URNS HPF: ABNORMAL /HPF
BILIRUB UR QL STRIP: NEGATIVE
COLOR UR AUTO: YELLOW
GLUCOSE UR STRIP-MCNC: NEGATIVE MG/DL
HGB UR QL STRIP: ABNORMAL
KETONES UR STRIP-MCNC: NEGATIVE MG/DL
LEUKOCYTE ESTERASE UR QL STRIP: ABNORMAL
NITRATE UR QL: NEGATIVE
PH UR STRIP: 5 [PH] (ref 5–7)
RBC #/AREA URNS AUTO: ABNORMAL /HPF
SP GR UR STRIP: 1.02 (ref 1–1.03)
SQUAMOUS #/AREA URNS AUTO: ABNORMAL /LPF
UROBILINOGEN UR STRIP-ACNC: 0.2 E.U./DL
WBC #/AREA URNS AUTO: ABNORMAL /HPF

## 2022-02-28 PROCEDURE — 99214 OFFICE O/P EST MOD 30 MIN: CPT | Performed by: NURSE PRACTITIONER

## 2022-02-28 PROCEDURE — 81001 URINALYSIS AUTO W/SCOPE: CPT | Performed by: NURSE PRACTITIONER

## 2022-02-28 ASSESSMENT — ENCOUNTER SYMPTOMS
NERVOUS/ANXIOUS: 0
SINUS PAIN: 1
DYSPHORIC MOOD: 0
RHINORRHEA: 1
NUMBNESS: 0
HEADACHES: 1
COUGH: 0
SLEEP DISTURBANCE: 0
ABDOMINAL PAIN: 0
DIARRHEA: 0
NAUSEA: 0
VOMITING: 0
PALPITATIONS: 0
ABDOMINAL DISTENTION: 0
FATIGUE: 0
SORE THROAT: 0
BACK PAIN: 1
DIZZINESS: 0
WHEEZING: 0
LIGHT-HEADEDNESS: 0
CONSTIPATION: 0
ARTHRALGIAS: 0
SINUS PRESSURE: 1
SHORTNESS OF BREATH: 0
CHEST TIGHTNESS: 0
MYALGIAS: 0

## 2022-02-28 ASSESSMENT — PAIN SCALES - GENERAL: PAINLEVEL: NO PAIN (0)

## 2022-02-28 NOTE — PROGRESS NOTES
"  Assessment & Plan     Urinary problem  Urine unremarkable.  - UA with Microscopic reflex to Culture - Clinic Collect  - Urine Microscopic    Chronic rhinitis  We will refer to ENT  - Otolaryngology Referral; Future    Sinusitis, unspecified chronicity, unspecified location  Reviewed treatment plan and role of antibiotics  Instructed to call or return for :  --symptoms not improved in 3 days  --Worsening fever or headaches  --new, unexplained symptoms develop  - amoxicillin-clavulanate (AUGMENTIN) 875-125 MG tablet; Take 1 tablet by mouth 2 times daily    Bilateral low back pain without sciatica, unspecified chronicity  Previous MRI reviewed.  Chart review completed.  We will refer to pain management.  - Pain Management Referral; Future    Review of external notes as documented elsewhere in note  Review of the result(s) of each unique test - Labs, imaging  Prescription drug management  26 minutes spent on the date of the encounter doing chart review, history and exam, documentation and further activities per the note     BMI:   Estimated body mass index is 31.72 kg/m  as calculated from the following:    Height as of 11/1/21: 1.575 m (5' 2\").    Weight as of this encounter: 78.7 kg (173 lb 6.4 oz).     No follow-ups on file.    TONY Ceja CNP  [facility] LIZ [place] is a 87 year old who presents for the following health issues     HPI     1. Back pain follow up. No improvement. Coughing makes back pain worse. Complaining of dark urine x4 weeks on/off.   2. Was told by dentist that she has a sinus infection.  Abscesses in teeth.       Here today with daughter whom helps to care for her.    Back pain that comes and goes for a few months. Coughing make back pain worse. No SOB. Cough will be productive at times. No fevers or chills. Lower back pain. No pain to buttock or backs of legs. Does ride bike at home- stationary. If stands to cook or do dishes will have a lot of back. " Will stand for 5 minutes and then back will hurt. Has had prescription for predniosne in the past and that does help. Does take 2 tylenol at night to help decrease pain, will occasionally take 2 during the day as well. Has had injections in the past. First injection did help for a few months, then second one worked for about 1 month    History of sinus infection.  Was recently seen by dentist.  Told that has sinus infection and needed to follow-up with PCP for treatment.  Is having pressure to face, behind eyes and headache.  No recent fevers or chills.  No home over-the-counter treatments.  Has not been around anyone else who is ill that is aware of.    Has a runny nose, all the time.  Clear drainage.    Review of Systems   Constitutional: Negative for fatigue.   HENT: Positive for rhinorrhea, sinus pressure and sinus pain. Negative for ear pain and sore throat.    Eyes: Negative for visual disturbance.   Respiratory: Negative for cough, chest tightness, shortness of breath and wheezing.    Cardiovascular: Negative for chest pain and palpitations.   Gastrointestinal: Negative for abdominal distention, abdominal pain, constipation, diarrhea, nausea and vomiting.   Endocrine: Negative for cold intolerance and heat intolerance.   Musculoskeletal: Positive for back pain (Lower). Negative for arthralgias and myalgias.   Skin: Negative for rash.   Neurological: Positive for headaches. Negative for dizziness, light-headedness and numbness.   Psychiatric/Behavioral: Negative for dysphoric mood and sleep disturbance. The patient is not nervous/anxious.           Objective    /80 (BP Location: Left arm, Cuff Size: Adult Large)   Pulse 106   Temp 98.5  F (36.9  C) (Tympanic)   Wt 78.7 kg (173 lb 6.4 oz)   SpO2 97%   BMI 31.72 kg/m    Body mass index is 31.72 kg/m .  Physical Exam  Constitutional:       Appearance: Normal appearance. She is well-developed.   HENT:      Head: Normocephalic and atraumatic.      Right  Ear: Tympanic membrane and external ear normal. No middle ear effusion.      Left Ear: Tympanic membrane and external ear normal.  No middle ear effusion.      Nose: Rhinorrhea present. No mucosal edema.      Right Sinus: Maxillary sinus tenderness present.      Left Sinus: Maxillary sinus tenderness present.   Neck:      Thyroid: No thyromegaly.      Vascular: No carotid bruit.   Cardiovascular:      Rate and Rhythm: Normal rate and regular rhythm.      Heart sounds: Normal heart sounds.   Pulmonary:      Effort: Pulmonary effort is normal.      Breath sounds: Normal breath sounds.   Abdominal:      General: Bowel sounds are normal.      Palpations: Abdomen is soft.   Musculoskeletal:        Back:    Skin:     General: Skin is warm and dry.   Neurological:      Mental Status: She is alert.   Psychiatric:         Behavior: Behavior normal.

## 2022-03-19 ENCOUNTER — OFFICE VISIT (OUTPATIENT)
Dept: OTHER | Facility: CLINIC | Age: 87
End: 2022-03-19
Payer: COMMERCIAL

## 2022-03-19 VITALS
SYSTOLIC BLOOD PRESSURE: 128 MMHG | TEMPERATURE: 98.3 F | HEIGHT: 62 IN | WEIGHT: 171 LBS | BODY MASS INDEX: 31.47 KG/M2 | RESPIRATION RATE: 16 BRPM | HEART RATE: 78 BPM | DIASTOLIC BLOOD PRESSURE: 78 MMHG | OXYGEN SATURATION: 98 %

## 2022-03-19 DIAGNOSIS — Z00.00 ENCOUNTER FOR MEDICARE ANNUAL WELLNESS EXAM: Primary | ICD-10-CM

## 2022-03-19 PROCEDURE — G0439 PPPS, SUBSEQ VISIT: HCPCS | Performed by: FAMILY MEDICINE

## 2022-03-19 ASSESSMENT — PAIN SCALES - GENERAL: PAINLEVEL: NO PAIN (0)

## 2022-03-19 ASSESSMENT — ACTIVITIES OF DAILY LIVING (ADL): CURRENT_FUNCTION: NO ASSISTANCE NEEDED

## 2022-03-19 NOTE — PROGRESS NOTES
"Assessment & Plan     ICD-10-CM    1. Encounter for Medicare annual wellness exam  Z00.00        Follow Up/Next Steps  Return in about 53 weeks (around 3/25/2023) for Annual Wellness Visit.  Recommended that patient follow up with PCP for preventative care and care of chronic conditions. Client feels that sinusitis is not treated completely with the amoxicillin. She wants another antibiotic    Counseling and Education  Reviewed preventive health counseling, as reflected in patient instructions      BMI:   Estimated body mass index is 31.28 kg/m  as calculated from the following:    Height as of this encounter: 1.575 m (5' 2\").    Weight as of this encounter: 77.6 kg (171 lb).   Weight management plan: Discussed healthy diet and exercise guidelines Client states that she has started to lose weight by eating better.       Appropriate preventive services were discussed with this patient, including applicable screening as appropriate for cardiovascular disease, diabetes, osteopenia/osteoporosis, and glaucoma.  As appropriate for age/gender, discussed screening for colorectal cancer, prostate cancer, breast cancer, and cervical cancer. Checklist reviewing preventive services available has been given to the patient.    Reviewed patients plan of care and provided an AVS. The Basic Care Plan (routine screening as documented in Health Maintenance) for Rosemarie meets the Care Plan requirement. This Care Plan has been established and reviewed with the Patient.    Visit Provider: Melissa Iglesias RN  Supervising Provider: Mindy Lane MD, MD  [facility]       Subjective   Rosemarie is a 87 year old who is being seen for an Annual Wellness Visit     Healthy Habits:     In general, how would you rate your overall health?  Good    Frequency of exercise:  1 day/week    Duration of exercise:  15-30 minutes    Do you usually eat at least 4 servings of fruit and vegetables a day, include whole " "grains    & fiber and avoid regularly eating high fat or \"junk\" foods?  No    Taking medications regularly:  Yes    Medication side effects:  None    Ability to successfully perform activities of daily living:  No assistance needed    Home Safety:  No safety concerns identified    Hearing Impairment:  Difficulty following a conversation in a noisy restaurant or crowded room, difficulty following dialogue in the theater, difficult to understand a speaker at a public meeting or Denominational service and need to ask people to speak up or repeat themselves    In the past 6 months, have you been bothered by leaking of urine? Yes    In general, how would you rate your overall mental or emotional health?  Good      PHQ-2 Total Score: 0    Additional concerns today:  No    Do you feel safe in your environment? Yes    Have you ever done Advance Care Planning? (For example, a Health Directive, POLST, or a discussion with a medical provider or your loved ones about your wishes): Yes, advance care planning is on file.    Fall risk  Fallen 2 or more times in the past year?: No  Any fall with injury in the past year?: No  Cognitive Screening NORMAL  Recalls 3 objects.   :  Mini-CogTM Copyright MOHINDER Leblanc. Licensed by the author for use in [facility]; reprinted with permission (lise@[facility]). All rights reserved.      Do you have sleep apnea, excessive snoring or daytime drowsiness?: no    Reviewed and updated as needed this visit by clinical staff   Tobacco  Allergies  Meds  Problems  Med Hx  Surg Hx  Fam Hx  Soc   Hx        Social History     Tobacco Use     Smoking status: Former Smoker     Packs/day: 1.00     Years: 10.00     Pack years: 10.00     Types: Cigarettes     Quit date: 2007     Years since quittin.9     Smokeless tobacco: Never Used   Substance Use Topics     Alcohol use: No     Alcohol/week: 0.0 standard drinks       Current providers sharing in care for this patient include:   Patient Care " "Team:  Alden Conklin PA-C as PCP - General (Physician Assistant)  Dia Hartman MD as MD (Vascular Surgery)  Alden Conklin PA-C as Assigned PCP  Conor Luevano MD as MD (Internal Medicine)  Martha Plascencia MD as Assigned Heart and Vascular Provider    The following health maintenance items are reviewed in Epic and correct as of today:  Health Maintenance Due   Topic Date Due     ZOSTER IMMUNIZATION (1 of 2) Never done     DTAP/TDAP/TD IMMUNIZATION (2 - Td or Tdap) 08/26/2018     ADVANCE CARE PLANNING  05/26/2020     TSH W/FREE T4 REFLEX  06/29/2021       Mammogram Screening - Patient over age 75, has elected to discontinue screenings.  Pertinent mammograms are reviewed under the imaging tab.        Objective    /78 (BP Location: Right arm)   Pulse 78   Temp 98.3  F (36.8  C)   Resp 16   Ht 1.575 m (5' 2\")   Wt 77.6 kg (171 lb)   SpO2 98%   BMI 31.28 kg/m   Estimated body mass index is 31.28 kg/m  as calculated from the following:    Height as of this encounter: 1.575 m (5' 2\").    Weight as of this encounter: 77.6 kg (171 lb).  Physical Exam  Patient appears comfortable and in no acute distress.        Identified Health Risks:amoxicillian refill  "

## 2022-03-20 NOTE — PATIENT INSTRUCTIONS
Patient Education   Personalized Prevention Plan  You are due for the preventive services outlined below.  Your care team is available to assist you in scheduling these services.  If you have already completed any of these items, please share that information with your care team to update in your medical record.  Health Maintenance Due   Topic Date Due     Zoster (Shingles) Vaccine (1 of 2) Never done     Diptheria Tetanus Pertussis (DTAP/TDAP/TD) Vaccine (2 - Td or Tdap) 08/26/2018     Discuss Advance Care Planning  05/26/2020     Thyroid Function Lab  06/29/2021

## 2022-04-20 ENCOUNTER — TELEPHONE (OUTPATIENT)
Dept: FAMILY MEDICINE | Facility: CLINIC | Age: 87
End: 2022-04-20
Payer: COMMERCIAL

## 2022-04-20 NOTE — TELEPHONE ENCOUNTER
Called patient and relayed the message below. Nurse advised of [facility]. She will have her daughter take her there.    Negar Bates RN

## 2022-04-20 NOTE — TELEPHONE ENCOUNTER
"Spoke with patient, she is having \"tooth pain\" since 3 am, in lower jaw  Unable to get to dentist until Monday.  Pain is constant, rated 8/9 of 10.  She has been drinking water, not eating solids today.  Tylenol is not working. Does have a headache.  No swelling, no sinus pain, no redness, no fever, no bleeding.  Asking if can have some pain medication or does she need an antibiotic to try?  Please advise,  Jory Yadav RN  [facility]      "

## 2022-04-20 NOTE — TELEPHONE ENCOUNTER
Daughter is calling requesting a call from a nurse to call patient regarding the pain she is in from her teeth.  Daughter made an appointment to have teeth removed on Monday but patient is in a lot of pain right now.     Can call at     Dulce Smith-  Saud Cárdenas

## 2022-04-20 NOTE — TELEPHONE ENCOUNTER
Daughter calling regarding message. Did offer telephone visit with provider. Patient does not have any electronic devices. Did inform daughter RN will contact patient.

## 2022-04-23 ENCOUNTER — MYC MEDICAL ADVICE (OUTPATIENT)
Dept: FAMILY MEDICINE | Facility: CLINIC | Age: 87
End: 2022-04-23

## 2022-04-23 ENCOUNTER — HOSPITAL ENCOUNTER (EMERGENCY)
Facility: HOSPITAL | Age: 87
Discharge: HOME | End: 2022-04-23
Attending: EMERGENCY MEDICINE | Admitting: EMERGENCY MEDICINE
Payer: COMMERCIAL

## 2022-04-23 ENCOUNTER — APPOINTMENT (OUTPATIENT)
Dept: RADIOLOGY | Facility: HOSPITAL | Age: 87
End: 2022-04-23
Attending: STUDENT IN AN ORGANIZED HEALTH CARE EDUCATION/TRAINING PROGRAM
Payer: COMMERCIAL

## 2022-04-23 VITALS
TEMPERATURE: 99.5 F | SYSTOLIC BLOOD PRESSURE: 110 MMHG | OXYGEN SATURATION: 94 % | RESPIRATION RATE: 26 BRPM | WEIGHT: 165 LBS | DIASTOLIC BLOOD PRESSURE: 60 MMHG | HEART RATE: 84 BPM | BODY MASS INDEX: 30.18 KG/M2

## 2022-04-23 DIAGNOSIS — R79.89 ELEVATED BRAIN NATRIURETIC PEPTIDE (BNP) LEVEL: ICD-10-CM

## 2022-04-23 DIAGNOSIS — R06.00 DYSPNEA, UNSPECIFIED TYPE: ICD-10-CM

## 2022-04-23 DIAGNOSIS — U07.1 INFECTION DUE TO 2019 NOVEL CORONAVIRUS: ICD-10-CM

## 2022-04-23 DIAGNOSIS — Z79.01 ANTICOAGULATED BY ANTICOAGULATION TREATMENT: ICD-10-CM

## 2022-04-23 DIAGNOSIS — I48.0 PAROXYSMAL ATRIAL FIBRILLATION (H): ICD-10-CM

## 2022-04-23 LAB
ANION GAP SERPL CALCULATED.3IONS-SCNC: 11 MMOL/L (ref 5–18)
BNP SERPL-MCNC: 818 PG/ML (ref 0–167)
BUN SERPL-MCNC: 23 MG/DL (ref 8–28)
CALCIUM SERPL-MCNC: 9.7 MG/DL (ref 8.5–10.5)
CHLORIDE BLD-SCNC: 107 MMOL/L (ref 98–107)
CO2 SERPL-SCNC: 22 MMOL/L (ref 22–31)
CREAT SERPL-MCNC: 1.01 MG/DL (ref 0.6–1.1)
D DIMER PPP FEU-MCNC: 0.32 UG/ML FEU (ref 0–0.5)
ERYTHROCYTE [DISTWIDTH] IN BLOOD BY AUTOMATED COUNT: 14 % (ref 10–15)
FLUAV RNA SPEC QL NAA+PROBE: NEGATIVE
FLUBV RNA RESP QL NAA+PROBE: NEGATIVE
GFR SERPL CREATININE-BSD FRML MDRD: 54 ML/MIN/1.73M2
GLUCOSE BLD-MCNC: 113 MG/DL (ref 70–125)
HCT VFR BLD AUTO: 38.2 % (ref 35–47)
HGB BLD-MCNC: 12.9 G/DL (ref 11.7–15.7)
MCH RBC QN AUTO: 33 PG (ref 26.5–33)
MCHC RBC AUTO-ENTMCNC: 33.8 G/DL (ref 31.5–36.5)
MCV RBC AUTO: 98 FL (ref 78–100)
PLATELET # BLD AUTO: 226 10E3/UL (ref 150–450)
POTASSIUM BLD-SCNC: 4.1 MMOL/L (ref 3.5–5)
RBC # BLD AUTO: 3.91 10E6/UL (ref 3.8–5.2)
SARS-COV-2 RNA RESP QL NAA+PROBE: POSITIVE
SODIUM SERPL-SCNC: 140 MMOL/L (ref 136–145)
TROPONIN I SERPL-MCNC: <0.01 NG/ML (ref 0–0.29)
WBC # BLD AUTO: 9.8 10E3/UL (ref 4–11)

## 2022-04-23 PROCEDURE — 36415 COLL VENOUS BLD VENIPUNCTURE: CPT | Performed by: STUDENT IN AN ORGANIZED HEALTH CARE EDUCATION/TRAINING PROGRAM

## 2022-04-23 PROCEDURE — 87636 SARSCOV2 & INF A&B AMP PRB: CPT | Performed by: EMERGENCY MEDICINE

## 2022-04-23 PROCEDURE — 80048 BASIC METABOLIC PNL TOTAL CA: CPT | Performed by: EMERGENCY MEDICINE

## 2022-04-23 PROCEDURE — C9803 HOPD COVID-19 SPEC COLLECT: HCPCS

## 2022-04-23 PROCEDURE — 80048 BASIC METABOLIC PNL TOTAL CA: CPT | Performed by: STUDENT IN AN ORGANIZED HEALTH CARE EDUCATION/TRAINING PROGRAM

## 2022-04-23 PROCEDURE — 99285 EMERGENCY DEPT VISIT HI MDM: CPT | Mod: 25

## 2022-04-23 PROCEDURE — 71046 X-RAY EXAM CHEST 2 VIEWS: CPT

## 2022-04-23 PROCEDURE — 93005 ELECTROCARDIOGRAM TRACING: CPT | Performed by: EMERGENCY MEDICINE

## 2022-04-23 PROCEDURE — 250N000013 HC RX MED GY IP 250 OP 250 PS 637: Performed by: EMERGENCY MEDICINE

## 2022-04-23 PROCEDURE — 85027 COMPLETE CBC AUTOMATED: CPT | Performed by: EMERGENCY MEDICINE

## 2022-04-23 PROCEDURE — 85379 FIBRIN DEGRADATION QUANT: CPT | Performed by: EMERGENCY MEDICINE

## 2022-04-23 PROCEDURE — 84484 ASSAY OF TROPONIN QUANT: CPT | Performed by: EMERGENCY MEDICINE

## 2022-04-23 PROCEDURE — 36415 COLL VENOUS BLD VENIPUNCTURE: CPT | Performed by: EMERGENCY MEDICINE

## 2022-04-23 PROCEDURE — 85027 COMPLETE CBC AUTOMATED: CPT | Performed by: STUDENT IN AN ORGANIZED HEALTH CARE EDUCATION/TRAINING PROGRAM

## 2022-04-23 PROCEDURE — 87636 SARSCOV2 & INF A&B AMP PRB: CPT | Performed by: STUDENT IN AN ORGANIZED HEALTH CARE EDUCATION/TRAINING PROGRAM

## 2022-04-23 PROCEDURE — 83880 ASSAY OF NATRIURETIC PEPTIDE: CPT | Performed by: EMERGENCY MEDICINE

## 2022-04-23 RX ORDER — METOPROLOL SUCCINATE 25 MG/1
25 TABLET, EXTENDED RELEASE ORAL DAILY
COMMUNITY
End: 2022-07-06

## 2022-04-23 RX ORDER — AMOXICILLIN 500 MG/1
500 CAPSULE ORAL 3 TIMES DAILY
COMMUNITY
End: 2022-07-06

## 2022-04-23 RX ORDER — B-COMPLEX WITH VITAMIN C
1 TABLET ORAL DAILY
COMMUNITY
End: 2024-05-20

## 2022-04-23 RX ORDER — LEVOTHYROXINE SODIUM 50 UG/1
50 TABLET ORAL DAILY
COMMUNITY
End: 2022-08-08

## 2022-04-23 RX ORDER — ACETAMINOPHEN 325 MG/1
325 TABLET ORAL EVERY 6 HOURS PRN
COMMUNITY

## 2022-04-23 RX ORDER — METOPROLOL SUCCINATE 25 MG/1
25 TABLET, EXTENDED RELEASE ORAL ONCE
Status: COMPLETED | OUTPATIENT
Start: 2022-04-23 | End: 2022-04-23

## 2022-04-23 RX ADMIN — RIVAROXABAN 20 MG: 10 TABLET, FILM COATED ORAL at 15:45

## 2022-04-23 RX ADMIN — METOPROLOL SUCCINATE 25 MG: 25 TABLET, EXTENDED RELEASE ORAL at 15:45

## 2022-04-23 ASSESSMENT — ENCOUNTER SYMPTOMS
HEADACHES: 1
CHILLS: 1
COUGH: 1
LIGHT-HEADEDNESS: 1
SHORTNESS OF BREATH: 1
FATIGUE: 1

## 2022-04-23 NOTE — ED NOTES
Called Patient's daughter Yesi per patient request. Gave her information regarding her mother's health.

## 2022-04-23 NOTE — ED TRIAGE NOTES
Pt reports sob, bodyaches ,HA, since Thursday. Daughter who recently tested covid positive, tested the pt last night, she was also positive. Pt notes an episode of dizziness while in the shower this morning , now resolved. Pt has been immunized and booster for covid. Pt reports that her daughter has been staying with her for the past 3 days

## 2022-04-23 NOTE — DISCHARGE INSTRUCTIONS
Read and follow the discharge instructions.    Continue your current medications.    Your BNP a medication that checks for congestion of the heart is elevated.  After discussion with the cardiologist we are now going to start you on water pill because we do not want you to go to dehydrated.  Take lots of liquids stay well-hydrated avoid salty foods.    I am referring you to the cardiologist they should be giving you a call to be seen within 10 days.    Use the pulse ox given.  If you are satting 90% while you are sitting around return to the emergency department or if you feeling short of breath regardless of what the number is return.    After discussion with the pharmacist that Xarelto is a contraindication to the Paxlovid.  But discuss his with your caregiver on Monday.  Return immediately or call 911 if you having chest pain, shortness of breath, feeling fainting, or any other concerns.

## 2022-04-23 NOTE — ED PROVIDER NOTES
EMERGENCY DEPARTMENT ENCOUNTER      NAME: Rosemarie Fry  AGE: 87 year old female  YOB: 1934  MRN: [id number]  EVALUATION DATE & TIME: 4/23/2022 12:41 PM    PCP: Alden Conklin    ED PROVIDER: Vanesa Frankel M.D.      CHIEF COMPLAINT     Chief Complaint   Patient presents with     Shortness of Breath     Home test positive for covid yesterday         FINAL IMPRESSION:     1. Infection due to 2019 novel coronavirus    2. Dyspnea, unspecified type    3. Elevated brain natriuretic peptide (BNP) level    4. Paroxysmal atrial fibrillation (H)    5. Anticoagulated by anticoagulation treatment          MEDICAL DECISION MAKING:       Pertinent Labs & Imaging studies reviewed. (See chart for details)    87 year old female presents to the Emergency Department for evaluation of breath positive for COVID    ED Course as of 04/23/22 1825   Sat Apr 23, 2022   1317 Mrs. Fry is a 87-year-old female presents here dropped off by her daughter.  She tested positive for COVID so she is exhausted feels fainting has a cough headache sometimes chest pain sometimes shortness of breath and chills.  She has been vaccinated.   1317 On examination nontoxic appearing cooperative and pleasant.  She is tachycardic has had a history either A. fib or flutter she does not know when she takes metoprolol for that and she had not taken her morning medications.  She is also anticoagulated on Xarelto had not taken that today.   1318 Occasional crackle on auscultation but otherwise good breath sounds abdomen is soft no lower extremity edema.   1318 And is tachycardic but likely because she did not take her metoprolol she is currently not afebrile differential diagnosis include but not limited to COVID-pneumonia PE congestive heart failure electrolyte abnormalities anemia bacterial sepsis, and others.   1318 We will start an IV.  We will patient postop cardiac and blood pressure monitor.  We will ambulate patient to see if she is  requiring oxygen while exerting herself.   1407 Patient medications verified by pharmacy she did not take her metoprolol or her Xarelto.  We will give today.  Patient ambulated in the room sats decreased to 90%-91%   1524 Sodium: 140   1525 Hemoglobin: 12.9   1803 After metoprolol patient's tachycardia resolved.     1804 X-ray without consolidation or pulmonary edema.  Negative troponin normal white blood cell count and hemoglobin.  COVID-positive with normal renal function.   1804 D-dimer within normal limits no risk factor for pulmonary embolism she is anticoagulated therefore I think this is lower likelihood.   1804 BNP is elevated.  She had a echo in 2021 and unable to access it but in 2018 she had a normal ejection fracture.   1804 Clinically she has no evidence of volume overload no lower extremity edema no crackles on auscultation no JVD.   1804 Her MASSB score is 7.  Consulted with pharmacy Xarelto as one of the contraindications would not want her to stop Xarelto given that she is in and out of atrial fibrillation and this is a risk factor for CVA.  Also somewhat protective in the setting of COVID.   1805 Spoke with cardiology regarding elevated BNP.  This moment no diuretics were recommended.  Due to recommended rapid access referral within 10 days given recent COVID infection.   1806 Spoke with patient in length with her daughter present she feels comfortable being discharged.  They were given a pulse ox.  Strict discharge instructions and return precautions were given.  Daughter also positive for COVID she was prescribed Paxlovid   1806 Clinical impression and decision making     1807 87-year-old female with recent diagnosis of COVID vaccinated and boosted presents here with shortness of breath headache body aches.  Initially tachycardic history of atrial fibrillation has not taken her metoprolol.  No hypoxic episodes even with exertion baseline history of COPD.  Nontoxic.  On examination.       Vital  Signs: Tachycardic  EKG: Atrial flutter variable block left anterior fascicular block LVH.  Imaging: Chest x-ray no acute  Home Meds: Reviewed  ED meds/abx:  Fluids: none    Labs  K 4.1  Cr 1.01  Wbc 9.8  Hgb 12.9  Platelets 226  D-dimer 0.32 troponin negative       Review of Previous Records  Echo 2018  Interpretation Summary     Left ventricular function, chamber size, wall motion, and wall thickness are  normal.The EF is 60-65%.  Abnormal diastolic function with normal filling pressures at rest and IVC is  not dilated.  Trileaflet aortic sclerosis without stenosis.  Mild to moderate dilatation of the ascending aorta 4.1 cm.  _____________________________________________________________________________      Consults  Cardiologist, Dr. Barr    ED COURSE     1:03 PM Met with patient for initial interview and exam. Discussed initial plan for care for their stay in the emergency department.    2:16 PM reevaluated and updated  35:28 PM related sats 91 to 92%.    5:28 PM daughter at bedside patient drinking soup.  In no distress.    5:32 PM Spoke with Dr. Barr cardiologist regarding patient plan of care.    5:38 PM Consulted with pharmacist.  Xarelto is a contraindication to Paxil available    5:45 PM We discussed the plan for discharge and the patient is agreeable. Reviewed supportive cares, symptomatic treatment, outpatient follow up, and reasons to return to the Emergency Department. All questions and concerns were addressed. Patient to be discharged by ED RN.     At the conclusion of the encounter I discussed the results of all of the tests and the disposition. The questions were answered. The patient and daughter  acknowledged understanding and was agreeable with the care plan.           MEDICATIONS GIVEN IN THE EMERGENCY:     Medications   metoprolol succinate ER (TOPROL-XL) 24 hr tablet 25 mg (25 mg Oral Given 4/23/22 3975)   rivaroxaban ANTICOAGULANT (XARELTO) tablet 20 mg (20 mg Oral Given 4/23/22  1545)       NEW PRESCRIPTIONS STARTED AT TODAY'S ER VISIT     New Prescriptions    No medications on file          =================================================================    HPI     Patient information was obtained from: patient    Use of : N/A      Rosemarie Fry is a 87 year old female who presents by walk in for evaluation of shortness of breath.    Patient has a history of ascending aorta dilation, chronic atrial fibrillation on Xarelto and metoprolol, COPD, and Lyme disease.    Patient reports that she began feeling unwell on Thursday (4/21/2022).    She endorses cough, chills, occasional chest pain, shortness of breath, and headaches.     She endorses feeling tired and lightheaded this morning.     Her family had her do a home COVID test, which returned positive. She is fully vaccinated and boosted against COVID but cannot recall which vaccine she received.    She is not take her metoprolol or Xarelto this morning. Patient did not fall today.    Otherwise denies any other complaints.    REVIEW OF SYSTEMS   Review of Systems   Constitutional: Positive for chills and fatigue.   Respiratory: Positive for cough and shortness of breath.    Cardiovascular: Positive for chest pain (occasional).   Neurological: Positive for light-headedness and headaches.   All other systems reviewed and are negative.    PAST MEDICAL HISTORY:     Past Medical History:   Diagnosis Date     Allergic rhinitis due to animal dander      Atrial fibrillation (H)     Xarelto     Cataract, mild-mod, ou 5/17/2015     Diagnostic skin and sensitization tests (aka ALLERGENS)     Skin test 5/5/10 pos. for:  cat/dog/T/RW     Ex-smoker      Lyme disease 1999     Giancarlo     Osteoporosis      Seasonal allergic rhinitis skin test 5/5/10 pos. for:  cat/dog/T/RW       PAST SURGICAL HISTORY:     Past Surgical History:   Procedure Laterality Date     HYSTERECTOMY       LAPAROSCOPIC ASSISTED HYSTERECTOMY VAGINAL       ZZC APPENDECTOMY          CURRENT MEDICATIONS:   acetaminophen (TYLENOL) 325 MG tablet  amoxicillin (AMOXIL) 500 MG capsule  calcium-vitamin D-vitamin K (VIACTIV) 500-500-40 MG-UNT-MCG CHEW  levothyroxine (SYNTHROID/LEVOTHROID) 50 MCG tablet  metoprolol succinate ER (TOPROL-XL) 25 MG 24 hr tablet  nitroGLYcerin (NITROSTAT) 0.4 MG sublingual tablet  rivaroxaban ANTICOAGULANT (XARELTO) 20 MG TABS tablet  vitamin B-2 (RIBOFLAVIN) 25 MG TABS tablet       ALLERGIES:     Allergies   Allergen Reactions     Sulfa Drugs Rash       FAMILY HISTORY:     Family History   Problem Relation Age of Onset     Diabetes Brother      Hypertension Brother      Asthma Daughter      Cancer No family hx of      Cerebrovascular Disease No family hx of      Thyroid Disease No family hx of      Glaucoma No family hx of      Macular Degeneration No family hx of        SOCIAL HISTORY:     Social History     Socioeconomic History     Marital status:      Number of children: 5     Years of education: 8   Occupational History     Occupation: [employer]     Employer: [employer]     Comment: 15 years   Tobacco Use     Smoking status: Former Smoker     Packs/day: 1.00     Years: 10.00     Pack years: 10.00     Types: Cigarettes     Quit date: 4/12/2007     Years since quitting: 15.0     Smokeless tobacco: Never Used   Vaping Use     Vaping Use: Never used   Substance and Sexual Activity     Alcohol use: No     Alcohol/week: 0.0 standard drinks     Drug use: No     Sexual activity: Never     Birth control/protection: Surgical     Comment: PARTIAL HYST       VITALS:   /60   Pulse 84   Temp 99.5  F (37.5  C)   Resp 26   Wt 74.8 kg (165 lb)   SpO2 94%   BMI 30.18 kg/m      PHYSICAL EXAM     Physical Exam  Vitals and nursing note reviewed.   Constitutional:       Appearance: She is well-developed. She is obese.   Cardiovascular:      Rate and Rhythm: Tachycardia present. Rhythm irregular.   Pulmonary:      Comments: Patient appears short of  breath.  Musculoskeletal:         General: Normal range of motion.      Right lower leg: No edema.      Left lower leg: No edema.   Skin:     General: Skin is warm.      Capillary Refill: Capillary refill takes less than 2 seconds.   Neurological:      General: No focal deficit present.      Mental Status: She is alert and oriented to person, place, and time.         Physical Exam   Constitutional: Nontoxic cooperative and pleasant with exam.  No accessory muscle use.  Tachypneic tachycardic.  Head: Atraumatic.     Nose: Nose normal.     Mouth/Throat: Oropharynx is clear and moist.     Eyes: EOM are normal. Pupils are equal, round, and reactive to light.     Ears: Bilateral pearly white TMs.    Neck: Normal range of motion. Neck supple.     Cardiovascular: Tachycardic, irregular rhythm.    Pulmonary/Chest: Tachypnea occasional crackles on auscultation.    Abdominal: Soft.  Nontender    Musculoskeletal: Normal range of motion.     Neurological: No deficit.    Lymphatics: No edema.  No calf tenderness palpation    Skin: Skin is warm and dry.     Psychiatric: Normal mood and affect. Behavior is normal.       LAB:     All pertinent labs reviewed and interpreted.  Labs Ordered and Resulted from Time of ED Arrival to Time of ED Departure   INFLUENZA A/B & SARS-COV2 PCR MULTIPLEX - Abnormal       Result Value    Influenza A PCR Negative      Influenza B PCR Negative      SARS CoV2 PCR Positive (*)    BASIC METABOLIC PANEL - Abnormal    Sodium 140      Potassium 4.1      Chloride 107      Carbon Dioxide (CO2) 22      Anion Gap 11      Urea Nitrogen 23      Creatinine 1.01      Calcium 9.7      Glucose 113      GFR Estimate 54 (*)    B-TYPE NATRIURETIC PEPTIDE ( EAST ONLY) - Abnormal     (*)    CBC WITH PLATELETS - Normal    WBC Count 9.8      RBC Count 3.91      Hemoglobin 12.9      Hematocrit 38.2      MCV 98      MCH 33.0      MCHC 33.8      RDW 14.0      Platelet Count 226     TROPONIN I - Normal    Troponin I  <0.01     D DIMER QUANTITATIVE - Normal    D-Dimer Quantitative 0.32          RADIOLOGY:     Reviewed all pertinent imaging. Please see official radiology report.  Chest XR,  PA & LAT   Final Result   IMPRESSION: Small hiatal hernia. No acute cardiopulmonary process. Stable cardiomediastinal silhouette.           EKG:     EKG #1  Atrial flutter with variable block.  Normal anterior progression.  Left axis deviation.  LVH.    Time:674108    Ventricular rate 104 bmp  Axis LAD  CO interval ms  QRS duration 102 ms  QT//441 ms    Compared to previous EKG on May 31, 2018 sinus rhythm.  I have independently reviewed and interpreted the EKG(s) documented above.    EKG #2  Has rhythm PACs normal anterior progression left axis deviation left anterior fascicular block    Time:161375    Ventricular rate 85 bmp  Axis normal   CO interval 170 ms  QRS duration 100 ms  QT//428 ms    Compared to previous EKG on 2 EKG patient no longer in flutter.  Compared to EKGs on 2018 patient was in sinus rhythm then.  PROCEDURES:     Procedures      I, Yesica Cali, am serving as a scribe to document services personally performed by Dr. Frankel based on my observation and the provider's statements to me. I, Vanesa Frankel MD attest that Yesica Cali is acting in a scribe capacity, has observed my performance of the services and has documented them in accordance with my direction.    Vanesa Frankel M.D.  Emergency Medicine  [facility] EMERGENCY DEPARTMENT  [facility] [facility] [id number] [phone]  Dept: [phone]     Vanesa Frankel MD  04/23/22 5061

## 2022-04-23 NOTE — PHARMACY-ADMISSION MEDICATION HISTORY
Pharmacy Note - Admission Medication History    Pertinent Provider Information: none at this time     ______________________________________________________________________    Prior To Admission (PTA) med list completed and updated in EMR.       PTA Med List   Medication Sig Note Last Dose     acetaminophen (TYLENOL) 325 MG tablet Take 325 mg by mouth every 6 hours as needed for mild pain  Past Week at Unknown time     amoxicillin (AMOXIL) 500 MG capsule Take 500 mg by mouth 3 times daily (Take 4 capsules immediately on 4/20/22 then 1 capsule 3 times per day for 7 days) 4/23/2022: 7 day supply from 4/20/22 until 4/27/22. Patient states she usually one takes 1 per day 4/22/2022 at Unknown time     calcium-vitamin D-vitamin K (VIACTIV) 500-500-40 MG-UNT-MCG CHEW Take 1 tablet by mouth daily  4/22/2022 at Unknown time     levothyroxine (SYNTHROID/LEVOTHROID) 50 MCG tablet Take 50 mcg by mouth daily  4/22/2022 at am     metoprolol succinate ER (TOPROL-XL) 25 MG 24 hr tablet Take 25 mg by mouth daily  4/22/2022 at am     nitroGLYcerin (NITROSTAT) 0.4 MG sublingual tablet Place 1 tablet (0.4 mg) under the tongue every 5 minutes as needed for chest pain If you are still having symptoms after 3 doses (15 minutes) call 911.  More than a month at Unknown time     rivaroxaban ANTICOAGULANT (XARELTO) 20 MG TABS tablet Take 20 mg by mouth every morning  4/22/2022 at am     vitamin B-2 (RIBOFLAVIN) 25 MG TABS tablet Take 1 tablet by mouth daily  4/22/2022 at Unknown time       Information source(s): Patient, Patient's pharmacy and [facility]  Method of interview communication: phone    Summary of Changes to PTA Med List  New: vitamin B2, amoxicillin, acetaminophen  Discontinued: augmentin, flonase, gabapentin  Changed: levothyroxine (from 25mcg to 50mcg), metoprolol (from tartrate to succinate)     Patient was asked about OTC/herbal products specifically.  PTA med list reflects this.    In the past week, patient  estimated taking medication this percent of the time:  greater than 90%.    Allergies were reviewed, assessed, and updated with the patient.      Patient does not use any multi-dose medications prior to admission.    The information provided in this note is only as accurate as the sources available at the time of the update(s).    Thank you for the opportunity to participate in the care of this patient.    Yeimi Walls  4/23/2022 2:06 PM

## 2022-04-25 ENCOUNTER — VIRTUAL VISIT (OUTPATIENT)
Dept: URGENT CARE | Facility: CLINIC | Age: 87
End: 2022-04-25
Payer: COMMERCIAL

## 2022-04-25 ENCOUNTER — E-VISIT (OUTPATIENT)
Dept: URGENT CARE | Facility: CLINIC | Age: 87
End: 2022-04-25

## 2022-04-25 DIAGNOSIS — I48.20 CHRONIC ATRIAL FIBRILLATION (H): ICD-10-CM

## 2022-04-25 DIAGNOSIS — U07.1 INFECTION DUE TO 2019 NOVEL CORONAVIRUS: Primary | ICD-10-CM

## 2022-04-25 DIAGNOSIS — I77.810 ASCENDING AORTA DILATION (H): ICD-10-CM

## 2022-04-25 DIAGNOSIS — U07.1 SARS-COV-2 POSITIVE: Primary | ICD-10-CM

## 2022-04-25 DIAGNOSIS — R06.02 SHORTNESS OF BREATH: ICD-10-CM

## 2022-04-25 DIAGNOSIS — J44.9 CHRONIC OBSTRUCTIVE PULMONARY DISEASE, UNSPECIFIED COPD TYPE (H): ICD-10-CM

## 2022-04-25 DIAGNOSIS — R05.9 COUGH: ICD-10-CM

## 2022-04-25 DIAGNOSIS — Z79.01 LONG TERM CURRENT USE OF ANTICOAGULANT THERAPY: ICD-10-CM

## 2022-04-25 PROCEDURE — 99207 PR NO CHARGE LOS: CPT | Performed by: PHYSICIAN ASSISTANT

## 2022-04-25 PROCEDURE — 99213 OFFICE O/P EST LOW 20 MIN: CPT | Mod: TEL

## 2022-04-25 RX ORDER — LEVOTHYROXINE SODIUM 50 UG/1
50 TABLET ORAL
COMMUNITY
Start: 2022-03-28 | End: 2022-07-06

## 2022-04-25 NOTE — TELEPHONE ENCOUNTER
Message from provider was put in about 30 minutes ago, I don't see any hosp follow up slots available in our clinic.    Should she keep the virtual urgent care visit tonight?    Angelique Moreno RN  [facility]

## 2022-04-25 NOTE — TELEPHONE ENCOUNTER
Called daughter and relayed the messaged below. Patient scheduled for virtual urgent care this evening and a follow up next Monday with Yessy Juarez.     Negar Bates RN

## 2022-04-25 NOTE — PROGRESS NOTES
Assessment:    SARS-CoV-2 positive    Cough    Shortness of breath    Ascending aorta dilation (H)    Chronic atrial fibrillation (H)    Chronic obstructive pulmonary disease, unspecified COPD type (H)    Long term current use of anticoagulant therapy    COVID-19 positive patient.  Encounter for consideration of medication intervention.    Patient does qualify for a prescription.   Full discussion with patient including medication options, risks and benefits.   Potential drug interactions reviewed with patient.    Discussed with daughterJanis    Plan:  Treatment planned:  Due to medications and heart history, MNRAP Monoclonal antibody questionnaire completed by provider Sudhakar Cordova PA-C    Temporary change to home medications:   None    See patient instructions    Patient preference to obtain AVS:  Savannah Houston  is a 87 year old  who has a confirmed new positive COVID-19 diagnosis.      She has been identified as high risk for complications of this infection, and is being evaluated via a billable     Phone visit.      Phone call duration: 15 minutes    Concern for COVID-19  Exactly how many days ago did these symptoms start? 4 days    Are you vaccinated? Yes     Are any of the following symptoms significant for you?  New or worsening difficulty breathing? Yes    Please describe what kind of difficulty you are having breathing:Moderate dyspnea (short of breath with ADLs, shortness of breath that limits the ability to walk up one flight of stairs without needing to rest)    Worsening cough? Yes, I am coughing up mucus.    Fever or chills? Yes, I felt feverish or had chills.    Headache: YES    Sore throat: no    Chest pain: YES    Diarrhea: no    Body aches? no    What treatments has patient tried? Acetaminophen   Does patient live in a nursing home, group home, or shelter? no  Does patient have a way to get food/medications during quarantined? Yes, I have a friend or family member who can  "help me.         No flowsheet data found.          Constitutional, HEENT, cardiovascular, pulmonary, GI, , musculoskeletal, neuro, skin, endocrine and psych systems are negative, except as otherwise noted.    Objective    Vitals:  No vitals were obtained today due to virtual visit.  Estimated body mass index is 30.18 kg/m  as calculated from the following:    Height as of 3/19/22: 1.575 m (5' 2\").    Weight as of 4/23/22: 74.8 kg (165 lb).   General: Alert, no apparent distress  PSYCH: Alert; coherent speech, normal rate and volume, able to articulate logical thoughts, appropriate insight, no tangential thoughts, no hallucination or delusions.  Affect is appropriate  RESP: No cough, no audible wheezing, able to talk in full sentences  Remainder of exam unable to be completed due to telephone visit  GFR Estimate   Date Value Ref Range Status   04/23/2022 54 (L) >60 mL/min/1.73m2 Final     Comment:     Effective December 21, 2021 eGFRcr in adults is calculated using the 2021 CKD-EPI creatinine equation which includes age and gender (Huber et al., NEJ, DOI: 10.1056/PVKVwo1250083)   06/29/2020 60 (L) >60 mL/min/[1.73_m2] Final     Comment:     Non  GFR Calc  Starting 12/18/2018, serum creatinine based estimated GFR (eGFR) will be   calculated using the Chronic Kidney Disease Epidemiology Collaboration   (CKD-EPI) equation.       GFR, ESTIMATED POCT   Date Value Ref Range Status   01/17/2022 54 (L) >60 mL/min/1.73m2 Final                  "

## 2022-04-25 NOTE — TELEPHONE ENCOUNTER
Patient has a VV with  provider tonight. Also please see ED notes.    Rosemarie RN,BSN  Triage Nurse  [facility]: [facility]  Ph: [phone]

## 2022-04-25 NOTE — TELEPHONE ENCOUNTER
Patient should keep urgent care appointment for today and please schedule for follow-up on Monday-virtually    Yessy STRAUSS

## 2022-04-26 ENCOUNTER — TELEPHONE (OUTPATIENT)
Dept: FAMILY MEDICINE | Facility: CLINIC | Age: 87
End: 2022-04-26
Payer: COMMERCIAL

## 2022-04-26 NOTE — TELEPHONE ENCOUNTER
Routing call to PCP- **Route back to [facility] staff**    Daughter Janis calling trying to get her mom monoclonal antibody treatment. She had an urgent care visit 4/25/22 and this was suppose to be initiated. The daughter has called several placed and she keeps getting transferred. She has called [place] department of [facility] and they told her that the PCP now orders this.    Is this something that can be ordered for pt?    Sandrine Quan RN  [facility]: [place]  Phone: [phone]  Fax:[phone]

## 2022-04-26 NOTE — TELEPHONE ENCOUNTER
Visit with urgent care provider was at 8 PM last night.  It may take a bit more time to receive a call.  If they do not hear anything by early afternoon can follow the directions below.    If you have questions about your virtual  visit contact [facility] VIRTUAL  URGENT CARE directly at [phone].    Yessy MACARIOC

## 2022-04-26 NOTE — PATIENT INSTRUCTIONS
Rosemarie, I am sorry you have not heard from scheduling for treatment yet. They generally will reach out to you to schedule within 1-2 days from when they receive the order. If you continue to not hear from them you can contact the [place] department of [facility] [phone].    1930: Bedside shift change report given to Malissa Park RN (oncoming nurse) by Juan Manuel Rico RN (offgoing nurse). Report included the following information SBAR, ED Summary, Intake/Output, MAR, Recent Results, Med Rec Status and Cardiac Rhythm NSR/franco.

## 2022-04-26 NOTE — TELEPHONE ENCOUNTER
Routed to covering providers.  Please see last [employer] message below.      Rosemarie RN,BSN  Triage Nurse  [facility]: [facility]  Ph: [phone]

## 2022-04-26 NOTE — TELEPHONE ENCOUNTER
Per urgent care appointment review, this [place] provider did fill out the MNRAP monoclonal antibody form.     Due to medications and heart history, MNRAP Monoclonal antibody questionnaire completed by provider Sudhakar Cordova PA-C.     Medical secretaries, can you contact the [phone] to determine if the form was submitted by the provider?     I am also forwarding this to the provider who saw the patient virtually in case form needs to be re-submitted.     Martha Parrish PA-C on 4/26/2022 at 5:12 PM

## 2022-04-27 LAB
ATRIAL RATE - MUSE: 85 BPM
DIASTOLIC BLOOD PRESSURE - MUSE: 55 MMHG
INTERPRETATION ECG - MUSE: NORMAL
P AXIS - MUSE: 64 DEGREES
PR INTERVAL - MUSE: 170 MS
QRS DURATION - MUSE: 100 MS
QT - MUSE: 360 MS
QTC - MUSE: 428 MS
R AXIS - MUSE: -51 DEGREES
SYSTOLIC BLOOD PRESSURE - MUSE: 102 MMHG
T AXIS - MUSE: 61 DEGREES
VENTRICULAR RATE- MUSE: 85 BPM

## 2022-04-27 NOTE — TELEPHONE ENCOUNTER
I attempted to complete the MNRAP form. This is the message that I received.     Yessy STRAUSS      You are receiving this message because the MNRAP system has detected a duplicate submission. Only one entry is possible per patient. If you feel this message is in error please contact your healthcare provider.      Survey Completion   0% Current Progress 100%    100%              [place] Department of Health  [email or web address]  [phone]  [address] [place] [address] [id number] [place] [id number]

## 2022-04-27 NOTE — TELEPHONE ENCOUNTER
Left message on voice mail for daughtert to call clinic and sent message via My chart.   [phone]  Jory Yadav RN  [facility]

## 2022-04-27 NOTE — TELEPHONE ENCOUNTER
Daughter called and they have not heard back yet. When completed please update patient and daughter through [employer].     Thank you,  Negar Bates RN

## 2022-04-27 NOTE — TELEPHONE ENCOUNTER
Please check with Daughter to see if received a call for monoclonal antibodies.  If did not I will refill out MNRAP form.     Yessy STRAUSS

## 2022-04-28 NOTE — TELEPHONE ENCOUNTER
Call placed to the [place] Department of Health.  Have been in touch with Sherylene [facility] Covid-19 Provider Hotline [phone].  Confirms that Rosemarie has been approved for monoclonal antibody infusion at [facility].    Yessy MACARIOC

## 2022-04-28 NOTE — TELEPHONE ENCOUNTER
Have called multiple numbers on patient's behalf at [facility] to attempt to assist patient in getting scheduled for monoclonal antibody infusion.  Have been transferred many times, most recently was told would receive callback from infusion .  Did speak with [facility] contact Sherylen, she confirms that there has been a glitch in the scheduling system at [facility].  Now appears that patient may be scheduled at the [facility] for infusion. Sherylen plans to clarify further and call this provider on personal cell phone with plan for patient.    Yessy MACARIOC

## 2022-04-29 NOTE — TELEPHONE ENCOUNTER
At last communication with [facility] yesterday at 336 PM they indicated via e-mail communications that Rosemarie had been scheduled for infusion. Message sent to Rosemarie to verify.     Yessy STRAUSS

## 2022-05-02 ENCOUNTER — VIRTUAL VISIT (OUTPATIENT)
Dept: FAMILY MEDICINE | Facility: CLINIC | Age: 87
End: 2022-05-02
Payer: COMMERCIAL

## 2022-05-02 DIAGNOSIS — U07.1 INFECTION DUE TO 2019 NOVEL CORONAVIRUS: Primary | ICD-10-CM

## 2022-05-02 PROCEDURE — 99212 OFFICE O/P EST SF 10 MIN: CPT | Mod: 95 | Performed by: NURSE PRACTITIONER

## 2022-05-02 ASSESSMENT — ENCOUNTER SYMPTOMS
DIZZINESS: 0
NAUSEA: 0
SHORTNESS OF BREATH: 0
FEVER: 0
DIAPHORESIS: 0
DIARRHEA: 0
EYE ITCHING: 0
EYE DISCHARGE: 0
SORE THROAT: 0
COUGH: 0
CHILLS: 0
HEADACHES: 0
WHEEZING: 0
FATIGUE: 0
RHINORRHEA: 0
CONSTIPATION: 0
LIGHT-HEADEDNESS: 0
SINUS PRESSURE: 0

## 2022-05-02 NOTE — PROGRESS NOTES
Rosemarie is a 87 year old who is being evaluated via a billable telephone visit.      What phone number would you like to be contacted at? [phone]  How would you like to obtain your AVS? MyChart    Assessment & Plan     Infection due to 2019 novel coronavirus  Notes reviewed.  Recovering well.  Education given regarding warning signs to watch for and when would need to seek immediate medical attention.      13 minutes spent on the date of the encounter doing chart review, history and exam, documentation and further activities per the note       No follow-ups on file.    TONY Ceja CNP  [facility] LIZ Houston is a 87 year old who presents for the following health issues     History of Present Illness       Reason for visit:  Follow up with Covid and how my mom is doing. Please call her at home [phone]  Symptom onset:  3-7 days ago  Symptoms include:  Body aches, cough, fever,  feel like crap  Symptom intensity:  Moderate  Symptom progression:  Improving  Had these symptoms before:  No  What makes it worse:  Ask my mom  What makes it better:  Ask ny mom    She eats 2-3 servings of fruits and vegetables daily.She consumes 0 sweetened beverage(s) daily.She exercises with enough effort to increase her heart rate 20 to 29 minutes per day.  She exercises with enough effort to increase her heart rate 3 or less days per week. She is missing 1 dose(s) of medications per week.  She is not taking prescribed medications regularly due to cost of medication.       Phone call visit completed due to COVID 19 outbreak.     Was dx with COVID on 4/21/22. Was referred for monoclonal antibodies. Plans to try to get the infusion today. Has been trying to push fluids. No shortness or breath. I taking cough medication and that is helping some to reduce the cough. Does get some chills. No fevers or sweats.  Overall, feels like he is recovering well from COVID-19.    Has a tooth ache. Is  having a lot of pain to tooth. Is going to be having tooth pulled later today.  Did have appointment scheduled last week however, due to having COVID-19 was rescheduled.    Review of Systems   Constitutional: Negative for chills, diaphoresis, fatigue and fever.   HENT: Positive for dental problem. Negative for ear discharge, ear pain, hearing loss, rhinorrhea, sinus pressure and sore throat.    Eyes: Negative for discharge and itching.   Respiratory: Negative for cough, shortness of breath and wheezing.    Gastrointestinal: Negative for constipation, diarrhea and nausea.   Skin: Negative for rash.   Neurological: Negative for dizziness, light-headedness and headaches.          Objective           Vitals:  No vitals were obtained today due to virtual visit.    Physical Exam   healthy, alert and no distress  PSYCH: Alert and oriented times 3; coherent speech, normal   rate and volume, able to articulate logical thoughts, able   to abstract reason, no tangential thoughts, no hallucinations   or delusions  Her affect is normal and pleasant  RESP: No cough, no audible wheezing, able to talk in full sentences  Remainder of exam unable to be completed due to telephone visits          Phone call duration: 10 minutes

## 2022-05-07 ENCOUNTER — HEALTH MAINTENANCE LETTER (OUTPATIENT)
Age: 87
End: 2022-05-07

## 2022-06-07 ENCOUNTER — MYC MEDICAL ADVICE (OUTPATIENT)
Dept: FAMILY MEDICINE | Facility: CLINIC | Age: 87
End: 2022-06-07
Payer: COMMERCIAL

## 2022-06-08 NOTE — TELEPHONE ENCOUNTER
Does not look like I have seen her for the dizziness-she needs an appointment.    Yessy Juarez NP-C

## 2022-06-21 ENCOUNTER — TELEPHONE (OUTPATIENT)
Dept: CARDIOLOGY | Facility: CLINIC | Age: 87
End: 2022-06-21
Payer: COMMERCIAL

## 2022-06-21 DIAGNOSIS — I48.20 CHRONIC ATRIAL FIBRILLATION (H): ICD-10-CM

## 2022-06-21 DIAGNOSIS — I71.40 ABDOMINAL AORTIC ANEURYSM (AAA) WITHOUT RUPTURE (H): Primary | ICD-10-CM

## 2022-06-21 NOTE — TELEPHONE ENCOUNTER
PC with patient and explained rationale and where this order for CTA of abdomen came from. Review of previous test preformed in January, and repeat in 6 months. Pt verbalized understanding and agreeable to arrange. Transferred directly to [facility] scheduling. Discussion with patient if would like her daughter Janis informed, patient verbalized that she did. PC to daughter and left detailed message on identified private voicemail. Order in place is current. Ankita,Rn

## 2022-06-21 NOTE — TELEPHONE ENCOUNTER
----- Message from Angela Arteaga sent at 6/15/2022 10:03 AM CDT -----  Regarding: HERMANN  General phone call:    Caller: BERNARDO ZAPATA [facility]   Primary cardiologist: HERMANN  Detailed reason for call: PT STATES SHE WASN'T AWARE OF THE CT SCREEN WHEN CAROLE CALLED TO SCHEDULE HER. PT STATED SHE DOESN'T NEED CT SCAN. CAROLE JUST GIVING A FYI   Best phone number: [phone]  Best time to contact:ANYTIME   Ok to leave a detailedmessage? OK  Device? DIDN'T ASK    Additional Info:          THANKS ANGELA

## 2022-06-21 NOTE — TELEPHONE ENCOUNTER
Incoming call from daughter that patient wants [facility] as primary cards, and would like follow-up appt. Transferred to schedulers to arrange follow-up with [facility], and then next steps for CTA abdomen scheduling. LEANDRO,RN

## 2022-06-22 ENCOUNTER — TRANSFERRED RECORDS (OUTPATIENT)
Dept: FAMILY MEDICINE | Facility: CLINIC | Age: 87
End: 2022-06-22

## 2022-07-06 ENCOUNTER — OFFICE VISIT (OUTPATIENT)
Dept: FAMILY MEDICINE | Facility: CLINIC | Age: 87
End: 2022-07-06
Payer: COMMERCIAL

## 2022-07-06 VITALS
OXYGEN SATURATION: 96 % | HEART RATE: 113 BPM | DIASTOLIC BLOOD PRESSURE: 76 MMHG | SYSTOLIC BLOOD PRESSURE: 131 MMHG | TEMPERATURE: 99.1 F | BODY MASS INDEX: 31.62 KG/M2 | WEIGHT: 171.8 LBS | HEIGHT: 62 IN | RESPIRATION RATE: 24 BRPM

## 2022-07-06 DIAGNOSIS — I48.20 CHRONIC ATRIAL FIBRILLATION (H): ICD-10-CM

## 2022-07-06 DIAGNOSIS — M48.061 LUMBAR FORAMINAL STENOSIS: ICD-10-CM

## 2022-07-06 DIAGNOSIS — G89.29 CHRONIC BILATERAL LOW BACK PAIN WITHOUT SCIATICA: ICD-10-CM

## 2022-07-06 DIAGNOSIS — M54.50 CHRONIC BILATERAL LOW BACK PAIN WITHOUT SCIATICA: ICD-10-CM

## 2022-07-06 DIAGNOSIS — R42 DIZZINESS: Primary | ICD-10-CM

## 2022-07-06 DIAGNOSIS — Z23 ENCOUNTER FOR IMMUNIZATION: ICD-10-CM

## 2022-07-06 DIAGNOSIS — M51.369 DDD (DEGENERATIVE DISC DISEASE), LUMBAR: ICD-10-CM

## 2022-07-06 LAB
BASOPHILS # BLD AUTO: 0 10E3/UL (ref 0–0.2)
BASOPHILS NFR BLD AUTO: 1 %
EOSINOPHIL # BLD AUTO: 0.2 10E3/UL (ref 0–0.7)
EOSINOPHIL NFR BLD AUTO: 3 %
ERYTHROCYTE [DISTWIDTH] IN BLOOD BY AUTOMATED COUNT: 13.4 % (ref 10–15)
HCT VFR BLD AUTO: 37.6 % (ref 35–47)
HGB BLD-MCNC: 12.3 G/DL (ref 11.7–15.7)
LYMPHOCYTES # BLD AUTO: 2.4 10E3/UL (ref 0.8–5.3)
LYMPHOCYTES NFR BLD AUTO: 38 %
MCH RBC QN AUTO: 33.1 PG (ref 26.5–33)
MCHC RBC AUTO-ENTMCNC: 32.7 G/DL (ref 31.5–36.5)
MCV RBC AUTO: 101 FL (ref 78–100)
MONOCYTES # BLD AUTO: 0.9 10E3/UL (ref 0–1.3)
MONOCYTES NFR BLD AUTO: 14 %
NEUTROPHILS # BLD AUTO: 2.8 10E3/UL (ref 1.6–8.3)
NEUTROPHILS NFR BLD AUTO: 45 %
PLATELET # BLD AUTO: 249 10E3/UL (ref 150–450)
RBC # BLD AUTO: 3.72 10E6/UL (ref 3.8–5.2)
WBC # BLD AUTO: 6.3 10E3/UL (ref 4–11)

## 2022-07-06 PROCEDURE — 99215 OFFICE O/P EST HI 40 MIN: CPT | Mod: 25 | Performed by: NURSE PRACTITIONER

## 2022-07-06 PROCEDURE — 90677 PCV20 VACCINE IM: CPT | Performed by: NURSE PRACTITIONER

## 2022-07-06 PROCEDURE — 90715 TDAP VACCINE 7 YRS/> IM: CPT | Performed by: NURSE PRACTITIONER

## 2022-07-06 PROCEDURE — 85025 COMPLETE CBC W/AUTO DIFF WBC: CPT | Performed by: NURSE PRACTITIONER

## 2022-07-06 PROCEDURE — 36415 COLL VENOUS BLD VENIPUNCTURE: CPT | Performed by: NURSE PRACTITIONER

## 2022-07-06 PROCEDURE — G0009 ADMIN PNEUMOCOCCAL VACCINE: HCPCS | Performed by: NURSE PRACTITIONER

## 2022-07-06 PROCEDURE — 93000 ELECTROCARDIOGRAM COMPLETE: CPT | Performed by: NURSE PRACTITIONER

## 2022-07-06 PROCEDURE — 90472 IMMUNIZATION ADMIN EACH ADD: CPT | Performed by: NURSE PRACTITIONER

## 2022-07-06 PROCEDURE — 80053 COMPREHEN METABOLIC PANEL: CPT | Performed by: NURSE PRACTITIONER

## 2022-07-06 RX ORDER — METOPROLOL SUCCINATE 25 MG/1
50 TABLET, EXTENDED RELEASE ORAL DAILY
Start: 2022-07-06 | End: 2023-03-13

## 2022-07-06 ASSESSMENT — ENCOUNTER SYMPTOMS
SHORTNESS OF BREATH: 0
HEADACHES: 0
VOMITING: 0
FATIGUE: 0
DIZZINESS: 1
RHINORRHEA: 1
SORE THROAT: 0
CHEST TIGHTNESS: 0
MYALGIAS: 0
COUGH: 0
ABDOMINAL DISTENTION: 0
DYSPHORIC MOOD: 0
BACK PAIN: 1
LIGHT-HEADEDNESS: 0
NERVOUS/ANXIOUS: 0
ARTHRALGIAS: 0
CONSTIPATION: 0
NUMBNESS: 0
WHEEZING: 0
SLEEP DISTURBANCE: 0
DIARRHEA: 0
PALPITATIONS: 0
NAUSEA: 0
ABDOMINAL PAIN: 0

## 2022-07-06 ASSESSMENT — PAIN SCALES - GENERAL: PAINLEVEL: MODERATE PAIN (5)

## 2022-07-06 NOTE — PATIENT INSTRUCTIONS
Your heart rate is elevated today in clinic.  It is ranging from 105-115.  If your heart is beating really fast this could cause you some dizziness.  Should increase your metoprolol to 50 mg daily.  You may take 2 of the 25 mg tablets that you have available at home.    We will check some lab work here today to make sure all of that looks okay.  Your EKG was unchanged from your previous 1.    Would also recommend that we obtain a CT scan of your head to further evaluate your dizziness. You can call imaging scheduling to set up appointment date, time, and location that works best for you to have CT of head [phone]    Pain management should also be reaching out to you to assist you in scheduling date, time and location for consideration of getting an injection into your back.    We did give you a tetanus vaccine and pneumonia vaccine so your arms may be a little bit sore tomorrow and you may feel a bit more tired and rundown.

## 2022-07-06 NOTE — NURSING NOTE
Prior to immunization administration, verified patients identity using patient s name and date of birth. Please see Immunization Activity for additional information.     Screening Questionnaire for Adult Immunization    Are you sick today?   No   Do you have allergies to medications, food, a vaccine component or latex?   No   Have you ever had a serious reaction after receiving a vaccination?   No   Do you have a long-term health problem with heart, lung, kidney, or metabolic disease (e.g., diabetes), asthma, a blood disorder, no spleen, complement component deficiency, a cochlear implant, or a spinal fluid leak?  Are you on long-term aspirin therapy?   No   Do you have cancer, leukemia, HIV/AIDS, or any other immune system problem?   No   Do you have a parent, brother, or sister with an immune system problem?   No   In the past 3 months, have you taken medications that affect  your immune system, such as prednisone, other steroids, or anticancer drugs; drugs for the treatment of rheumatoid arthritis, Crohn s disease, or psoriasis; or have you had radiation treatments?   No   Have you had a seizure, or a brain or other nervous system problem?   No   During the past year, have you received a transfusion of blood or blood    products, or been given immune (gamma) globulin or antiviral drug?   No   For women: Are you pregnant or is there a chance you could become       pregnant during the next month?   No   Have you received any vaccinations in the past 4 weeks?   No     Immunization questionnaire answers were all negative.        Per orders of Yessy Juarez, injection of Tdap, Pneumo 20  given by Ana Luisa Garcia. Patient instructed to remain in clinic for 15 minutes afterwards, and to report any adverse reaction to me immediately.       Screening performed by Ana Luisa Garcia on 7/6/2022 at 4:58 PM.

## 2022-07-06 NOTE — PROGRESS NOTES
Assessment & Plan     Dizziness  We will check labs here today.  Will obtain CT of head.  Consider physical therapy to help with balance in the future.  - CBC with Platelets & Differential; Future  - Comprehensive metabolic panel; Future  - EKG 12-lead complete w/read - Clinics  - CT Head w/o Contrast; Future  - CBC with Platelets & Differential  - Comprehensive metabolic panel    Lumbar foraminal stenosis  Previous MRI reviewed.  Will refer to pain management for possible injection.  - Pain Management Referral; Future    DDD (degenerative disc disease), lumbar  Previous MRI reviewed.  Will refer to pain management for possible injection.  - Pain Management Referral; Future    Chronic bilateral low back pain without sciatica  Previous MRI reviewed.  Will refer to pain management for possible injection.  - Pain Management Referral; Future    Chronic atrial fibrillation (H)  Heart rate up to 115 today in clinic.  EKG continues to show chronic atrial fibrillation.  Will increase metoprolol from 25 mg orally daily to 50 mg orally daily.  Follow-up with cardiology in August as previously planned.  - EKG 12-lead complete w/read - Clinics  - metoprolol succinate ER (TOPROL XL) 25 MG 24 hr tablet; Take 2 tablets (50 mg) by mouth daily    Encounter for immunization  Administered today in clinic.  - PNEUMOCOCCAL 20 VALENT CONJUGATE (PREVNAR 20)    Per Rosemarie's request-daughter Janis was called and notified of recommendations made during visit today.    Review of the result(s) of each unique test - Labs, imaging, EKG  Ordering of each unique test  Prescription drug management  48 minutes spent on the date of the encounter doing chart review, history and exam, documentation and further activities per the note     No follow-ups on file.    TONY Ceja [facility] LIZ Houston is a 87 year old, presenting for the following health issues:  Dizziness (Sinus issues x 2-3  "mos)      HPI     Dizziness and Sinus issues  - has been going on 2-3 months  - \"has had this since she saw Yessy for a sinus infection several months ago\"      When eats or drinks will have congestion. Will feel it in head. Will take 2 tylenol and 1 gabapentin and that makes some better, but it does not go completely away.  Since have last sinus infection in February will feel like is losing balance. Is not able to go for a walk because feels off balance. No falls. Does feel weak at times.  Is able to walk, talk, eat, drink move arms and legs like wants to.  No head trauma/injury.  No headaches. Felt very tired today. No shortness of breath.  No fluttering in chest.    Has lower back pain to both sides for some time.  No radiating pain or pain that goes down legs or into buttock.  Did have MRI in the past, September 2021.  Was recommended to see spinal surgeon at that time.  Does not want surgery.    Review of Systems   Constitutional: Negative for fatigue.   HENT: Positive for congestion and rhinorrhea. Negative for ear pain and sore throat.    Eyes: Negative for visual disturbance.   Respiratory: Negative for cough, chest tightness, shortness of breath and wheezing.    Cardiovascular: Negative for chest pain and palpitations.   Gastrointestinal: Negative for abdominal distention, abdominal pain, constipation, diarrhea, nausea and vomiting.   Endocrine: Negative for cold intolerance and heat intolerance.   Musculoskeletal: Positive for back pain (lower). Negative for arthralgias and myalgias.   Skin: Negative for rash.   Neurological: Positive for dizziness. Negative for light-headedness, numbness and headaches.   Psychiatric/Behavioral: Negative for dysphoric mood and sleep disturbance. The patient is not nervous/anxious.           Objective    /76   Pulse 113   Temp 99.1  F (37.3  C) (Tympanic)   Resp 24   Ht 1.575 m (5' 2\")   Wt 77.9 kg (171 lb 12.8 oz)   SpO2 96%   Breastfeeding No   BMI 31.42 " kg/m    Body mass index is 31.42 kg/m .  Physical Exam  Constitutional:       Appearance: Normal appearance. She is well-developed.   HENT:      Head: Normocephalic and atraumatic.      Right Ear: Tympanic membrane and external ear normal. No middle ear effusion.      Left Ear: Tympanic membrane and external ear normal.  No middle ear effusion.      Nose: No mucosal edema.   Neck:      Thyroid: No thyromegaly.      Vascular: No carotid bruit.   Cardiovascular:      Rate and Rhythm: Tachycardia present. Rhythm irregular.      Heart sounds: Normal heart sounds.   Pulmonary:      Effort: Pulmonary effort is normal.      Breath sounds: Normal breath sounds.   Abdominal:      General: Bowel sounds are normal.      Palpations: Abdomen is soft.   Skin:     General: Skin is warm and dry.   Neurological:      Mental Status: She is alert.   Psychiatric:         Behavior: Behavior normal.                 .  ..

## 2022-07-07 LAB
ALBUMIN SERPL-MCNC: 3.9 G/DL (ref 3.4–5)
ALP SERPL-CCNC: 107 U/L (ref 40–150)
ALT SERPL W P-5'-P-CCNC: 30 U/L (ref 0–50)
ANION GAP SERPL CALCULATED.3IONS-SCNC: 4 MMOL/L (ref 3–14)
AST SERPL W P-5'-P-CCNC: 21 U/L (ref 0–45)
BILIRUB SERPL-MCNC: 0.3 MG/DL (ref 0.2–1.3)
BUN SERPL-MCNC: 39 MG/DL (ref 7–30)
CALCIUM SERPL-MCNC: 9.6 MG/DL (ref 8.5–10.1)
CHLORIDE BLD-SCNC: 108 MMOL/L (ref 94–109)
CO2 SERPL-SCNC: 27 MMOL/L (ref 20–32)
CREAT SERPL-MCNC: 1.14 MG/DL (ref 0.52–1.04)
GFR SERPL CREATININE-BSD FRML MDRD: 46 ML/MIN/1.73M2
GLUCOSE BLD-MCNC: 97 MG/DL (ref 70–99)
POTASSIUM BLD-SCNC: 5.2 MMOL/L (ref 3.4–5.3)
PROT SERPL-MCNC: 7.8 G/DL (ref 6.8–8.8)
SODIUM SERPL-SCNC: 139 MMOL/L (ref 133–144)

## 2022-07-25 ENCOUNTER — HOSPITAL ENCOUNTER (OUTPATIENT)
Dept: CT IMAGING | Facility: HOSPITAL | Age: 87
Discharge: HOME | End: 2022-07-25
Attending: NURSE PRACTITIONER
Payer: COMMERCIAL

## 2022-07-25 ENCOUNTER — HOSPITAL ENCOUNTER (OUTPATIENT)
Dept: CT IMAGING | Facility: HOSPITAL | Age: 87
Discharge: HOME | End: 2022-07-25
Attending: INTERNAL MEDICINE
Payer: COMMERCIAL

## 2022-07-25 DIAGNOSIS — I67.1 SACCULAR ANEURYSM: ICD-10-CM

## 2022-07-25 DIAGNOSIS — R42 DIZZINESS: ICD-10-CM

## 2022-07-25 DIAGNOSIS — I71.40 ABDOMINAL AORTIC ANEURYSM (AAA) WITHOUT RUPTURE (H): ICD-10-CM

## 2022-07-25 PROCEDURE — 74174 CTA ABD&PLVS W/CONTRAST: CPT

## 2022-07-25 PROCEDURE — 70450 CT HEAD/BRAIN W/O DYE: CPT

## 2022-07-25 PROCEDURE — 255N000002 HC RX 255 OP 636: Performed by: INTERNAL MEDICINE

## 2022-07-25 RX ADMIN — IOHEXOL 75 ML: 350 INJECTION, SOLUTION INTRAVENOUS at 13:08

## 2022-07-27 DIAGNOSIS — R42 DIZZINESS: Primary | ICD-10-CM

## 2022-08-08 ENCOUNTER — TELEPHONE (OUTPATIENT)
Dept: CARDIOLOGY | Facility: CLINIC | Age: 87
End: 2022-08-08

## 2022-08-08 DIAGNOSIS — I71.40 ABDOMINAL AORTIC ANEURYSM (AAA) WITHOUT RUPTURE (H): Primary | ICD-10-CM

## 2022-08-08 NOTE — TELEPHONE ENCOUNTER
----- Message from Martha Plascencia MD sent at 8/5/2022  4:14 PM CDT -----  Hi - can you help get a follow up CTA abdomen scheduled in 6 months?  Thanks, EG    Hi, your CT scan was done to see if there had been any change in the aortic aneursym in your belly.  The aneurysm has grown a little bit.  We should repeat at CT scan again in 6 months.  If it continues to grow over that time then I would like you to see and follow up with one of my vascular colleagues, Dr. Hartman. My team will help to get you scheduled for the CT scan in 6 months.     Dr. TOSCANO

## 2022-08-08 NOTE — TELEPHONE ENCOUNTER
"Per patient's daughter, Rosemarie Bruce has ongoing dizziness and nausea about 2 to 3 times per week that lands her on the couch for hours.  Informed Janis the abd CT is for intermittent surveillance of her abdominal aneurysm.   Janis is uncertaing if her Afib is associated with symptoms.   Encouraged her to follow up with patient's primary cardiologist in [place].   Janis states she would like Dr. Plascencia to be her primary cardiologist.  Called patient for symptoms assessment during times of dizziness and nausea.    Patient was evaluated by her PCP and had a head CT for symptoms. [facility] for follow up on head CT results but patient was not able to follow up with appointments because she does not drive. Her daughter is very busy with mammogram and such.  Patient states that she really does not have dizziness and nausea it is more that she loses her balance. She was evaluated in the dizziness and balance center but was not able to tolerate the therapy.  Rosemarie reports \"something is going on in head\" and gabapentin is working to keep the symptoms at bay.  Patient denies palpitations, lightheadedness, chest tightness/pain, heart racing, shortness of breath and syncope. She states her body is fine, her heart is fine and she is taking all of her heart medications. She feels she is going out of her mind and knows something is wrong in her head.   Patient has been out of her thyroid medication and gabapentin for 4 days and is not sure how to get these from the pharmacy. Instructed patient to contact her PCP and her daughter at once for these medications.         "

## 2022-08-08 NOTE — TELEPHONE ENCOUNTER
Health Call Center    Phone Message    May a detailed message be left on voicemail: yes     Reason for Call: Other: Rosemarie is experiencing worsening dizziness and nausea. Her daughter Yesi is requesting a call back about these symptoms and to further discuss her mother's plan of care. Please reach her at [phone]. Thank you!    Action Taken: Other: Cardiology    Travel Screening: Not Applicable

## 2022-08-25 ENCOUNTER — OFFICE VISIT (OUTPATIENT)
Dept: CARDIOLOGY | Facility: CLINIC | Age: 87
End: 2022-08-25
Attending: INTERNAL MEDICINE
Payer: COMMERCIAL

## 2022-08-25 VITALS
SYSTOLIC BLOOD PRESSURE: 132 MMHG | HEIGHT: 62 IN | WEIGHT: 169 LBS | DIASTOLIC BLOOD PRESSURE: 58 MMHG | RESPIRATION RATE: 16 BRPM | BODY MASS INDEX: 31.1 KG/M2

## 2022-08-25 DIAGNOSIS — I48.20 CHRONIC ATRIAL FIBRILLATION (H): ICD-10-CM

## 2022-08-25 DIAGNOSIS — I71.40 ABDOMINAL AORTIC ANEURYSM (AAA) WITHOUT RUPTURE (H): ICD-10-CM

## 2022-08-25 PROCEDURE — 99215 OFFICE O/P EST HI 40 MIN: CPT | Performed by: INTERNAL MEDICINE

## 2022-08-25 NOTE — PROGRESS NOTES
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 I had reviewed with vascular surgery and they recommended follow up for now. If it continues to grow on the images in 6 months I will have her see them for further follow up.  She has no abdominal pain.  Will work to intensify her lipid management.    Paroxysmal atrial fibrillation - on xarelto without bleeding issues.    Lipids - fasting check in the next few weeks.    F/U 6 months pending the CT result       History of Present Illness/Subjective    HPI: Rosemarie Fry is a 87 year old female from [place] with atrial fibrillation on xarelto and metoprolol, hypothyroidism, bronchitis, sinusitis, venous insufficiency and small airway disease. She previously followed at [facility] in [place]. MRI spine reported an incidental aneurysm: \"There appears to be a focal posterior-laterally directed outpouching of the right aspect of the infrarenal abdominal aorta concerning for abdominal aortic aneurysm. At this level, the aorta appears to measure over 3 cm in diameter. This was also present at least to some degree on the prior examination.\" A subsequent abdominal US was normal.  The daughter also notes that previous echos have Reported a dilated ascending aorta of 3.9-4.0 cm reported as \"mild-moderately\" enlarged. Most recent CTA 7/22 reports a 2.3 x 2.0 saccular aneurysm in the abdominal aorta that had grown 1-2 cm over the past 6 months (see report below).    Rosemarie returns to clinic today with her daughter. She had been having very bothersome dizziness and started gabapentin, which has completely resolved the issue. CT head did show a borderline enlarged pituitary gland and she has an upcomming appointment with neurology about that. Rosemarie remains very active in her home and yard and feels " "well. There are no palpitations unless she forgets to take her medications. She denies any chest or belly pain, dyspnea, pnd/orhtopnea, edema, or syncope.      CTA 7/2022:  1.  There is a 1-2 mm increase in a saccular aneurysmal area to the right of the mid infrarenal abdominal aorta likely related to ulcerated plaque given the location outside the calcified vessel wall. Stable ectasia adjacent to this area. The sac-like area is 2.3 x 2.0 cm including the thrombosed component. The maximum diameter of the entire aorta including this area is 3.4 cm. Continued follow-up would be appropriate.  2.  Hypoenhancement of the upper right kidney may represent focal arterial insufficiency, similar to the prior examination.  3.  Stable cystic left adnexal mass with calcification. Stability is reassuring.    Recent Echocardiogram Results: 8/2019 [facility]   Normal left ventricular size and ejection fraction. Mild left ventricular hypertrophy.    Visually estimated ejection fraction is 60-65%.    Moderate left atrial enlargement.    Aortic valve sclerosis without significant stenosis. No aortic regurgitation.    Mitral annular calcification. Trace mitral regurgitation.  No significant mitral stenosis.    Aortic root measures 3.9cm, ascending aorta measures 3.9cm.    Estimated EF: 60-65%            Physical Examination  Review of Systems   Vitals: /58 (BP Location: Left arm, Patient Position: Sitting, Cuff Size: Adult Regular)   Resp 16   Ht 1.575 m (5' 2\")   Wt 76.7 kg (169 lb)   BMI 30.91 kg/m    BMI= Body mass index is 30.91 kg/m .  Wt Readings from Last 3 Encounters:   08/25/22 76.7 kg (169 lb)   07/06/22 77.9 kg (171 lb 12.8 oz)   04/23/22 74.8 kg (165 lb)       General Appearance:   no distress, overweight body habitus   ENT/Mouth: membranes moist, no oral lesions or bleeding gums.      EYES:  no scleral icterus, normal conjunctivae   Neck: no carotid bruits or thyromegaly   Chest/Lungs:   lungs are clear to " auscultation, no rales or wheezing, no sternal scar, equal chest wall expansion    Cardiovascular:   Regular. Normal first and second heart sounds with no murmur. No rubs or gallops; the right carotid, radial and posterior tibial pulses are intact and the left carotid, radial and posterior tibial pulses are intact.  Jugular venous pressure is flat, no edema bilaterally    Abdomen:  no organomegaly, masses, bruits, or tenderness; bowel sounds are present   Extremities: no cyanosis or clubbing   Skin: no xanthelasma, warm.    Neurologic: normal  bilateral, no tremors     Psychiatric: alert and oriented x3, calm        Please refer above for cardiac ROS details.        Medical History  Surgical History Family History Social History   Past Medical History:   Diagnosis Date     Allergic rhinitis due to animal dander      Atrial fibrillation (H)     Xarelto     Cataract, mild-mod, ou 5/17/2015     Diagnostic skin and sensitization tests (aka ALLERGENS)     Skin test 5/5/10 pos. for:  cat/dog/T/RW     Ex-smoker      Lyme disease 1999     [place]     Osteoporosis      Seasonal allergic rhinitis skin test 5/5/10 pos. for:  cat/dog/T/RW     Past Surgical History:   Procedure Laterality Date     HYSTERECTOMY       LAPAROSCOPIC ASSISTED HYSTERECTOMY VAGINAL       ZZC APPENDECTOMY       Family History   Problem Relation Age of Onset     Diabetes Brother      Hypertension Brother      Asthma Daughter      Cancer No family hx of      Cerebrovascular Disease No family hx of      Thyroid Disease No family hx of      Glaucoma No family hx of      Macular Degeneration No family hx of         Social History     Socioeconomic History     Marital status:      Spouse name: Not on file     Number of children: 5     Years of education: 8     Highest education level: Not on file   Occupational History     Occupation: [employer]     Employer: [employer]     Comment: 15 years   Tobacco Use     Smoking status: Former Smoker     Packs/day:  1.00     Years: 10.00     Pack years: 10.00     Types: Cigarettes     Quit date: 4/12/2007     Years since quitting: 15.3     Smokeless tobacco: Never Used   Vaping Use     Vaping Use: Never used   Substance and Sexual Activity     Alcohol use: No     Alcohol/week: 0.0 standard drinks     Drug use: No     Sexual activity: Never     Birth control/protection: Surgical     Comment: PARTIAL HYST   Other Topics Concern     Parent/sibling w/ CABG, MI or angioplasty before 65F 55M? Not Asked   Social History Narrative     Not on file     Social Determinants of Health     Financial Resource Strain: Not on file   Food Insecurity: Not on file   Transportation Needs: Not on file   Physical Activity: Not on file   Stress: Not on file   Social Connections: Not on file   Intimate Partner Violence: Not on file   Housing Stability: Not on file           Medications  Allergies   Current Outpatient Medications   Medication Sig Dispense Refill     acetaminophen (TYLENOL) 325 MG tablet Take 325 mg by mouth every 6 hours as needed for mild pain       calcium-vitamin D-vitamin K (VIACTIV) 500-500-40 MG-UNT-MCG CHEW Take 1 tablet by mouth daily       levothyroxine (SYNTHROID/LEVOTHROID) 50 MCG tablet Take 1 tablet (50 mcg) by mouth daily 90 tablet 0     metoprolol succinate ER (TOPROL XL) 25 MG 24 hr tablet Take 2 tablets (50 mg) by mouth daily       nitroGLYcerin (NITROSTAT) 0.4 MG sublingual tablet Place 1 tablet (0.4 mg) under the tongue every 5 minutes as needed for chest pain If you are still having symptoms after 3 doses (15 minutes) call 911. 25 tablet 0     rivaroxaban ANTICOAGULANT (XARELTO) 20 MG TABS tablet Take 20 mg by mouth every morning       vitamin B-2 (RIBOFLAVIN) 25 MG TABS tablet Take 1 tablet by mouth daily         Allergies   Allergen Reactions     Sulfa Drugs Rash          Lab Results    Chemistry/lipid CBC Cardiac Enzymes/BNP/TSH/INR   Recent Labs   Lab Test 12/11/18  1318   CHOL 220*   HDL 44*   *   TRIG  218*     Recent Labs   Lab Test 12/11/18  1318 02/17/16  1621   * 141*     Recent Labs   Lab Test 07/06/22  1658      POTASSIUM 5.2   CHLORIDE 108   CO2 27   GLC 97   BUN 39*   CR 1.14*   GFRESTIMATED 46*   ANGELITO 9.6     Recent Labs   Lab Test 07/06/22  1658 04/23/22  1123 01/17/22  1510   CR 1.14* 1.01 1.0     No results for input(s): A1C in the last 80481 hours.       Recent Labs   Lab Test 07/06/22  1658   WBC 6.3   HGB 12.3   HCT 37.6   *        Recent Labs   Lab Test 07/06/22  1658 04/23/22  1123 06/29/20  1140   HGB 12.3 12.9 12.3    Recent Labs   Lab Test 04/23/22  1123   TROPONINI <0.01     Recent Labs   Lab Test 04/23/22  1123 05/01/19  1450 05/21/18  1437   *  --   --    NTBNP  --  86 105     Recent Labs   Lab Test 06/29/20  1140   TSH 5.56*     No results for input(s): INR in the last 88056 hours.     Today's clinic visit entailed:  Review of prior external note(s) from - [facility] information from epic reviewed  40 minutes spent on the date of the encounter doing chart review, review of outside records, review of test results, interpretation of tests, patient visit and documentation   Provider  Link to [facility] Help Grid     The level of medical decision making during this visit was of moderate complexity.        Martha Plascencia MD

## 2022-08-25 NOTE — PATIENT INSTRUCTIONS
It was a pleasure seeing you at [facility] today.        Here are my suggestions for your care:    1. Repeat a CT angiogram of the abdomen in 6 months    2. Call if you develop any abdominal pain       Let's meet again in 12 months.    You can always call my nurse Taylor Calzada RN who is a nurse helping me in the care of my patients. She can be reached at [phone] if you have any questions.    For scheduling, please call my  Negar Cabrales at [phone].    Thank you again for trusting me with your care. Please feel free to call my office at any time if you have any question or if I can assist you in any way.    Martha Plascencia MD  [facility]

## 2022-08-25 NOTE — LETTER
"8/25/2022    Yessy Juarez, APRN CNP  [address] [place] [address]    RE: Rosemarie Fry       Dear Colleague,     I had the pleasure of seeing Rosemarie Fry in the [facility].    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 I had reviewed with vascular surgery and they recommended follow up for now. If it continues to grow on the images in 6 months I will have her see them for further follow up.  She has no abdominal pain.  Will work to intensify her lipid management.    Paroxysmal atrial fibrillation - on xarelto without bleeding issues.    Lipids - fasting check in the next few weeks.    F/U 6 months pending the CT result       History of Present Illness/Subjective    HPI: Rosemarie Fry is a 87 year old female from [place] with atrial fibrillation on xarelto and metoprolol, hypothyroidism, bronchitis, sinusitis, venous insufficiency and small airway disease. She previously followed at [facility] in [place]. MRI spine reported an incidental aneurysm: \"There appears to be a focal posterior-laterally directed outpouching of the right aspect of the infrarenal abdominal aorta concerning for abdominal aortic aneurysm. At this level, the aorta appears to measure over 3 cm in diameter. This was also present at least to some degree on the prior examination.\" A subsequent abdominal US was normal.  The daughter also notes that previous echos have Reported a dilated ascending aorta of 3.9-4.0 cm reported as \"mild-moderately\" enlarged. Most recent CTA 7/22 reports a 2.3 x 2.0 saccular aneurysm in the abdominal aorta that had grown 1-2 cm over the past 6 months (see report below).    Rosemarie returns to clinic today with her daughter. She had been having very bothersome " "dizziness and started gabapentin, which has completely resolved the issue. CT head did show a borderline enlarged pituitary gland and she has an upcomming appointment with neurology about that. Rosemarie remains very active in her home and yard and feels well. There are no palpitations unless she forgets to take her medications. She denies any chest or belly pain, dyspnea, pnd/orhtopnea, edema, or syncope.      CTA 7/2022:  1.  There is a 1-2 mm increase in a saccular aneurysmal area to the right of the mid infrarenal abdominal aorta likely related to ulcerated plaque given the location outside the calcified vessel wall. Stable ectasia adjacent to this area. The sac-like area is 2.3 x 2.0 cm including the thrombosed component. The maximum diameter of the entire aorta including this area is 3.4 cm. Continued follow-up would be appropriate.  2.  Hypoenhancement of the upper right kidney may represent focal arterial insufficiency, similar to the prior examination.  3.  Stable cystic left adnexal mass with calcification. Stability is reassuring.    Recent Echocardiogram Results: 8/2019 [facility]   Normal left ventricular size and ejection fraction. Mild left ventricular hypertrophy.    Visually estimated ejection fraction is 60-65%.    Moderate left atrial enlargement.    Aortic valve sclerosis without significant stenosis. No aortic regurgitation.    Mitral annular calcification. Trace mitral regurgitation.  No significant mitral stenosis.    Aortic root measures 3.9cm, ascending aorta measures 3.9cm.    Estimated EF: 60-65%            Physical Examination  Review of Systems   Vitals: /58 (BP Location: Left arm, Patient Position: Sitting, Cuff Size: Adult Regular)   Resp 16   Ht 1.575 m (5' 2\")   Wt 76.7 kg (169 lb)   BMI 30.91 kg/m    BMI= Body mass index is 30.91 kg/m .  Wt Readings from Last 3 Encounters:   08/25/22 76.7 kg (169 lb)   07/06/22 77.9 kg (171 lb 12.8 oz)   04/23/22 74.8 kg (165 lb)       General " Appearance:   no distress, overweight body habitus   ENT/Mouth: membranes moist, no oral lesions or bleeding gums.      EYES:  no scleral icterus, normal conjunctivae   Neck: no carotid bruits or thyromegaly   Chest/Lungs:   lungs are clear to auscultation, no rales or wheezing, no sternal scar, equal chest wall expansion    Cardiovascular:   Regular. Normal first and second heart sounds with no murmur. No rubs or gallops; the right carotid, radial and posterior tibial pulses are intact and the left carotid, radial and posterior tibial pulses are intact.  Jugular venous pressure is flat, no edema bilaterally    Abdomen:  no organomegaly, masses, bruits, or tenderness; bowel sounds are present   Extremities: no cyanosis or clubbing   Skin: no xanthelasma, warm.    Neurologic: normal  bilateral, no tremors     Psychiatric: alert and oriented x3, calm        Please refer above for cardiac ROS details.        Medical History  Surgical History Family History Social History   Past Medical History:   Diagnosis Date     Allergic rhinitis due to animal dander      Atrial fibrillation (H)     Xarelto     Cataract, mild-mod, ou 5/17/2015     Diagnostic skin and sensitization tests (aka ALLERGENS)     Skin test 5/5/10 pos. for:  cat/dog/T/RW     Ex-smoker      Lyme disease 1999     [place]     Osteoporosis      Seasonal allergic rhinitis skin test 5/5/10 pos. for:  cat/dog/T/RW     Past Surgical History:   Procedure Laterality Date     HYSTERECTOMY       LAPAROSCOPIC ASSISTED HYSTERECTOMY VAGINAL       ZZC APPENDECTOMY       Family History   Problem Relation Age of Onset     Diabetes Brother      Hypertension Brother      Asthma Daughter      Cancer No family hx of      Cerebrovascular Disease No family hx of      Thyroid Disease No family hx of      Glaucoma No family hx of      Macular Degeneration No family hx of         Social History     Socioeconomic History     Marital status:      Spouse name: Not on file      Number of children: 5     Years of education: 8     Highest education level: Not on file   Occupational History     Occupation: [employer]     Employer: [employer]     Comment: 15 years   Tobacco Use     Smoking status: Former Smoker     Packs/day: 1.00     Years: 10.00     Pack years: 10.00     Types: Cigarettes     Quit date: 4/12/2007     Years since quitting: 15.3     Smokeless tobacco: Never Used   Vaping Use     Vaping Use: Never used   Substance and Sexual Activity     Alcohol use: No     Alcohol/week: 0.0 standard drinks     Drug use: No     Sexual activity: Never     Birth control/protection: Surgical     Comment: PARTIAL HYST   Other Topics Concern     Parent/sibling w/ CABG, MI or angioplasty before 65F 55M? Not Asked   Social History Narrative     Not on file     Social Determinants of Health     Financial Resource Strain: Not on file   Food Insecurity: Not on file   Transportation Needs: Not on file   Physical Activity: Not on file   Stress: Not on file   Social Connections: Not on file   Intimate Partner Violence: Not on file   Housing Stability: Not on file           Medications  Allergies   Current Outpatient Medications   Medication Sig Dispense Refill     acetaminophen (TYLENOL) 325 MG tablet Take 325 mg by mouth every 6 hours as needed for mild pain       calcium-vitamin D-vitamin K (VIACTIV) 500-500-40 MG-UNT-MCG CHEW Take 1 tablet by mouth daily       levothyroxine (SYNTHROID/LEVOTHROID) 50 MCG tablet Take 1 tablet (50 mcg) by mouth daily 90 tablet 0     metoprolol succinate ER (TOPROL XL) 25 MG 24 hr tablet Take 2 tablets (50 mg) by mouth daily       nitroGLYcerin (NITROSTAT) 0.4 MG sublingual tablet Place 1 tablet (0.4 mg) under the tongue every 5 minutes as needed for chest pain If you are still having symptoms after 3 doses (15 minutes) call 911. 25 tablet 0     rivaroxaban ANTICOAGULANT (XARELTO) 20 MG TABS tablet Take 20 mg by mouth every morning       vitamin B-2 (RIBOFLAVIN) 25 MG TABS tablet  Take 1 tablet by mouth daily         Allergies   Allergen Reactions     Sulfa Drugs Rash          Lab Results    Chemistry/lipid CBC Cardiac Enzymes/BNP/TSH/INR   Recent Labs   Lab Test 12/11/18  1318   CHOL 220*   HDL 44*   *   TRIG 218*     Recent Labs   Lab Test 12/11/18  1318 02/17/16  1621   * 141*     Recent Labs   Lab Test 07/06/22  1658      POTASSIUM 5.2   CHLORIDE 108   CO2 27   GLC 97   BUN 39*   CR 1.14*   GFRESTIMATED 46*   ANGELITO 9.6     Recent Labs   Lab Test 07/06/22  1658 04/23/22  1123 01/17/22  1510   CR 1.14* 1.01 1.0     No results for input(s): A1C in the last 90957 hours.       Recent Labs   Lab Test 07/06/22  1658   WBC 6.3   HGB 12.3   HCT 37.6   *        Recent Labs   Lab Test 07/06/22  1658 04/23/22  1123 06/29/20  1140   HGB 12.3 12.9 12.3    Recent Labs   Lab Test 04/23/22  1123   TROPONINI <0.01     Recent Labs   Lab Test 04/23/22  1123 05/01/19  1450 05/21/18  1437   *  --   --    NTBNP  --  86 105     Recent Labs   Lab Test 06/29/20  1140   TSH 5.56*     No results for input(s): INR in the last 19385 hours.     Today's clinic visit entailed:  [facility] reviewed  40 minutes spent on the date of the encounter doing chart review, review of outside records, review of test results, interpretation of tests, patient visit and documentation   Provider  Link to [facility] Help Grid     The level of medical decision making during this visit was of moderate complexity.        Martha Plascencia MD              Thank you for allowing me to participate in the care of your patient.      Sincerely,     Martha Plascencia MD     [facility]  cc:   Martha Plascencia MD  [address]  [place] [address]

## 2022-09-04 NOTE — TELEPHONE ENCOUNTER
FUTURE VISIT INFORMATION      FUTURE VISIT INFORMATION:    Date: 10/19/2022    Time: 7am    [place] [facility]  REFERRAL INFORMATION:    Referring provider:  Yessy JIMENEZ CNP     Referring providers clinic:  [facility] [place]     Reason for visit/diagnosis  Dizziness     RECORDS REQUESTED FROM:       Clinic name Comments Records Status Imaging Status   Internal A. [place]-7/6/2022    CT Head-7/25/2022 Epic PACS

## 2022-09-19 ENCOUNTER — OFFICE VISIT (OUTPATIENT)
Dept: PALLIATIVE MEDICINE | Facility: CLINIC | Age: 87
End: 2022-09-19
Attending: NURSE PRACTITIONER
Payer: COMMERCIAL

## 2022-09-19 VITALS — SYSTOLIC BLOOD PRESSURE: 124 MMHG | DIASTOLIC BLOOD PRESSURE: 84 MMHG | HEART RATE: 54 BPM

## 2022-09-19 DIAGNOSIS — M54.50 CHRONIC BILATERAL LOW BACK PAIN WITHOUT SCIATICA: ICD-10-CM

## 2022-09-19 DIAGNOSIS — M51.369 DDD (DEGENERATIVE DISC DISEASE), LUMBAR: ICD-10-CM

## 2022-09-19 DIAGNOSIS — G89.29 CHRONIC BILATERAL LOW BACK PAIN WITHOUT SCIATICA: ICD-10-CM

## 2022-09-19 DIAGNOSIS — M47.816 SPONDYLOSIS OF LUMBAR REGION WITHOUT MYELOPATHY OR RADICULOPATHY: Primary | ICD-10-CM

## 2022-09-19 DIAGNOSIS — M48.061 LUMBAR FORAMINAL STENOSIS: ICD-10-CM

## 2022-09-19 PROCEDURE — 99204 OFFICE O/P NEW MOD 45 MIN: CPT | Performed by: PAIN MEDICINE

## 2022-09-19 RX ORDER — GABAPENTIN 100 MG/1
100 CAPSULE ORAL 3 TIMES DAILY
COMMUNITY
End: 2022-09-20

## 2022-09-19 ASSESSMENT — ANXIETY QUESTIONNAIRES
3. WORRYING TOO MUCH ABOUT DIFFERENT THINGS: NOT AT ALL
4. TROUBLE RELAXING: NOT AT ALL
6. BECOMING EASILY ANNOYED OR IRRITABLE: NOT AT ALL
IF YOU CHECKED OFF ANY PROBLEMS ON THIS QUESTIONNAIRE, HOW DIFFICULT HAVE THESE PROBLEMS MADE IT FOR YOU TO DO YOUR WORK, TAKE CARE OF THINGS AT HOME, OR GET ALONG WITH OTHER PEOPLE: NOT DIFFICULT AT ALL
1. FEELING NERVOUS, ANXIOUS, OR ON EDGE: NOT AT ALL
5. BEING SO RESTLESS THAT IT IS HARD TO SIT STILL: NOT AT ALL
7. FEELING AFRAID AS IF SOMETHING AWFUL MIGHT HAPPEN: NOT AT ALL
7. FEELING AFRAID AS IF SOMETHING AWFUL MIGHT HAPPEN: NOT AT ALL
GAD7 TOTAL SCORE: 0
8. IF YOU CHECKED OFF ANY PROBLEMS, HOW DIFFICULT HAVE THESE MADE IT FOR YOU TO DO YOUR WORK, TAKE CARE OF THINGS AT HOME, OR GET ALONG WITH OTHER PEOPLE?: NOT DIFFICULT AT ALL
2. NOT BEING ABLE TO STOP OR CONTROL WORRYING: NOT AT ALL
GAD7 TOTAL SCORE: 0

## 2022-09-19 ASSESSMENT — ENCOUNTER SYMPTOMS
STIFFNESS: 0
BACK PAIN: 1
MUSCLE WEAKNESS: 0
SMELL DISTURBANCE: 0
SORE THROAT: 0
PALPITATIONS: 0
DOUBLE VISION: 0
HOARSE VOICE: 1
LIGHT-HEADEDNESS: 1
LEG PAIN: 0
MUSCLE CRAMPS: 0
ARTHRALGIAS: 0
NECK MASS: 0
HYPERTENSION: 0
EYE REDNESS: 0
SYNCOPE: 0
SINUS CONGESTION: 0
HYPOTENSION: 1
EYE PAIN: 1
EYE IRRITATION: 1
FLANK PAIN: 0
MYALGIAS: 1
DIFFICULTY URINATING: 0
HEMATURIA: 0
DYSURIA: 0
EXERCISE INTOLERANCE: 0
SINUS PAIN: 1
TROUBLE SWALLOWING: 0
JOINT SWELLING: 0
ORTHOPNEA: 0
NECK PAIN: 0
SLEEP DISTURBANCES DUE TO BREATHING: 0
EYE WATERING: 0
TASTE DISTURBANCE: 0

## 2022-09-19 ASSESSMENT — PAIN SCALES - PAIN ENJOYMENT GENERAL ACTIVITY SCALE (PEG)
INTERFERED_GENERAL_ACTIVITY: 9
INTERFERED_ENJOYMENT_LIFE: 9
PEG_TOTALSCORE: 9.33
AVG_PAIN_PASTWEEK: 10
AVG_PAIN_PASTWEEK: 10 - PAIN AS BAD AS YOU CAN IMAGINE
PEG_TOTALSCORE: 9.33
INTERFERED_ENJOYMENT_LIFE: 9
INTERFERED_GENERAL_ACTIVITY: 9

## 2022-09-19 ASSESSMENT — PAIN SCALES - GENERAL: PAINLEVEL: SEVERE PAIN (6)

## 2022-09-19 NOTE — PROGRESS NOTES
[facility] Interventional eval     Date of visit: 9/19/2022    Reason for consultation:    Primary Care Provider is Yessy Juarez.  Pain medications are being prescribed by n/a.    Please see the [facility] health questionnaire which the patient completed and reviewed with me in detail.    Chief Complaint:    Chief Complaint   Patient presents with     Pain     MME prescribed prior to seeing patient:  Current MME:    Pain history: on anticoag   Rosemarie Fry is a 87 year old female who first started having problems with pain chronic  Denies inciting event  The pain is constant  The pain is getting worse   The pain is mainly axial   The pain rad across her back   The pt denies numb ting burning  The pain does not radiate to her le  The pain is worse with walking   The pain is worse with prolonged bending  Pain worse with doing her kitchen work   The pt reports benefit when gets on the floor  Benefit with rest  Benefit with a rambo  Of note has issues with balance  Denies any recent falls  Denies any overt weakness  Denies any new incontinence    Pain rating: intensity  Averages 6/10 on a 0-10 scale.    Current treatments include: on anticoag  john    Previous medication treatments included:  n/a    Other treatments have included:    Injections: lumbar facet injection    Past Medical History:  Past Medical History:   Diagnosis Date     Allergic rhinitis due to animal dander      Atrial fibrillation (H)     Xarelto     Cataract, mild-mod, ou 5/17/2015     Diagnostic skin and sensitization tests (aka ALLERGENS)     Skin test 5/5/10 pos. for:  cat/dog/T/RW     Ex-smoker      Lyme disease 1999     Giancarlo     Osteoporosis      Seasonal allergic rhinitis skin test 5/5/10 pos. for:  cat/dog/T/RW     Past Surgical History:  Past Surgical History:   Procedure Laterality Date     HYSTERECTOMY       LAPAROSCOPIC ASSISTED HYSTERECTOMY VAGINAL       ZZC APPENDECTOMY        Medications:  Current Outpatient Medications   Medication Sig Dispense Refill     acetaminophen (TYLENOL) 325 MG tablet Take 325 mg by mouth every 6 hours as needed for mild pain       calcium-vitamin D-vitamin K (VIACTIV) 500-500-40 MG-UNT-MCG CHEW Take 1 tablet by mouth daily       gabapentin (NEURONTIN) 100 MG capsule Take 100 mg by mouth 3 times daily       levothyroxine (SYNTHROID/LEVOTHROID) 50 MCG tablet Take 1 tablet (50 mcg) by mouth daily 90 tablet 0     metoprolol succinate ER (TOPROL XL) 25 MG 24 hr tablet Take 2 tablets (50 mg) by mouth daily       rivaroxaban ANTICOAGULANT (XARELTO) 20 MG TABS tablet Take 20 mg by mouth every morning       vitamin B-2 (RIBOFLAVIN) 25 MG TABS tablet Take 1 tablet by mouth daily       nitroGLYcerin (NITROSTAT) 0.4 MG sublingual tablet Place 1 tablet (0.4 mg) under the tongue every 5 minutes as needed for chest pain If you are still having symptoms after 3 doses (15 minutes) call 911. (Patient not taking: Reported on 9/19/2022) 25 tablet 0     Allergies:     Allergies   Allergen Reactions     Sulfa Drugs Rash     Social History:  Home situation: home    History of chemical dependency treatment: no    Family history:  Family History   Problem Relation Age of Onset     Diabetes Brother      Hypertension Brother      Asthma Daughter      Cancer No family hx of      Cerebrovascular Disease No family hx of      Thyroid Disease No family hx of      Glaucoma No family hx of      Macular Degeneration No family hx of      Family history of headaches: n    Review of Systems:  Skin: negative  Eyes: negative  Ears/Nose/Throat: negative  Respiratory: No shortness of breath, dyspnea on exertion, cough, or hemoptysis  Cardiovascular: negative  Gastrointestinal: negative  Genitourinary: negative  Musculoskeletal: negative  Neurologic: negative  Psychiatric: negative  Hematologic/Lymphatic/Immunologic: negative  Endocrine: negative    Physical Exam:  Vitals:    09/19/22 1255   BP:  124/84   Pulse: 54     Exam:  Constitutional: healthy, alert and no distress    Respiratory: Speaking in full sentences no accessory muscles use     Psychiatric: mentation appears normal and affect normal/bright    Musculoskeletal exam:  Gait/Station/Posture: antalgoc   Cervical spine: ROMwnl       Thoracic spine:  Normal     Lumbar spine:     ROM: dec   Myofascial tenderness:  Mi ld   Francisco's: +++++               Straight leg exam: neg   CASSIDY/FADIR: neg              SI: +    Greater trochanteric bursa: neg  Neurologic exam:    Motor:  5/5 UE and LE strength  Reflexes:        Achilles:  +2    Sensory:  (upper and lower extremities):   Light touch: normal    Allodynia: absent    Dysethesia: absent    Hyperalgesia: absent     Diagnostic tests:  MRreviewed      Assessment/Plan:  Rosemarie Fry is a 87 year old female who presents with the complaints of chronic axial lbp.   Rosemarie was seen today for pain.    Diagnoses and all orders for this visit:    Lumbar foraminal stenosis  -     Pain Management Referral    DDD (degenerative disc disease), lumbar  -     Pain Management Referral    Chronic bilateral low back pain without sciatica  -     Pain Management Referral         - Further procedures recommended:    - bilateral lumbar MEDIAL BRANCH BLOCK/rfa  l4-S1     - Follow up: For procedure       The total TIME spent on this patient on the day of the appointment was 22 minutes.   Time spent preparing to see the patient (reviewing records and tests)  Time spend face to face with the patient  Time spent ordering tests, medications, procedures and referrals  Time spent Referring and communicating with other healthcare professionals  Documenting clinical information in Epic    Madhav Wiley MD  [facility]  This note was created with voice recognition software, and while reviewed for accuracy, typos may remain.

## 2022-09-19 NOTE — PATIENT INSTRUCTIONS
- Further procedures recommended:    - bilateral lumbar MEDIAL BRANCH BLOCK/rfa  l4-S1     - Follow up: For procedure     ----------------------------------------------------------------  Clinic Number:  [phone]   Call with any questions about your care and for scheduling assistance.   Calls are returned Monday through Friday between 8 AM and 4:30 PM. We usually get back to you within 2 business days depending on the issue/request.    If we are prescribing your medications:  For opioid medication refills, call the clinic or send a [employer] message 7 days in advance.  Please include:  Name of requested medication  Name of the pharmacy.  For non-opioid medications, call your pharmacy directly to request a refill. Please allow 3-4 days to be processed.   Per [place] State Law:  All controlled substance prescriptions must be filled within 30 days of being written.    For those controlled substances allowing refills, pickup must occur within 30 days of last fill.      We believe regular attendance is key to your success in our program!    Any time you are unable to keep your appointment we ask that you call us at least 24 hours in advance to cancel.This will allow us to offer the appointment time to another patient.   Multiple missed appointments may lead to dismissal from the clinic.

## 2022-09-20 ENCOUNTER — VIRTUAL VISIT (OUTPATIENT)
Dept: FAMILY MEDICINE | Facility: CLINIC | Age: 87
End: 2022-09-20
Payer: COMMERCIAL

## 2022-09-20 DIAGNOSIS — M54.50 BILATERAL LOW BACK PAIN WITHOUT SCIATICA, UNSPECIFIED CHRONICITY: Primary | ICD-10-CM

## 2022-09-20 PROCEDURE — 99213 OFFICE O/P EST LOW 20 MIN: CPT | Mod: 95 | Performed by: NURSE PRACTITIONER

## 2022-09-20 RX ORDER — GABAPENTIN 100 MG/1
CAPSULE ORAL
Qty: 90 CAPSULE | Refills: 1 | Status: SHIPPED | OUTPATIENT
Start: 2022-09-20 | End: 2023-11-24

## 2022-09-20 ASSESSMENT — ENCOUNTER SYMPTOMS: BACK PAIN: 1

## 2022-09-20 NOTE — PROGRESS NOTES
"Rosemarie is a 87 year old who is being evaluated via a billable telephone visit.      What phone number would you like to be contacted at? [phone]  How would you like to obtain your AVS? Mail a copy    Assessment & Plan     Bilateral low back pain without sciatica, unspecified chronicity  Has been taking and tolerating well.   Updated prescription sent  Plan to continue to follow with pain management as well  - gabapentin (NEURONTIN) 100 MG capsule; Take 2 caps in AM and 1 cap during the afternoon/evening if needed    Prescription drug management  21 minutes spent on the date of the encounter doing chart review, history and exam, documentation and further activities per the note     BMI:   Estimated body mass index is 30.91 kg/m  as calculated from the following:    Height as of 8/25/22: 1.575 m (5' 2\").    Weight as of 8/25/22: 76.7 kg (169 lb).       No follow-ups on file.    TONY Ceja CNP  [facility]    Jael Houston is a 87 year old, presenting for the following health issues:  No chief complaint on file.      HPI     Medication Followup of Gabapentin    Taking Medication as prescribed: yes    Side Effects:  None    Medication Helping Symptoms:  Yes    Phone call visit completed due to COVID 19 outbreak. Feeling off balance. Has to use cane when goes outside for a walk. Has been on gabapentin in the past and feels like that has helped in the past to make the off balance not seem as strong. John helps to decrease the burning sensation in head. Taking 2 gabapentin in AM. The john helps to decrease the pain in lower back as well.       Review of Systems   Musculoskeletal: Positive for back pain.   Neurological:        Off balance           Objective             Physical Exam   healthy, alert and no distress  PSYCH: Alert and oriented times 3; coherent speech, normal   rate and volume, able to articulate logical thoughts, able   to abstract reason, no tangential thoughts, no " hallucinations   or delusions  Her affect is normal and pleasant  RESP: No cough, no audible wheezing, able to talk in full sentences  Remainder of exam unable to be completed due to telephone visits            Phone call duration: 19 minutes

## 2022-10-19 ENCOUNTER — OFFICE VISIT (OUTPATIENT)
Dept: NEUROLOGY | Facility: CLINIC | Age: 87
End: 2022-10-19
Attending: NURSE PRACTITIONER
Payer: COMMERCIAL

## 2022-10-19 ENCOUNTER — PRE VISIT (OUTPATIENT)
Dept: NEUROLOGY | Facility: CLINIC | Age: 87
End: 2022-10-19

## 2022-10-19 VITALS
HEART RATE: 70 BPM | DIASTOLIC BLOOD PRESSURE: 47 MMHG | WEIGHT: 168.3 LBS | BODY MASS INDEX: 30.78 KG/M2 | OXYGEN SATURATION: 97 % | SYSTOLIC BLOOD PRESSURE: 96 MMHG

## 2022-10-19 DIAGNOSIS — R41.3 MEMORY LOSS: Primary | ICD-10-CM

## 2022-10-19 DIAGNOSIS — R42 DIZZINESS: ICD-10-CM

## 2022-10-19 PROCEDURE — 99205 OFFICE O/P NEW HI 60 MIN: CPT | Performed by: PSYCHIATRY & NEUROLOGY

## 2022-10-19 ASSESSMENT — PAIN SCALES - GENERAL: PAINLEVEL: MODERATE PAIN (4)

## 2022-10-19 NOTE — PROGRESS NOTES
[facility] Neurology Consultation    Rosemarie Fry MRN# [id number]   Age: 87 year old YOB: 1934     Requesting physician: Yessy Lr     Reason for Consultation: dizziness      History of Presenting Symptoms:   Rosemarie Fry is a 87 year old female who presents today for evaluation of dizziness.      The patient has a pertinent medical history of hypothyroidism, GERD, COPD, cataracts, chronic Atrial fibrillation on anticoagulation, saccular abdominal aortic aneurysm (growth in 6 months stated 8/25/2022 with cardiology), lower back pain from DDD (followed with pain clinic),and a recent COVID19 infection (4/2022).      She was seen with her PCP 7/6/2022 reporting 2-3 months of dizziness and sinus issus. She was feeling of balance, and it was impairing her ability to walk.  She reported a strange onset of symptoms occurring when eating, as this would cause head congestion.  The head congestion would then improve with tylenol and gabapentin.  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    Today, the patient is here with her daughter Janis.  The patient's first symptoms of tightness/pressure over her forehead.  She describes having a sinus infection that stimulated the first symptoms.  This may have been about 5 years ago.  Since that time, she has a continued pressure over her forehead.  She mentions having recurrent sinuses infections which cause the same symptoms to return, but then also that there is a steady fogginess/pressure over her forehead without infection.  There is no sharp or stabbing pain.  She has no history of migraine. She has no photophobia, phonophobia.  There is no tinnitus reported.  She describes inner-ear itching that is constant.  The pressure can get worse without eating, and improves when she eats food at noon.    She also has a separate feeling of imbalance. She uses  a cane at baseline.  She may have periods of falling against a wall when walking to the bathroom.  It is hard to catch herself.  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       Social History:   Lives independently. No history of alcohol abuse, no ongoing smoking.     Medications:   Gabapentin 200 mg QAM, 100 mg Qnoon/afternoon  Metoprolol  Levothyroxine  Rivaroxaban     Physical Exam:   Vitals: BP 96/47 (BP Location: Right arm, Patient Position: Sitting, Cuff Size: Adult Regular)   Pulse 70   Wt 76.3 kg (168 lb 4.8 oz)   SpO2 97%   BMI 30.78 kg/m     General: Seated comfortably in no acute distress.  HEENT: Neck supple with normal range of motion. No paracervical muscle tenderness or tightness. Full head motions without dizziness occurring.  Neurologic:     Mental Status: Fully alert, attentive and oriented. Speech clear and fluent, no paraphasic errors. MiniCog score of 2/3 (clock is appropriate but 0/3 recall with 3 words). Lists only 7 words in one minute that start with F. Luria testing to 3 repeats. Accurate personal historian according to daughter.     Cranial Nerves: Visual fields intact. PERRL. EOMI with normal smooth pursuit. Negative test of skew. No nystagmus with rapid eye deviation in either direction, or upon head impulse there is no delayed horizontal/vertical correction. Facial sensation intact/symmetric. Facial movements symmetric. Hearing not formally tested but intact to conversation. Palate elevation symmetric, uvula midline. No dysarthria. Shoulder shrug strong bilaterally. Tongue protrusion midline.     Motor: No tremors or other abnormal movements observed. Muscle tone normal throughout. No pronator drift. Normal/symmetric rapid finger tapping. Strength 5/5 throughout upper and lower extremities.     Deep Tendon Reflexes: 2+/symmetric throughout upper and lower extremities. No clonus. Toes downgoing bilaterally.     Sensory: Intact/symmetric to light  touch, pinprick, vibration and proprioception throughout upper and lower extremities. Negative Romberg.      Coordination: Finger-nose-finger without dysmetria. Rapid alternating movements intact/symmetric with normal speed and rhythm.     Gait: Stands quickly and smoothly. Starts and stops easily. Heels w/in 1-2 inches during swing phase. Good heel strike and foot raise.  Turns with 5-6 steps, seem hesitant but doesn't stumble or over-turn.  Tandem is poor, extremely hesitant, uses wall for balance.         Data: Pertinent prior to visit   Imaging:  Head CT: 7/25/2022  IMPRESSION:  1.  No CT evidence for acute intracranial process.  2.  Brain atrophy and presumed chronic microvascular ischemic changes as above.  3.  Slight interval increased prominence of the adenohypophysis from previous head CT 10/24/2016. Size is at top limits of normal, consider correlation to endocrine status if there is concern for pituitary hyperplasia or functional pituitary   macroadenoma. No mass effect upon the chiasm or infundibulum.  4.  Nothing for hemorrhage or acute/evolving ischemic changes are identified intracranially.         Assessment and Plan:   Assessment:  - Non-specific frontal head pressure  - Hesitant gait    The patient has non-specific head pressure of a banding pattern over her forehead, but this is not consistent with migraine or stress headache given the patient's report.  There are no visual symptoms, or abrupt vertiginous symptoms, or hearing impairment (tinnitus, loss of hearing) to indicate a compression of the optic chiasm, vestibular nerve compression, or possible venous involvement.  There is a high degree of correlation of her symptoms to not eating, and an improvement upon eating, so it is possible her non-specific head pressure is more in line with low blood sugar levels or changes in blood pressure.  There is little to suggest poor perfusion at this time, or some etiology of seizure given lack of focal  deficits. To better define whether there is a vascular etiology (stenosis, occlusion) leading to her symptoms, I think a CTA is required.   Her imbalance is not well defined with signs of brain insult on imaging, signs of neuropathy or ataxia on exam, and is most in line with hesitant gait. Her cognitive issues may be related to anxiety this AM, but they should still be investigated further given they are primarily related to memory/short term recall.  A MoCA would be advised initially, and a possible further w/up with MRI and neuropsychology could be considered pending the results.  Her findings of pituitary hyperplasia could align with non-specific fatigue, issues of controlling blood pressures, and I would recommend the patient's PCP consider endocrinology for further guidance on whether a pituitary focused MRI is needed along with hormone testing.       Plan:  - OT for MoCA  - CTA head and neck w/wout contrast  - Consider endocrinology consultation for further monitoring of pituitary hyperplasia or macroadenoma (likely would need MRI brain focused towards pituitary and serum hormone testing)    Follow up in Neurology clinic in 6 months, or should new concerns arise.    JAY Erickson D.O.   of Neurology    Total time today (67 min) in this patient encounter was spent on pre-charting, counseling and/or coordination of care.   The patient is in agreement with this plan and has no further questions.

## 2022-10-19 NOTE — LETTER
10/19/2022       RE: Rosemarie Fry  [id number] [facility] [id number]     Dear Colleague,    Thank you for referring your patient, Rosemarie Fry, to the [facility]. Please see a copy of my visit note below.    [facility] Neurology Consultation    Rosemarie Fry MRN# [id number]   Age: 87 year old YOB: 1934     Requesting physician: Yessy Lr     Reason for Consultation: dizziness      History of Presenting Symptoms:   Rosemarie Fry is a 87 year old female who presents today for evaluation of dizziness.      The patient has a pertinent medical history of hypothyroidism, GERD, COPD, cataracts, chronic Atrial fibrillation on anticoagulation, saccular abdominal aortic aneurysm (growth in 6 months stated 8/25/2022 with cardiology), lower back pain from DDD (followed with pain clinic),and a recent COVID19 infection (4/2022).      She was seen with her PCP 7/6/2022 reporting 2-3 months of dizziness and sinus issus. She was feeling of balance, and it was impairing her ability to walk.  She reported a strange onset of symptoms occurring when eating, as this would cause head congestion.  The head congestion would then improve with tylenol and gabapentin.  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    Today, the patient is here with her daughter Janis.  The patient's first symptoms of tightness/pressure over her forehead.  She describes having a sinus infection that stimulated the first symptoms.  This may have been about 5 years ago.  Since that time, she has a continued pressure over her forehead.  She mentions having recurrent sinuses infections which cause the same symptoms to return, but then also that there is a steady fogginess/pressure over her forehead without infection.  There is no  sharp or stabbing pain.  She has no history of migraine. She has no photophobia, phonophobia.  There is no tinnitus reported.  She describes inner-ear itching that is constant.  The pressure can get worse without eating, and improves when she eats food at noon.    She also has a separate feeling of imbalance. She uses a cane at baseline.  She may have periods of falling against a wall when walking to the bathroom.  It is hard to catch herself.  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       Social History:   Lives independently. No history of alcohol abuse, no ongoing smoking.     Medications:   Gabapentin 200 mg QAM, 100 mg Qnoon/afternoon  Metoprolol  Levothyroxine  Rivaroxaban     Physical Exam:   Vitals: BP 96/47 (BP Location: Right arm, Patient Position: Sitting, Cuff Size: Adult Regular)   Pulse 70   Wt 76.3 kg (168 lb 4.8 oz)   SpO2 97%   BMI 30.78 kg/m     General: Seated comfortably in no acute distress.  HEENT: Neck supple with normal range of motion. No paracervical muscle tenderness or tightness. Full head motions without dizziness occurring.  Neurologic:     Mental Status: Fully alert, attentive and oriented. Speech clear and fluent, no paraphasic errors. MiniCog score of 2/3 (clock is appropriate but 0/3 recall with 3 words). Lists only 7 words in one minute that start with F. Luria testing to 3 repeats. Accurate personal historian according to daughter.     Cranial Nerves: Visual fields intact. PERRL. EOMI with normal smooth pursuit. Negative test of skew. No nystagmus with rapid eye deviation in either direction, or upon head impulse there is no delayed horizontal/vertical correction. Facial sensation intact/symmetric. Facial movements symmetric. Hearing not formally tested but intact to conversation. Palate elevation symmetric, uvula midline. No dysarthria. Shoulder shrug strong bilaterally. Tongue protrusion midline.     Motor: No tremors or other abnormal  movements observed. Muscle tone normal throughout. No pronator drift. Normal/symmetric rapid finger tapping. Strength 5/5 throughout upper and lower extremities.     Deep Tendon Reflexes: 2+/symmetric throughout upper and lower extremities. No clonus. Toes downgoing bilaterally.     Sensory: Intact/symmetric to light touch, pinprick, vibration and proprioception throughout upper and lower extremities. Negative Romberg.      Coordination: Finger-nose-finger without dysmetria. Rapid alternating movements intact/symmetric with normal speed and rhythm.     Gait: Stands quickly and smoothly. Starts and stops easily. Heels w/in 1-2 inches during swing phase. Good heel strike and foot raise.  Turns with 5-6 steps, seem hesitant but doesn't stumble or over-turn.  Tandem is poor, extremely hesitant, uses wall for balance.         Data: Pertinent prior to visit   Imaging:  Head CT: 7/25/2022  IMPRESSION:  1.  No CT evidence for acute intracranial process.  2.  Brain atrophy and presumed chronic microvascular ischemic changes as above.  3.  Slight interval increased prominence of the adenohypophysis from previous head CT 10/24/2016. Size is at top limits of normal, consider correlation to endocrine status if there is concern for pituitary hyperplasia or functional pituitary   macroadenoma. No mass effect upon the chiasm or infundibulum.  4.  Nothing for hemorrhage or acute/evolving ischemic changes are identified intracranially.         Assessment and Plan:   Assessment:  - Non-specific frontal head pressure  - Hesitant gait    The patient has non-specific head pressure of a banding pattern over her forehead, but this is not consistent with migraine or stress headache given the patient's report.  There are no visual symptoms, or abrupt vertiginous symptoms, or hearing impairment (tinnitus, loss of hearing) to indicate a compression of the optic chiasm, vestibular nerve compression, or possible venous involvement.  There is a  high degree of correlation of her symptoms to not eating, and an improvement upon eating, so it is possible her non-specific head pressure is more in line with low blood sugar levels or changes in blood pressure.  There is little to suggest poor perfusion at this time, or some etiology of seizure given lack of focal deficits. To better define whether there is a vascular etiology (stenosis, occlusion) leading to her symptoms, I think a CTA is required.   Her imbalance is not well defined with signs of brain insult on imaging, signs of neuropathy or ataxia on exam, and is most in line with hesitant gait. Her cognitive issues may be related to anxiety this AM, but they should still be investigated further given they are primarily related to memory/short term recall.  A MoCA would be advised initially, and a possible further w/up with MRI and neuropsychology could be considered pending the results.  Her findings of pituitary hyperplasia could align with non-specific fatigue, issues of controlling blood pressures, and I would recommend the patient's PCP consider endocrinology for further guidance on whether a pituitary focused MRI is needed along with hormone testing.       Plan:  - OT for MoCA  - CTA head and neck w/wout contrast  - Consider endocrinology consultation for further monitoring of pituitary hyperplasia or macroadenoma (likely would need MRI brain focused towards pituitary and serum hormone testing)    Follow up in Neurology clinic in 6 months, or should new concerns arise.      Total time today (67 min) in this patient encounter was spent on pre-charting, counseling and/or coordination of care.   The patient is in agreement with this plan and has no further questions.        Again, thank you for allowing me to participate in the care of your patient.      Sincerely,    Clifton Erickson, DO

## 2022-10-19 NOTE — PATIENT INSTRUCTIONS
I would recommend a CTA of the head and neck to look into possible aneurysm or vessel stenosis leading to dizziness.    For cognitive concerns, I would recommend an occupational therapy assessment with a MoCA and follow up with your primary care doctor for continued monitoring.     For further evaluation of pituitary hyperplasia I would recommend ongoing management with an endocrinologist, as dysfunction of the pituitary may lead to issues of managing blood pressures, appetite, and fatigue.  You can discuss a referral with your primary care provider.

## 2022-11-01 ENCOUNTER — RADIOLOGY INJECTION OFFICE VISIT (OUTPATIENT)
Dept: PALLIATIVE MEDICINE | Facility: CLINIC | Age: 87
End: 2022-11-01
Attending: PAIN MEDICINE
Payer: COMMERCIAL

## 2022-11-01 VITALS
RESPIRATION RATE: 16 BRPM | HEART RATE: 131 BPM | OXYGEN SATURATION: 96 % | SYSTOLIC BLOOD PRESSURE: 123 MMHG | DIASTOLIC BLOOD PRESSURE: 95 MMHG

## 2022-11-01 DIAGNOSIS — M47.816 SPONDYLOSIS OF LUMBAR REGION WITHOUT MYELOPATHY OR RADICULOPATHY: ICD-10-CM

## 2022-11-01 PROCEDURE — 64493 INJ PARAVERT F JNT L/S 1 LEV: CPT | Mod: 50 | Performed by: PAIN MEDICINE

## 2022-11-01 PROCEDURE — 64494 INJ PARAVERT F JNT L/S 2 LEV: CPT | Mod: 50 | Performed by: PAIN MEDICINE

## 2022-11-01 ASSESSMENT — PAIN SCALES - GENERAL
PAINLEVEL: MODERATE PAIN (4)
PAINLEVEL: NO PAIN (0)

## 2022-11-01 NOTE — PROGRESS NOTES
Pre procedure Diagnosis:lumbosacral spondylosis   Post procedure Diagnosis: Same  Procedure performed: Bilateral L4-S1 medial branch block  Indication:  Diagnostic   Anesthesia: none  Complications:   Operators: Madhav Wiley MD   Indications:   Rosemarie Fry is a 87 year old female. The patient has a history of axial lbp.  Exam shows +++ and pain with extension/rotation, and they have tried conservative treatment including PHYSICAL THERAPY  and meds.    Options/alternatives, benefits and risks were discussed with the patient including but not limited to bleeding, infection, tissue trauma, exposure to radiation, reaction to medications, spinal cord injury, weakness, numbness and paralysis.  Questions were answered to her satisfaction and she agrees to proceed. Voluntary informed consent was obtained and signed.     Vitals were reviewed: Yes  Allergies were reviewed:  Yes   Medications were reviewed:  Yes   Pre-procedure pain score: 4/10    Procedure:  After obtaining signed informed consent, the patient was brought into the procedure suite and was placed in a prone position on the procedure table.   A Pause for the Cause was performed.  The patient was prepped and draped in the usual sterile fashion.     Under AP fluoroscopic guidance the L3, L4, L5 vertebral bodies were identified. The C-arm was rotated to the oblique view to afford optimal visualization the pedicles.  Lidocaine 1% was used to anesthetize the skin at each level.  Under intermittent fluoroscopy, 25G 3.5inch spinal needles were positioned inferior and lateral to the intersection of the transverse process and pedicle at the Bilateral L4 & L5 levels . The needle positions were verified and optimized from the AP view.    The anatomic targets for the L3 & L4 medial nerve and L5 dorsal ramus (which functionally incorporates the medial branch) were the  L4 & L5 transverse processes and sacral alar notch, with laterality as described above,  resulting in blockade of the L4/5 and L5/S1 facet joints.    Bupivacaine 0.25% 1 ml was injected at each location.  The needles were removed.      Hemostasis was achieved, the area was cleaned, and bandaids were placed when appropriate.  The patient tolerated the procedure well, and was taken to the recovery room.    Images were saved to PACS.     The patient will continue to monitor progress, and they were given a pain diary to complete at home.  They will either fax or mail this back to us or bring it to their next appointment. We will determine the treatment plan after we review the diary.      Post-procedure pain score: 0/10  Follow-up includes:   -f/u phone call in one week  -will await diary for further planning.    Madhav Wiley MD  [facility]

## 2022-11-01 NOTE — NURSING NOTE
Pre-procedure Intake  If YES to any questions or NO to having a   Please complete laminated checklist and leave on the computer keyboard for Provider, verbally inform provider if able.    For SCS Trial, RFA's or any sedation procedure:  Have you been fasting? Yes    If yes, for how long? NA    Are you taking any any blood thinners such as Coumadin, Warfarin, Jantoven, Pradaxa Xarelto, Eliquis, Edoxaban, Enoxaparin, Lovenox, Heparin, Arixtra, Fondaparinux, or Fragmin? OR Antiplatelet medication such as Plavix, Brilinta, or Effient?   Yes -   Other blood thinners     If yes, when did you take your last dose? 10/31/22    Do you take aspirin?  No    If cervical procedure, have you held aspirin for 6 days?   NA    Do you have any allergies to contrast dye, iodine, steroid and/or numbing medications?  NO    Are you currently taking antibiotics or have an active infection?  NO    Have you had a fever/elevated temperature within the past week? NO    Are you currently taking oral steroids? NO    Do you have a ? Yes    Are you pregnant or breastfeeding?  Not Applicable    Have you received the COVID-19 vaccine? Yes    If yes, was it your 1st, 2nd or only dose needed? 3rd    Date of most recent vaccine: 01/10/22    Notify provider and RNs if systolic BP >170, diastolic BP >100, P >100 or O2 sats < 90%      Hermila Ward CMA ([facility])

## 2022-11-01 NOTE — PATIENT INSTRUCTIONS
[facility]   Medial Branch Block Discharge Instructions      Your procedure was performed by: Dr. Madhav Wiley       Medications used include:  Lidocaine  Bupivicaine      You will need to complete the Pain Scale Log form and return it to us as soon as possible.  Once we have received the form, we will review it and call you to determine the next steps.     The form can be faxed to [phone] or mailed to:   [facility]   [address]   [place] [address]    You may resume your regular medications  If you were holding your blood thinning medication, please restart taking it: N/A  You may resume your regular activities  Be cautious with walking as numbness and/or weakness in the lower extremities may occur for up to 6-8 hours due to the effects of the anesthetic.  Avoid driving for 6 hours. The local anesthetic could slow your reflexes.   You may shower, however no swimming or tub baths or hot tubs for 24 hours following your procedure.  Your pain will return after the numbing medications have worn off.  You may use your current pain medications as needed.  Unless you have been directed to avoid the use of anti-inflammatory medications (NSAIDS), you may use medications such as ibuprofen, Aleve or Tylenol for pain control if needed.  Some people find it helpful to alternate ibuprofen and Tylenol every 3 hours for a couple of days.  You may use ice packs 10-15 minutes three to four times a day at the injection site for comfort.   Do not use heat to painful areas for 6 to 8 hours. This will give the local anesthetic time to wear off and prevent you from accidentally burning your skin.   If you experience any of the following, call the Pain Clinic during work hours (Monday through Friday 8 am-4:30 pm) at [phone] or the Provider Line after hours at [phone]:  -Fever over 100 degree F  -Swelling, bleeding, redness, drainage, warmth at the  injection site  -Progressive weakness or numbness in your legs   -If lumbar, call if you have a loss of bowel or bladder function  -Unusual new onset of pain that is not improving

## 2022-11-01 NOTE — NURSING NOTE
Discharge Information    IV Discontiued Time:  NA    Amount of Fluid Infused:  NA    Discharge Criteria = When patient returns to baseline or as per MD order    Consciousness:  Pt is fully awake    Circulation:  BP +/- 20% of pre-procedure level    Respiration:  Patient is able to breathe deeply    O2 Sat:  Patient is able to maintain O2 Sat >92% on room air    Activity:  Moves 4 extremities on command    Ambulation:  Patient is able to stand and walk or stand and pivot into wheelchair    Dressing:  Clean/dry or No Dressing    Notes:   Discharge instructions and AVS given to patient    Patient meets criteria for discharge?  YES    Admitted to PCU?  No    Responsible adult present to accompany patient home?  Yes    Signature/Title:    Jeramy Luna RN  RN Care Coordinator  [facility] [place]

## 2022-11-03 ENCOUNTER — HOSPITAL ENCOUNTER (OUTPATIENT)
Dept: CT IMAGING | Facility: HOSPITAL | Age: 87
Discharge: HOME | End: 2022-11-03
Attending: PSYCHIATRY & NEUROLOGY | Admitting: PSYCHIATRY & NEUROLOGY
Payer: COMMERCIAL

## 2022-11-03 DIAGNOSIS — R42 DIZZINESS: ICD-10-CM

## 2022-11-03 LAB
CREAT BLD-MCNC: 1.2 MG/DL (ref 0.6–1.1)
GFR SERPL CREATININE-BSD FRML MDRD: 44 ML/MIN/1.73M2

## 2022-11-03 PROCEDURE — 82565 ASSAY OF CREATININE: CPT

## 2022-11-03 PROCEDURE — 70496 CT ANGIOGRAPHY HEAD: CPT

## 2022-11-03 PROCEDURE — 70498 CT ANGIOGRAPHY NECK: CPT

## 2022-11-03 PROCEDURE — 255N000002 HC RX 255 OP 636: Performed by: PSYCHIATRY & NEUROLOGY

## 2022-11-03 RX ADMIN — IOHEXOL 75 ML: 350 INJECTION, SOLUTION INTRAVENOUS at 19:25

## 2022-11-23 ENCOUNTER — THERAPY VISIT (OUTPATIENT)
Dept: OCCUPATIONAL THERAPY | Facility: CLINIC | Age: 87
End: 2022-11-23
Attending: PSYCHIATRY & NEUROLOGY
Payer: COMMERCIAL

## 2022-11-23 DIAGNOSIS — R41.3 MEMORY LOSS: ICD-10-CM

## 2022-11-23 DIAGNOSIS — R42 DIZZINESS: ICD-10-CM

## 2022-11-23 PROCEDURE — 97165 OT EVAL LOW COMPLEX 30 MIN: CPT | Mod: GO

## 2022-11-23 PROCEDURE — 97535 SELF CARE MNGMENT TRAINING: CPT | Mod: GO

## 2022-11-23 ASSESSMENT — ACTIVITIES OF DAILY LIVING (ADL)
IADL_QUICK_ADDS: MEAL PLANNING/PREPARATION;HOME/FINANCIAL/MANAGEMENT;COMMUNICATION/COMPUTER USE;COMMUNITY MOBILITY;CARE OF OTHERS

## 2022-11-23 NOTE — PROGRESS NOTES
11/23/22 1300   Quick Adds   Quick Adds Certification   Type of Visit Initial Outpatient Occupational Therapy Evaluation   General Information   Start Of Care Date 11/23/22   Referring Physician Clifton Erickson, DO   Orders Evaluate and treat as indicated   Orders Date 10/15/22   Medical Diagnosis Memory loss (R41.3)  - Primary   Onset of Illness/Injury or Date of Surgery 10/15/22  (order date)   Precautions/Limitations No known precautions/limitations   Special Instructions MoCA; CPT   Surgical/Medical History Reviewed Yes   Additional Occupational Profile Info/Pertinent History of Current Problem Pt is an 88 year old female who was referred to outpatient occupational therapy due to concerns with memory. Pt drove herself to appointment and presented to clinic independently. Past medical history significant for of hypothyroidism, GERD, COPD, cataracts, chronic Atrial fibrillation on anticoagulation, saccular abdominal aortic aneurysm (growth in 6 months stated 8/25/2022 with cardiology), lower back pain from DDD (followed with pain clinic),and a recent COVID19 infection (4/2022).  She was seen by Dr. Erickson 10/19/22 with orders for outpatient occupational therapy evaluation requesting MoCa administration. Per records, pt was seen with her PCP 7/6/2022 reporting 2-3 months of dizziness and sinus issues. She was feeling of balance, and it was impairing her ability to walk.  She reported a strange onset of symptoms occurring when eating, as this would cause head congestion and this congestion would then improve with tylenol and gabapentin.  CT head was without findings. Per visit 10/19/22, she reports feeling off balance,  may have periods of falling against a wall when walking to the bathroom.  It is hard to catch herself.  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 Short-term memory issues were reported noted during visit. Pt lives independently in a mobile home in  [place] and daughter lives in [place]. She reports she remains independent in ADL/IADL's and continues to drive without concerns. Therapist contacted daughter (Janis) this date for history confirmation and pt's daughter reports no safety concers in ADL/IADL's, no concerns with living independently, and and no significant cognitive/memory concerns at this time.   Role/Living Environment   Current Community Support Family/friend caregiver   Patient role/Employment history Homemaker   Number of Stairs to Enter Home 4   Number of Stairs Within Home 0   Primary Bathroom Location/Comments first floor   Prior Level - Transfers Independent   Prior Level - Ambulation Independent   Prior Level - ADLS Independent   Prior Responsibilities - IADL Meal Preparation;Housekeeping;Laundry;Shopping;Yardwork;Medication management;Finances;Driving   Prior Level Comments Pt was completely independent in ADL/IADL's, ambulating independently, and driving   Role/Living Environment Comments Pt takes prides her homemaking and remaining independent in daily activities.  She enjoys walking, exercising, performing houseold tasks, and driving to her daughter's house in [place] to provide assistance with household tasks. She expreses resistance to ever transitioning to senior living facility and remains prideful in remaining independent   Patient/family Goals Statement maintain her independence   Pain   Patient currently in pain No   Fall Risk Screen   Fall screen completed by OT   Have you fallen 2 or more times in the past year? No   Fall screen comments Pt denies any falls, however, per records, pt has had history of dizziness may have periods of falling against a wall when walking to the bathroom.  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   Cognitive Status Examination   Orientation Orientation to person, place and time   Level of Consciousness Alert   Follows Commands and Answers Questions 100% of the  "time   Personal Safety and Judgment Intact   Cognitive Comment Pt describes occassion difficulties with word finding and requries extra time for recall and thought process. She states she feels like she's \"slowly getting dementia\". Pt denies cognitive errors such as taking medications, misplacing items, and recalls appointments using calendar. Daughter denies cognitive concerns outside age-related changes. The [place] Cognitive Assessment (MoCA) is a brief screen of cognitive abilities used to detect cognitive impairments in the domains of visuospatial/executive functioning, naming, memory, attention, language, abstraction, and orientation. A score of 26 or above is considered normal with a total possible score of 30.  18-26 = mild cognitive impairment, 10-17= moderate cognitive impairment and less than 10= severe cognitive impairment. Results reveal 14/30, suggesting a moderate cognitive impairment in the area of  executive functioning (- 1 trail making, -1 copy cube), naming (-1), attention (-1 letter tap, -3 subtraction), languate (-1 letters begin with F), abstraction,  delayed recall (1/5), and orientation (-1 date). Scores may not reflect functional status due to unfamiliarity of requested tasks. Recommened formal cogntive testing including Cognitive Perforamnce Test to address more complex cognitive functioning and functional status   Visual Perception   Visual Perception Comments Pt wears glasses   Hand Strength   Hand Dominance Right   Hand Strength Comments WFL   Coordination   Coordination Comments appears WFL   Bathing   Level of [place] - Bathing independent   Upper Body Dressing   Level of [place]: Dress Upper Body independent   Lower Body Dressing   Level of [place]: Dress Lower Body independent   Toileting   Level of [place]: Toilet independent   Grooming   Level of [place]: Grooming independent   Eating/Self-Feeding   Level of [place]: Eating independent   Instrumental " Activities of Daily Living Assessment   IADL Assessment/Observations IADL's: Pt reports she continues to drive without concerns, manages medications indepedently. She enjoys driving to her daughters. She enjoys going on walks. Pt states daughter has monitor in home for safety   IADL Quick Adds Meal Planning/Preparation;Home/Financial/Management;Communication/Computer Use;Community Mobility;Care of Others   Meal Planning/Preparation Pt states she performs her own grocery shopping and meal preparation without concerns. She enjoys cooking   Home/Financial Management Pt manages her household tasks including cleaning, laundry, yardwork. She manages her own medications   Communication/Computer Use Pt able to manage own landline and cell phone   Community Mobility Pt continues to drive including to grocery store ([employer]), her daughter's house in [facility], post office   Care of Others Pt enjoys going to her daughters to provide assistance with household tasks   Planned Therapy Interventions   Planned Therapy Interventions ADL training;IADL training;Cognitive performance testing;Self care/Home management;Therapeutic activities   Intervention Comments Pt presents with identifiable cognitive impairments as, which impacts safety and independence in daily activities. Results  on MoCA reveal a score of 14/30, indicating moderate cognitive impairment Pt benefits from skilled outpatient occupational therapy for futher cogntive assessment including The Cognitive Performance Test to identify appropriate supports/environmental modifications, education on compensatory strategies, cognitive aides as needed, strategies for safety and maximizing independence.   Adult OT Eval Goals   OT Eval Goals (Adult) 1    OT Goal 1   Goal Identifier CPT   Goal Description Patient will particiate in Cognitive Performance Test and pt and family to verbalize understanding of results and recommendations for appropriate living support,  recommendations to guide levels of support for activities  task set up, and increase patient's safety   Target Date 02/23/23   Clinical Impression   Criteria for Skilled Therapeutic Interventions Met Yes, treatment indicated   OT Diagnosis impaired safety and independence in ADL/IADL's   Influenced by the following impairments cognitive concerns   Assessment of Occupational Performance 1-3 Performance Deficits   Identified Performance Deficits safety in ADL/IADL's   Clinical Decision Making (Complexity) Low complexity   Therapy Frequency 1-2 visits pending CPT administration and interpretation   Predicted Duration of Therapy Intervention (days/wks) up to 12 weeks pending attendance   Risks and Benefits of Treatment have been explained. Yes   Patient, Family & other staff in agreement with plan of care Yes  (Daughter was called to discuss results and determine interest in follow up CPT appointment)   Clinical Impression Comments Pt presents with identifiable cognitive impairments as, which impacts safety and independence in daily activities. Results  on MoCA reveal a score of 14/30, indicating moderate cognitive impairment. Scores may not reflect pt's functional status due to unfamiliarity of requested tasks and may benefit from formal cognitive testing follow up to predict the ability to perform daily activities, suggest appropriate levels of assistance and supervision, and guide recommendations  Therapist contacted daughter (Janis) this date for history confirmation and pt's daughter reports no safety concers in ADL/IADL's, no concerns with living independently, and and no significant cognitive/memory concerns at this time. Results of cognitive screen and benefits and purpose of CPT were discussed and daughter does not feel like CPT is necessary at this time. Pt agreeable to follow up with CPT should Dr. Erickson indicate further cognitive testing. Pt's daughter was appreciative   Education Assessment    Barriers To Learning Cognitive   Preferred Learning Style Listening;Reading;Demonstration   Therapy Certification   Certification date from 11/23/22   Certification date to 02/23/23   Certification I certify the need for these services furnished under this plan of treatment and while under my care.  (Physician co-signature of this document indicates review and certification of the therapy plan)   Total Evaluation Time   OT Eval, Low Complexity Minutes (58040) 40

## 2022-11-23 NOTE — PROGRESS NOTES
[facility]          OUTPATIENT OCCUPATIONAL THERAPY  EVALUATION  PLAN OF TREATMENT FOR OUTPATIENT REHABILITATION  (COMPLETE FOR INITIAL CLAIMS ONLY)  Patient's Last Name, First Name, M.I.  YOB: 1934  Rosemarie Fry                        Provider's Name  [facility] Medical Record No.  [id number]                               Onset Date:     10/15/22 (order date)   Start of Care Date:     11/23/22   Type:     ___PT   _X_OT   ___SLP Medical Diagnosis:     Memory loss (R41.3)  - Primary                          OT Diagnosis:     impaired safety and independence in ADL/IADL's Visits from SOC:  1   _________________________________________________________________________________  Plan of Treatment/Functional Goals:  ADL training, IADL training, Cognitive performance testing, Self care/Home management, Therapeutic activities     Pt presents with identifiable cognitive impairments as, which impacts safety and independence in daily activities. Results  on MoCA reveal a score of 14/30, indicating moderate cognitive impairment Pt benefits from skilled outpatient occupational therapy for futher cogntive assessment including The Cognitive Performance Test to identify appropriate supports/environmental modifications, education on compensatory strategies, cognitive aides as needed, strategies for safety and maximizing independence.              Goals  Goal Identifier: CPT  Goal Description: Patient will particiate in Cognitive Performance Test and pt and family to verbalize understanding of results and recommendations for appropriate living support, recommendations to guide levels of support for activities  task set up, and increase patient's safety  Target Date: 02/23/23                                                                                              Therapy  Frequency: 1-2 visits pending CPT administration and interpretation     Predicted Duration of Therapy Intervention (days/wks): up to 12 weeks pending attendance  Ro Jordan, OTR          I CERTIFY THE NEED FOR THESE SERVICES FURNISHED UNDER        THIS PLAN OF TREATMENT AND WHILE UNDER MY CARE     (Physician co-signature of this document indicates review and certification of the therapy plan).               ,    Certification date from: 11/23/22, Certification date to: 02/23/23               Referring Physician: Clifton Erickson, DO     Initial Assessment        See Epic Evaluation      Start Of Care Date: 11/23/22

## 2022-11-23 NOTE — PROGRESS NOTES
[facility]          OUTPATIENT OCCUPATIONAL THERAPY  EVALUATION  PLAN OF TREATMENT FOR OUTPATIENT REHABILITATION  (COMPLETE FOR INITIAL CLAIMS ONLY)  Patient's Last Name, First Name, M.I.  YOB: 1934  Rosemarie Fry                        Provider's Name  [facility] Medical Record No.  [id number]                               Onset Date:     10/15/22 (order date)   Start of Care Date:     11/23/22   Type:     ___PT   _X_OT   ___SLP Medical Diagnosis:     Memory loss (R41.3)  - Primary                          OT Diagnosis:     impaired safety and independence in ADL/IADL's Visits from SOC:  1   _________________________________________________________________________________  Plan of Treatment/Functional Goals:  ADL training, IADL training, Cognitive performance testing, Self care/Home management, Therapeutic activities     Pt presents with identifiable cognitive impairments as, which impacts safety and independence in daily activities. Results  on MoCA reveal a score of 14/30, indicating moderate cognitive impairment Pt benefits from skilled outpatient occupational therapy for futher cogntive assessment including The Cognitive Performance Test to identify appropriate supports/environmental modifications, education on compensatory strategies, cognitive aides as needed, strategies for safety and maximizing independence.              Goals  Goal Identifier: CPT  Goal Description: Patient will particiate in Cognitive Performance Test and pt and family to verbalize understanding of results and recommendations for appropriate living support, recommendations to guide levels of support for activities  task set up, and increase patient's safety  Target Date: 02/23/23                                                                                              Therapy  Frequency: 1-2 visits pending CPT administration and interpretation     Predicted Duration of Therapy Intervention (days/wks): up to 12 weeks pending attendance  Ro Jordan, OTR          I CERTIFY THE NEED FOR THESE SERVICES FURNISHED UNDER        THIS PLAN OF TREATMENT AND WHILE UNDER MY CARE     (Physician co-signature of this document indicates review and certification of the therapy plan).               ,    Certification date from: 11/23/22, Certification date to: 02/23/23               Referring Physician: Clifton Erickson, DO     Initial Assessment        See Epic Evaluation      Start Of Care Date: 11/23/22

## 2022-11-25 ENCOUNTER — TELEPHONE (OUTPATIENT)
Dept: PALLIATIVE MEDICINE | Facility: CLINIC | Age: 87
End: 2022-11-25

## 2022-11-25 NOTE — TELEPHONE ENCOUNTER
[facility] Call Center    Phone Message    May a detailed message be left on voicemail: yes     Reason for Call: Other: Patient daughter Yesi is requesting a call back to discuss denial letter from [employer] for service due to missing information on form for provider, please call Yesi at [phone] to advise.    Action Taken: Other: [place] [facility]    Travel Screening: Not Applicable

## 2022-11-28 ENCOUNTER — IMMUNIZATION (OUTPATIENT)
Dept: FAMILY MEDICINE | Facility: CLINIC | Age: 87
End: 2022-11-28
Payer: COMMERCIAL

## 2022-11-28 DIAGNOSIS — Z23 ENCOUNTER FOR IMMUNIZATION: Primary | ICD-10-CM

## 2022-11-28 DIAGNOSIS — R41.3 MEMORY LOSS: Primary | ICD-10-CM

## 2022-11-28 PROCEDURE — 99207 PR NO CHARGE NURSE ONLY: CPT

## 2022-11-28 PROCEDURE — 90662 IIV NO PRSV INCREASED AG IM: CPT

## 2022-11-28 PROCEDURE — G0008 ADMIN INFLUENZA VIRUS VAC: HCPCS

## 2022-11-30 ENCOUNTER — THERAPY VISIT (OUTPATIENT)
Dept: PHYSICAL THERAPY | Facility: CLINIC | Age: 87
End: 2022-11-30
Attending: PAIN MEDICINE
Payer: COMMERCIAL

## 2022-11-30 DIAGNOSIS — E06.3 HYPOTHYROIDISM DUE TO HASHIMOTO'S THYROIDITIS: Primary | ICD-10-CM

## 2022-11-30 DIAGNOSIS — M47.816 SPONDYLOSIS OF LUMBAR REGION WITHOUT MYELOPATHY OR RADICULOPATHY: ICD-10-CM

## 2022-11-30 PROCEDURE — 97161 PT EVAL LOW COMPLEX 20 MIN: CPT | Mod: GP | Performed by: PHYSICAL THERAPIST

## 2022-11-30 PROCEDURE — 97112 NEUROMUSCULAR REEDUCATION: CPT | Mod: GP | Performed by: PHYSICAL THERAPIST

## 2022-11-30 PROCEDURE — 97110 THERAPEUTIC EXERCISES: CPT | Mod: GP | Performed by: PHYSICAL THERAPIST

## 2022-11-30 NOTE — PROGRESS NOTES
Physical Therapy Initial Evaluation  Subjective:  The history is provided by the patient. No  was used.   Patient Health History  Rosemarie Fry being seen for Lumbago.     Problem began: 9/19/2022.   Problem occurred: insidious   Pain score: range 1-9/10, pain level fluctuates.  General health as reported by patient is good.  Health conditions: osteoporosis, COPD.   Red flags:  None as reported by patient.  Medical allergies: sulfa drugs.       Current medications:  Pain medication.    Current occupation is Retired.                     Therapist Generated HPI Evaluation  Problem details: Rosemarie Fry is a 87 year old female who first started having problems with pain chronic  Denies inciting event  The pain is constant  The pain is getting worse   The pain is mainly axial   The pain rad across her back   The pt denies numb ting burning  The pain does not radiate to her le  The pain is worse with walking   The pain is worse with prolonged bending  Pain worse with doing her kitchen work   The pt reports benefit when gets on the floor  Benefit with rest  Benefit with a rambo  Of note has issues with balance  Denies any recent falls  Denies any overt weakness  Denies any new incontinence     Pain rating: intensity  Averages 6/10 on a 0-10 scale..         Type of problem:  Lumbar.    This is a chronic condition.  Condition occurred with:  Insidious onset.  Where condition occurred: for unknown reasons.  Patient reports pain:  Lumbar spine left and lumbar spine right.  Pain is described as aching and is constant.  Pain radiates to:  No radiation. Pain is the same all the time.  Since onset symptoms are unchanged.  Associated symptoms:  Fatigue. Symptoms are exacerbated by bending, standing, carrying, certain positions, lifting and walking  and relieved by nothing.      Barriers include:  Lives alone (has dtr who lives in area).                        Objective:  Standing Alignment:   "  Cervical/Thoracic:  Forward head  Shoulder/UE:  Rounded shoulders  Lumbar:  Lordosis decr            Gait:  Pt typically uses a cane but presents today without her cane.       Deviations:  Hip:  Decr dynamic control LAnkle:  Push off decr L    Flexibility/Screens:     Upper Extremity:    Decreased left upper extremity flexibility at:  Pectoralis Minor    Decreased right upper extremity flexibility present at:  Pectoralis Minor  Lower Extremity:  Decreased left lower extremity flexibility:Hamstrings    Decreased right lower extremity flexibility:  Hamstrings               Lumbar/SI Evaluation  ROM:  Arom wnl lumbar: minimal lordosis reversal with flexion.  AROM Lumbar:   Flexion:            75%  Ext:                    25%   Side Bend:        Left:  50%    Right:  50%  Rotation:           Left:  25%    Right:  25%  Side Glide:        Left:     Right:                   Neural Tension/Mobility:    Left side:  Slump positive.   Right side:   Slump positive.                                                           General Evaluation:      Gross Strength:              Lower Extremity:   Significant findings:  Able to perform toe walk and heel walk although with some hesitancy.  SLS 3\" on R and l. Able to perform 50% squat but with poor technique.                                                               ROS    Assessment/Plan:    Patient is a 88 year old female with lumbar complaints.    Patient has the following significant findings with corresponding treatment plan.                Diagnosis 1:  Lumbago  Pain -  self management, education and home program  Decreased ROM/flexibility - manual therapy, therapeutic exercise and home program  Decreased strength - therapeutic exercise, therapeutic activities and home program  Impaired gait - gait training and home program  Decreased function - therapeutic activities and home program  Impaired posture - neuro re-education and home program    Therapy Evaluation Codes: "     Cumulative Therapy Evaluation is: Low complexity.    Previous and current functional limitations:  (See Goal Flow Sheet for this information)    Short term and Long term goals: (See Goal Flow Sheet for this information)     Communication ability:  Patient appears to be able to clearly communicate and understand verbal and written communication and follow directions correctly.  Treatment Explanation - The following has been discussed with the patient:   RX ordered/plan of care  Anticipated outcomes  Possible risks and side effects  This patient would benefit from PT intervention to resume normal activities.   Rehab potential is good.    Frequency:  1 X week, once daily  Duration:  for 4 weeks tapering to 2 X a month over 2 months  Discharge Plan:  Achieve all LTG.  Independent in home treatment program.  Reach maximal therapeutic benefit.    Please refer to the daily flowsheet for treatment today, total treatment time and time spent performing 1:1 timed codes.

## 2022-11-30 NOTE — PROGRESS NOTES
[facility]    OUTPATIENT Physical Therapy ORTHOPEDIC EVALUATION  PLAN OF TREATMENT FOR OUTPATIENT REHABILITATION  (COMPLETE FOR INITIAL CLAIMS ONLY)  Patient's Last Name, First Name, M.I.  YOB: 1934  Rosemarie Fry    Provider s Name:  [facility]   Medical Record No.  [id number]   Start of Care Date:  11/30/22   Onset Date:   09/19/22 (date PT order)   Treatment Diagnosis:  Lumbago Medical Diagnosis:  Spondylosis of lumbar region without myelopathy or radiculopathy       Goals:     11/30/22 0500   Body Part   Goals listed below are for lumbar   Goal #1   Goal #1 standing   Previous Functional Level No restrictions   Current Functional Level Minutes patient can stand   Performance level 3, pain up to 9/10   STG Target Performance Minutes patient will be able to stand   Performance level 5, pain consistently 5/10   Rationale for housekeeping tasks such as vacuuming, bed making, mowing, gardening;for personal hygiene;for meal preparation;for safe household ambulation   Due date 12/21/22   LTG Target Performance Minutes patient will be able to stand   Performance Level 20, pain consistently 2/10   Rationale for safe community ambulation;for safe household ambulation;for meal preparation;for personal hygiene;for housekeeping tasks such as vacuuming, bed making, mowing   Due date 02/27/23   Goal #2   Goal #2 sitting   Previous Functional Level No restrictions   Current Functional Level Minutes patient can sit   Performance level 15, pain up to 9/10   STG Target Performance Minutes patient will be able to sit   Performance level 20, pain consistently 5/10   Rationale for personal hygiene;to allow rest from standing;for community transportation   Due date 12/21/22   LTG Target Performance Minutes patient will be able to sit   Performance Level 45, pain 2/10   Rationale  for community transportation;to allow rest from standing;for personal hygiene   Due date 02/27/23       Therapy Frequency:  1x/wk x 4 wks, 2x/month x 2 months  Predicted Duration of Therapy Intervention:  3 months    Yousif Mcgrath, PT                 I CERTIFY THE NEED FOR THESE SERVICES FURNISHED UNDER        THIS PLAN OF TREATMENT AND WHILE UNDER MY CARE     (Physician attestation of this document indicates review and certification of the therapy plan).                     Certification Date From:  11/30/22   Certification Date To:  02/27/23    Referring Provider:  Madhav Wiley    Initial Assessment        See Epic Evaluation SOC Date: 11/30/22

## 2022-12-08 ENCOUNTER — THERAPY VISIT (OUTPATIENT)
Dept: PHYSICAL THERAPY | Facility: CLINIC | Age: 87
End: 2022-12-08
Attending: PAIN MEDICINE
Payer: COMMERCIAL

## 2022-12-08 ENCOUNTER — ALLIED HEALTH/NURSE VISIT (OUTPATIENT)
Dept: FAMILY MEDICINE | Facility: CLINIC | Age: 87
End: 2022-12-08

## 2022-12-08 VITALS — DIASTOLIC BLOOD PRESSURE: 60 MMHG | SYSTOLIC BLOOD PRESSURE: 100 MMHG

## 2022-12-08 DIAGNOSIS — M47.816 SPONDYLOSIS OF LUMBAR REGION WITHOUT MYELOPATHY OR RADICULOPATHY: Primary | ICD-10-CM

## 2022-12-08 DIAGNOSIS — Z01.30 BP CHECK: Primary | ICD-10-CM

## 2022-12-08 PROCEDURE — 99207 PR NO CHARGE NURSE ONLY: CPT | Performed by: NURSE PRACTITIONER

## 2022-12-08 PROCEDURE — 97530 THERAPEUTIC ACTIVITIES: CPT | Mod: GP | Performed by: PHYSICAL THERAPIST

## 2022-12-08 PROCEDURE — 97112 NEUROMUSCULAR REEDUCATION: CPT | Mod: GP | Performed by: PHYSICAL THERAPIST

## 2022-12-08 PROCEDURE — 97110 THERAPEUTIC EXERCISES: CPT | Mod: GP | Performed by: PHYSICAL THERAPIST

## 2022-12-23 ENCOUNTER — THERAPY VISIT (OUTPATIENT)
Dept: PHYSICAL THERAPY | Facility: CLINIC | Age: 87
End: 2022-12-23
Payer: COMMERCIAL

## 2022-12-23 DIAGNOSIS — M47.816 SPONDYLOSIS OF LUMBAR REGION WITHOUT MYELOPATHY OR RADICULOPATHY: Primary | ICD-10-CM

## 2022-12-23 PROCEDURE — 97110 THERAPEUTIC EXERCISES: CPT | Mod: GP | Performed by: PHYSICAL THERAPIST

## 2022-12-23 PROCEDURE — 97112 NEUROMUSCULAR REEDUCATION: CPT | Mod: GP | Performed by: PHYSICAL THERAPIST

## 2023-01-12 ENCOUNTER — ANCILLARY ORDERS (OUTPATIENT)
Dept: PALLIATIVE MEDICINE | Facility: CLINIC | Age: 88
End: 2023-01-12

## 2023-01-12 ENCOUNTER — THERAPY VISIT (OUTPATIENT)
Dept: PHYSICAL THERAPY | Facility: CLINIC | Age: 88
End: 2023-01-12
Payer: COMMERCIAL

## 2023-01-12 DIAGNOSIS — M47.816 SPONDYLOSIS OF LUMBAR REGION WITHOUT MYELOPATHY OR RADICULOPATHY: ICD-10-CM

## 2023-01-12 DIAGNOSIS — M47.816 SPONDYLOSIS OF LUMBAR REGION WITHOUT MYELOPATHY OR RADICULOPATHY: Primary | ICD-10-CM

## 2023-01-12 PROCEDURE — 97112 NEUROMUSCULAR REEDUCATION: CPT | Mod: GP | Performed by: PHYSICAL THERAPIST

## 2023-01-12 PROCEDURE — 97110 THERAPEUTIC EXERCISES: CPT | Mod: GP | Performed by: PHYSICAL THERAPIST

## 2023-01-19 ENCOUNTER — RADIOLOGY INJECTION OFFICE VISIT (OUTPATIENT)
Dept: PALLIATIVE MEDICINE | Facility: CLINIC | Age: 88
End: 2023-01-19
Payer: COMMERCIAL

## 2023-01-19 VITALS
DIASTOLIC BLOOD PRESSURE: 98 MMHG | SYSTOLIC BLOOD PRESSURE: 165 MMHG | OXYGEN SATURATION: 97 % | RESPIRATION RATE: 16 BRPM | HEART RATE: 78 BPM

## 2023-01-19 DIAGNOSIS — M47.816 SPONDYLOSIS OF LUMBAR REGION WITHOUT MYELOPATHY OR RADICULOPATHY: ICD-10-CM

## 2023-01-19 PROCEDURE — 64494 INJ PARAVERT F JNT L/S 2 LEV: CPT | Mod: 50 | Performed by: PAIN MEDICINE

## 2023-01-19 PROCEDURE — 64493 INJ PARAVERT F JNT L/S 1 LEV: CPT | Mod: 50 | Performed by: PAIN MEDICINE

## 2023-01-19 ASSESSMENT — PAIN SCALES - GENERAL
PAINLEVEL: NO PAIN (0)
PAINLEVEL: MODERATE PAIN (4)

## 2023-01-19 NOTE — NURSING NOTE
Discharge Information    IV Discontiued Time:  NA    Amount of Fluid Infused:  NA    Discharge Criteria = When patient returns to baseline or as per MD order    Consciousness:  Pt is fully awake    Circulation:  BP +/- 20% of pre-procedure level    Respiration:  Patient is able to breathe deeply    O2 Sat:  Patient is able to maintain O2 Sat >92% on room air    Activity:  Moves 4 extremities on command    Ambulation:  Patient is able to stand and walk or stand and pivot into wheelchair    Dressing:  Clean/dry or No Dressing    Notes:   Discharge instructions and AVS given to patient    Patient meets criteria for discharge?  YES    Admitted to PCU?  No    Responsible adult present to accompany patient home?  Yes    Signature/Title:    Jeramy Luna RN  RN Care Coordinator  [facility]

## 2023-01-19 NOTE — NURSING NOTE
Pre-procedure Intake  If YES to any questions or NO to having a   Please complete laminated checklist and leave on the computer keyboard for Provider, verbally inform provider if able.    For SCS Trial, RFA's or any sedation procedure:  Have you been fasting? No     If yes, for how long?     Are you taking any any blood thinners such as Coumadin, Warfarin, Jantoven, Pradaxa Xarelto, Eliquis, Edoxaban, Enoxaparin, Lovenox, Heparin, Arixtra, Fondaparinux, or Fragmin? OR Antiplatelet medication such as Plavix, Brilinta, or Effient?   Yes, Xarelto     If yes, when did you take your last dose? 1/19/23    Do you take aspirin?  No    If cervical procedure, have you held aspirin for 6 days?      Do you have any allergies to contrast dye, iodine, steroid and/or numbing medications?  NO    Are you currently taking antibiotics or have an active infection?  NO    Have you had a fever/elevated temperature within the past week? NO    Are you currently taking oral steroids? NO    Do you have a ? Yes    Are you pregnant or breastfeeding?  NO    Have you received the COVID-19 vaccine? Yes    If yes, was it your 1st, 2nd or only dose needed?     Date of most recent vaccine: 1/10/22    Notify provider and RNs if systolic BP >170, diastolic BP >100, P >100 or O2 sats < 90%

## 2023-01-19 NOTE — PATIENT INSTRUCTIONS
[facility]   Medial Branch Block Discharge Instructions      Your procedure was performed by: Dr. Madhav Wiley       Medications used include:  Lidocaine  Bupivicaine     You will need to complete the Pain Scale Log form and return it to us as soon as possible.  Once we have received the form, we will review it and call you to determine the next steps.     The form can be faxed to [phone] or mailed to:   [facility]   [id number]   [place] [address]    You may resume your regular medications  If you were holding your blood thinning medication, please restart taking it: N/A  You may resume your regular activities  Be cautious with walking as numbness and/or weakness in the lower extremities may occur for up to 6-8 hours due to the effects of the anesthetic.  Avoid driving for 6 hours. The local anesthetic could slow your reflexes.   You may shower, however no swimming or tub baths or hot tubs for 24 hours following your procedure.  Your pain will return after the numbing medications have worn off.  You may use your current pain medications as needed.  Unless you have been directed to avoid the use of anti-inflammatory medications (NSAIDS), you may use medications such as ibuprofen, Aleve or Tylenol for pain control if needed.  Some people find it helpful to alternate ibuprofen and Tylenol every 3 hours for a couple of days.  You may use ice packs 10-15 minutes three to four times a day at the injection site for comfort.   Do not use heat to painful areas for 6 to 8 hours. This will give the local anesthetic time to wear off and prevent you from accidentally burning your skin.   If you experience any of the following, call the Pain Clinic during work hours (Monday through Friday 8 am-4:30 pm) at [phone] or the Provider Line after hours at [phone]:  -Fever over 100 degree F  -Swelling, bleeding, redness, drainage, warmth at the  injection site  -Progressive weakness or numbness in your legs   -If lumbar, call if you have a loss of bowel or bladder function  -Unusual new onset of pain that is not improving

## 2023-01-20 ENCOUNTER — VIRTUAL VISIT (OUTPATIENT)
Dept: FAMILY MEDICINE | Facility: CLINIC | Age: 88
End: 2023-01-20
Payer: COMMERCIAL

## 2023-01-20 DIAGNOSIS — H60.391 OTHER INFECTIVE ACUTE OTITIS EXTERNA OF RIGHT EAR: Primary | ICD-10-CM

## 2023-01-20 PROCEDURE — 99213 OFFICE O/P EST LOW 20 MIN: CPT | Mod: 95 | Performed by: PHYSICIAN ASSISTANT

## 2023-01-20 RX ORDER — NEOMYCIN SULFATE, POLYMYXIN B SULFATE AND HYDROCORTISONE 10; 3.5; 1 MG/ML; MG/ML; [USP'U]/ML
3 SUSPENSION/ DROPS AURICULAR (OTIC) 3 TIMES DAILY
Qty: 10 ML | Refills: 0 | Status: SHIPPED | OUTPATIENT
Start: 2023-01-20 | End: 2023-01-27

## 2023-01-20 NOTE — PROGRESS NOTES
"Rosemarie is a 88 year old who is being evaluated via a billable telephone visit.      What phone number would you like to be contacted at? [phone]  How would you like to obtain your AVS? [place]    Distant Location (provider location):  Off-site        Subjective   Rosemarie is a 88 year old presenting for the following health issues:  No chief complaint on file.      HPI     Right Ear problem  - itching and pain with in her canal. Dry skin.   - 3-4 weeks  - upper tooth ache - possibly related, patient states \"it needs to be pulled\" - appt on 1/23/23. Her tooth actually is feeling better. She still plans to see her dentist.     Ear at times feels plugged. A little better now     Review of Systems   Constitutional, HEENT, cardiovascular, pulmonary, GI, , musculoskeletal, neuro, skin, endocrine and psych systems are negative, except as otherwise noted.      Objective             Physical Exam   healthy, alert and no distress  PSYCH: Alert and oriented times 3; coherent speech, normal   rate and volume, able to articulate logical thoughts, able   to abstract reason, no tangential thoughts, no hallucinations   or delusions  Her affect is normal  RESP: No cough, no audible wheezing, able to talk in full sentences  Remainder of exam unable to be completed due to telephone visits    Rosemarie was seen today for ear problem.    Diagnoses and all orders for this visit:    Other infective acute otitis externa of right ear  -     neomycin-polymyxin-hydrocortisone (CORTISPORIN) 3.5-73533-5 otic suspension; Place 3 drops into the right ear 3 times daily for 7 days    Other orders  -     REVIEW OF HEALTH MAINTENANCE PROTOCOL ORDERS      Advised supportive and symptomatic treatment.  Follow up with Provider - if condition persists or worsens.           Phone call duration: 9 minutes    "

## 2023-01-20 NOTE — PROGRESS NOTES
1/19/23  Pre procedure Diagnosis:lumbosacral spondylosis   Post procedure Diagnosis: Same  Procedure performed: Bilateral L4-S1 medial branch block  Indication:  Diagnostic   Anesthesia: none  Complications:   Operators: Madhav Wiley MD   Indications:   Rosemarie Fry is a 87 year old female. The patient has a history of axial lbp.  Exam shows +++ and pain with extension/rotation, and they have tried conservative treatment including PHYSICAL THERAPY  and meds.    Options/alternatives, benefits and risks were discussed with the patient including but not limited to bleeding, infection, tissue trauma, exposure to radiation, reaction to medications, spinal cord injury, weakness, numbness and paralysis.  Questions were answered to her satisfaction and she agrees to proceed. Voluntary informed consent was obtained and signed.     Vitals were reviewed: Yes  Allergies were reviewed:  Yes   Medications were reviewed:  Yes   Pre-procedure pain score: 4/10    Procedure:  After obtaining signed informed consent, the patient was brought into the procedure suite and was placed in a prone position on the procedure table.   A Pause for the Cause was performed.  The patient was prepped and draped in the usual sterile fashion.     Under AP fluoroscopic guidance the L3, L4, L5 vertebral bodies were identified. The C-arm was rotated to the oblique view to afford optimal visualization the pedicles.  Lidocaine 1% was used to anesthetize the skin at each level.  Under intermittent fluoroscopy, 25G 3.5inch spinal needles were positioned inferior and lateral to the intersection of the transverse process and pedicle at the Bilateral L4 & L5 levels . The needle positions were verified and optimized from the AP view.    The anatomic targets for the L3 & L4 medial nerve and L5 dorsal ramus (which functionally incorporates the medial branch) were the  L4 & L5 transverse processes and sacral alar notch, with laterality as described above,  resulting in blockade of the L4/5 and L5/S1 facet joints.    Bupivacaine 0.25% 1 ml was injected at each location.  The needles were removed.      Hemostasis was achieved, the area was cleaned, and bandaids were placed when appropriate.  The patient tolerated the procedure well, and was taken to the recovery room.    Images were saved to PACS.     The patient will continue to monitor progress, and they were given a pain diary to complete at home.  They will either fax or mail this back to us or bring it to their next appointment. We will determine the treatment plan after we review the diary.      Post-procedure pain score: 0/10  Follow-up includes:   -f/u phone call in one week  -will await diary for further planning.    Madhav Wiley MD  [facility]

## 2023-01-25 ENCOUNTER — MEDICAL CORRESPONDENCE (OUTPATIENT)
Dept: HEALTH INFORMATION MANAGEMENT | Facility: CLINIC | Age: 88
End: 2023-01-25
Payer: COMMERCIAL

## 2023-02-06 ENCOUNTER — MYC MEDICAL ADVICE (OUTPATIENT)
Dept: FAMILY MEDICINE | Facility: CLINIC | Age: 88
End: 2023-02-06
Payer: COMMERCIAL

## 2023-02-06 DIAGNOSIS — H92.09 OTALGIA, UNSPECIFIED LATERALITY: Primary | ICD-10-CM

## 2023-02-06 NOTE — TELEPHONE ENCOUNTER
Daughter called back, the dentist is having troubles getting a ENT referral to the [facility]. Daughter is wanting to know if [facility] Conk[facility] would place this order so expedite the process.     Please see Souchet message.     Thank you,   Negar Bates RN

## 2023-02-06 NOTE — TELEPHONE ENCOUNTER
RN left message to follow up with myChart message  Vanessa Trammell RN on 2/6/2023 at 10:45 AM

## 2023-02-13 ENCOUNTER — HOSPITAL ENCOUNTER (OUTPATIENT)
Dept: CT IMAGING | Facility: HOSPITAL | Age: 88
Discharge: HOME | End: 2023-02-13
Attending: INTERNAL MEDICINE | Admitting: INTERNAL MEDICINE
Payer: COMMERCIAL

## 2023-02-13 ENCOUNTER — MYC MEDICAL ADVICE (OUTPATIENT)
Dept: CARDIOLOGY | Facility: CLINIC | Age: 88
End: 2023-02-13

## 2023-02-13 ENCOUNTER — ALLIED HEALTH/NURSE VISIT (OUTPATIENT)
Dept: FAMILY MEDICINE | Facility: CLINIC | Age: 88
End: 2023-02-13
Payer: COMMERCIAL

## 2023-02-13 VITALS — SYSTOLIC BLOOD PRESSURE: 110 MMHG | DIASTOLIC BLOOD PRESSURE: 75 MMHG

## 2023-02-13 DIAGNOSIS — Z01.30 BP CHECK: Primary | ICD-10-CM

## 2023-02-13 DIAGNOSIS — I71.40 ABDOMINAL AORTIC ANEURYSM (AAA) WITHOUT RUPTURE (H): ICD-10-CM

## 2023-02-13 LAB
CREAT BLD-MCNC: 1.2 MG/DL (ref 0.6–1.1)
GFR SERPL CREATININE-BSD FRML MDRD: 43 ML/MIN/1.73M2

## 2023-02-13 PROCEDURE — 74174 CTA ABD&PLVS W/CONTRAST: CPT

## 2023-02-13 PROCEDURE — 82565 ASSAY OF CREATININE: CPT

## 2023-02-13 PROCEDURE — 250N000011 HC RX IP 250 OP 636: Performed by: INTERNAL MEDICINE

## 2023-02-13 PROCEDURE — 99207 PR NO CHARGE NURSE ONLY: CPT | Performed by: NURSE PRACTITIONER

## 2023-02-13 RX ORDER — IOPAMIDOL 755 MG/ML
75 INJECTION, SOLUTION INTRAVASCULAR ONCE
Status: COMPLETED | OUTPATIENT
Start: 2023-02-13 | End: 2023-02-13

## 2023-02-13 RX ADMIN — IOPAMIDOL 75 ML: 755 INJECTION, SOLUTION INTRAVENOUS at 14:04

## 2023-02-13 NOTE — PROGRESS NOTES
Rosemarie Fry was evaluated at [facility] on February 13, 2023 at which time her blood pressure was:    BP Readings from Last 3 Encounters:   02/13/23 110/75   01/19/23 (!) 165/98   12/08/22 100/60     Pulse Readings from Last 3 Encounters:   01/19/23 78   11/01/22 (!) 131   10/19/22 70       Reviewed lifestyle modifications for blood pressure control and reduction: including making healthy food choices, managing weight, getting regular exercise, smoking cessation, reducing alcohol consumption, monitoring blood pressure regularly.     Symptoms: None    BP Goal:< 140/90 mmHg    BP Assessment:  BP at goal    Potential Reasons for BP too high: NA - Not applicable    BP Follow-Up Plan: Recheck BP in 6 months at pharmacy    Recommendation to Provider: Patient complains of dizziness and blood pressure is always low when she comes into the pharmacy, so I would recommend at this time just monitoring and not adjusting or adding any medications.    Note completed by: Jennifer Castillo, Angeline

## 2023-02-14 ENCOUNTER — MYC MEDICAL ADVICE (OUTPATIENT)
Dept: VASCULAR SURGERY | Facility: CLINIC | Age: 88
End: 2023-02-14
Payer: COMMERCIAL

## 2023-02-14 DIAGNOSIS — I71.20 ANEURYSM OF THORACIC AORTA (H): Primary | ICD-10-CM

## 2023-02-20 NOTE — TELEPHONE ENCOUNTER
From: Martha Plascencia MD  Sent: 2/20/2023  11:51 AM CST  To: Erich Calzada RN  Subject: FW: CT scan                                      Rosemarie Sepulveda's daughter sent this message. Being that things are stable we can continue to monitor for now.  If they would feel better seeing vascular surgery to go over the results I am okay with that as well.  If they want to see vascular surgery can you put in a consult request please?     Thanks, EG    ----- Message -----  From: Cassie Pinon RN  Sent: 2/14/2023   9:30 AM CST  To: Martha Plascencia MD  Subject: FW: CT scan                                      This message was intended for you.  Patient is not currently established with vascular.  Please place referral if it is needed and respond to patient.      Thank you!  ----- Message -----  From: Rosemarie Fry  Sent: 2/14/2023   9:23 AM CST  To: [facility] Support Pool  Subject: CT scan                                          Martha, do you think it would be okay if we saw Dr. Dia Hartman?  Or just get Vascular's opinion.  I understand most of the report but I do need some guidance.  It seems that it's stable. But does she have on aneurysm in her brain also, small one.  Tell me what you think.     Thanks Janis

## 2023-02-20 NOTE — TELEPHONE ENCOUNTER
Spoke with daughter, Janis regarding recommendations from Dr. Plascencia. Daughter would like a referral placed. Completed request. Follow up with Lizzeth Plascencia 04/23. n further questions or concerns noted.-OSMAR

## 2023-02-24 ENCOUNTER — DOCUMENTATION ONLY (OUTPATIENT)
Dept: VASCULAR SURGERY | Facility: CLINIC | Age: 88
End: 2023-02-24
Payer: COMMERCIAL

## 2023-02-24 NOTE — PROGRESS NOTES
Vascular Referral Intake    Referred by: Dr. Plascencia for aneurysm of thoracic aorta    Specialty: Vascular Surgery    Specific Provider if Necessary: direct referral to MD Dia Hartman. Ok if virtual. Ok if sees Dr. Tian too.     Visit Type: Vascular Medicine    Time Frame: No Preference    Testing/Imaging Needed Before Consult: not needed. CTA abd/pelvis on 2/13/23    Appt Note: (to be pasted into appt comments, also add where additional information is located, ie, outside images pushed to PACS, in Epic, [facility], etc)  Consult from Dr. Plascencia. CTA shows saccular distal aortic aneurysm, underlying bilobed mid-distal aortic ectasia, maximally 2.9 cm just below the level of the renal arteries, associated with a tiny atherosclerosis ulceration and maximally 2.5 x 2.7 cm just above the level of the NAKIA, mild celiac stenosis, moderate right renal artery stenosis and mild left renal artery stenosis. On xarelto.

## 2023-03-03 DIAGNOSIS — E06.3 HYPOTHYROIDISM DUE TO HASHIMOTO'S THYROIDITIS: ICD-10-CM

## 2023-03-07 RX ORDER — LEVOTHYROXINE SODIUM 50 UG/1
TABLET ORAL
Qty: 30 TABLET | Refills: 0 | Status: SHIPPED | OUTPATIENT
Start: 2023-03-07 | End: 2023-04-20

## 2023-03-27 ENCOUNTER — OFFICE VISIT (OUTPATIENT)
Dept: VASCULAR SURGERY | Facility: CLINIC | Age: 88
End: 2023-03-27
Attending: INTERNAL MEDICINE
Payer: COMMERCIAL

## 2023-03-27 VITALS — HEART RATE: 66 BPM | DIASTOLIC BLOOD PRESSURE: 64 MMHG | SYSTOLIC BLOOD PRESSURE: 136 MMHG

## 2023-03-27 DIAGNOSIS — I71.40 ABDOMINAL AORTIC ANEURYSM (AAA) 3.0 CM TO 5.0 CM IN DIAMETER IN FEMALE (H): ICD-10-CM

## 2023-03-27 PROCEDURE — 99203 OFFICE O/P NEW LOW 30 MIN: CPT | Performed by: SURGERY

## 2023-03-27 PROCEDURE — G0463 HOSPITAL OUTPT CLINIC VISIT: HCPCS | Performed by: SURGERY

## 2023-03-27 RX ORDER — MECLIZINE HCL 12.5 MG 12.5 MG/1
TABLET ORAL
COMMUNITY
Start: 2023-03-02 | End: 2023-11-24

## 2023-03-27 ASSESSMENT — PAIN SCALES - GENERAL: PAINLEVEL: NO PAIN (0)

## 2023-03-27 NOTE — NURSING NOTE
[facility]        Patient is here for a consult to discuss Abdominal aortic aneurysm (AAA).    Pt is currently taking Xarelto.    /64   Pulse 66     The provider has been notified that the patient has no concerns.     Questions patient would like addressed today are: N/A.    Refills are needed: No    Has homecare services and agency name:  Reshma Nogueira LPN

## 2023-03-27 NOTE — PATIENT INSTRUCTIONS
Matt Houston,    Thank you for entrusting your care with us today. After your visit today with MD Dia Hartman this is the plan that was discussed at your appointment.    We will contact you in 3-6 months to schedule 1 year follow-up with Yue CANO, with CT scan      I am including additional information on these things and our contact information if you have any questions or concerns.   Please do not hesitate to reach out to us if you felt we did not answer your questions or you are unsure of the treatment plan after your visit today. Our number is [phone].Thank you for trusting us with your care.         Again thank you for your time.

## 2023-03-27 NOTE — PROGRESS NOTES
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  Stable for more than a year now.  Not symptomatic.    RECOMMENDATION: Patient with 2.6 cm saccular outpouching of the aorta resulting in an aortic aneurysm of less than 4 cm in maximum diameter.  At her age and given its stability no role for intervention.  We will see her in [facility] clinic in a years time with a noncontrast CT Abdo pelvis.    HPI:  Rosemarie Fry is a 88 year old female who was seen today for a focal saccular aneurysm of her infrarenal aorta discovered on surveillance imaging by Dr. Plascencia and her team.  Not symptomatic at all.  First noted on a scan in January 2022.    Patient's main complaints are about burning discomfort in her forehead and dizziness particularly immediately after shower.  Has been seen by neurology and they sent her for CT of the head which demonstrated a 3 mm saccular aneurysm of the MCA only.  They were not able to help with her symptoms of dizziness or burning pain.  The patient's symptoms are made better with Tylenol as well as after antibiotics which she was given for sinusitis.  Symptoms have recurred.  Strangely she says that the symptoms get better if she takes a half dose of antibiotics.  This improvement can last 3 to 4 days.  Patient does have an appointment with ENT in the next couple of weeks and hopefully this will resolve her major concern.    Patient recently has low running blood pressures although today she is 134 systolic.  No role for aggressive  antihypertensive therapy particularly in the setting of recurrent dizziness and possible hypotension    No other new issues    PHH:    Past Medical History:   Diagnosis Date     Allergic rhinitis due to animal dander      Atrial fibrillation (H)     Xarelto     Cataract, mild-mod, ou 5/17/2015     Diagnostic skin and sensitization tests (aka ALLERGENS)     Skin test 5/5/10 pos. for:  cat/dog/T/RW     Ex-smoker      Lyme disease 1999     Giancarlo     Osteoporosis      Seasonal allergic rhinitis skin test 5/5/10 pos. for:  cat/dog/T/RW        Past Surgical History:   Procedure Laterality Date     HYSTERECTOMY       LAPAROSCOPIC ASSISTED HYSTERECTOMY VAGINAL       ZZC APPENDECTOMY         ALLERGIES:  Sulfa drugs    MEDS:    Current Outpatient Medications:      acetaminophen (TYLENOL) 325 MG tablet, Take 325 mg by mouth every 6 hours as needed for mild pain, Disp: , Rfl:      calcium-vitamin D-vitamin K (VIACTIV) 500-500-40 MG-UNT-MCG CHEW, Take 1 tablet by mouth daily, Disp: , Rfl:      gabapentin (NEURONTIN) 100 MG capsule, Take 2 caps in AM and 1 cap during the afternoon/evening if needed, Disp: 90 capsule, Rfl: 1     levothyroxine (SYNTHROID/LEVOTHROID) 50 MCG tablet, TAKE 1 TABLET(50 MCG) BY MOUTH DAILY, Disp: 30 tablet, Rfl: 0     meclizine (ANTIVERT) 12.5 MG tablet, , Disp: , Rfl:      metoprolol succinate ER (TOPROL XL) 50 MG 24 hr tablet, Take 1 tablet (50 mg) by mouth daily Note dose change of tabs. Only take 1 tab, Disp: 90 tablet, Rfl: 0     nitroGLYcerin (NITROSTAT) 0.4 MG sublingual tablet, Place 1 tablet (0.4 mg) under the tongue every 5 minutes as needed for chest pain If you are still having symptoms after 3 doses (15 minutes) call 911., Disp: 25 tablet, Rfl: 0     rivaroxaban ANTICOAGULANT (XARELTO) 20 MG TABS tablet, Take 20 mg by mouth every morning, Disp: , Rfl:      vitamin B-2 (RIBOFLAVIN) 25 MG TABS tablet, Take 1 tablet by mouth daily, Disp: , Rfl:   No current facility-administered medications  for this visit.    Facility-Administered Medications Ordered in Other Visits:      iohexol (OMNIPAQUE) 300 mg/mL injection 10 mL, 10 mL, EPIDURAL, Once, Mireya Quispe MD    SOCIAL HABITS:    History   Smoking Status     Former     Packs/day: 1.00     Years: 10.00     Types: Cigarettes     Quit date: 4/12/2007   Smokeless Tobacco     Never     Social History    Substance and Sexual Activity      Alcohol use: No        Alcohol/week: 0.0 standard drinks      History   Drug Use No       FAMILY HISTORY:    Family History   Problem Relation Age of Onset     Diabetes Brother      Hypertension Brother      Asthma Daughter      Cancer No family hx of      Cerebrovascular Disease No family hx of      Thyroid Disease No family hx of      Glaucoma No family hx of      Macular Degeneration No family hx of        REVIEW OF SYSTEMS:    A 12 point ROS was reviewed and except for what is listed in the HPI above, all others are negative    PE:  /64   Pulse 66   Wt Readings from Last 1 Encounters:   10/19/22 168 lb 4.8 oz (76.3 kg)     There is no height or weight on file to calculate BMI.    EXAM:  GENERAL: This is a well-developed 88 year old female who appears her stated age  EYES: Grossly normal.  MOUTH: Buccal mucosa normal   MUSCULOSKELETAL: Grossly normal and both lower extremities are intact.  HEME/LYMPH: No lymphedema  NEUROLOGIC: Focally intact, Alert and oriented x 3.   PSYCH: appropriate affect  INTEGUMENT: No open lesions or ulcers        DIAGNOSTIC STUDIES:     Images:  No results found.    I personally reviewed the images and my interpretation is that her CT demonstrates a saccular outpouching of her infrarenal abdominal aorta of about 2.6 cm leaving a less than 4 cm aortic aneurysm.    LABS:      Sodium   Date Value Ref Range Status   07/06/2022 139 133 - 144 mmol/L Final   04/23/2022 140 136 - 145 mmol/L Final   06/29/2020 141 133 - 144 mmol/L Final   11/26/2018 141 133 - 144 mmol/L Final   05/21/2018  140 133 - 144 mmol/L Final     Urea Nitrogen   Date Value Ref Range Status   07/06/2022 39 (H) 7 - 30 mg/dL Final   04/23/2022 23 8 - 28 mg/dL Final   06/29/2020 24 7 - 30 mg/dL Final   11/26/2018 26 7 - 30 mg/dL Final   05/21/2018 30 7 - 30 mg/dL Final     Hemoglobin   Date Value Ref Range Status   07/06/2022 12.3 11.7 - 15.7 g/dL Final   04/23/2022 12.9 11.7 - 15.7 g/dL Final   06/29/2020 12.3 11.7 - 15.7 g/dL Final   05/01/2019 12.7 11.7 - 15.7 g/dL Final   11/26/2018 12.5 11.7 - 15.7 g/dL Final     Platelet Count   Date Value Ref Range Status   07/06/2022 249 150 - 450 10e3/uL Final   04/23/2022 226 150 - 450 10e3/uL Final   06/29/2020 231 150 - 450 10e9/L Final   05/01/2019 249 150 - 450 10e9/L Final   05/21/2018 242 150 - 450 10e9/L Final     BNP   Date Value Ref Range Status   04/23/2022 818 (H) 0 - 167 pg/mL Final         Dia Hartman MD, MD  VASCULAR SURGERY

## 2023-04-22 ENCOUNTER — HEALTH MAINTENANCE LETTER (OUTPATIENT)
Age: 88
End: 2023-04-22

## 2023-04-24 ENCOUNTER — OFFICE VISIT (OUTPATIENT)
Dept: CARDIOLOGY | Facility: CLINIC | Age: 88
End: 2023-04-24
Payer: COMMERCIAL

## 2023-04-24 VITALS
DIASTOLIC BLOOD PRESSURE: 88 MMHG | HEART RATE: 65 BPM | BODY MASS INDEX: 30.73 KG/M2 | WEIGHT: 168 LBS | SYSTOLIC BLOOD PRESSURE: 128 MMHG | RESPIRATION RATE: 16 BRPM

## 2023-04-24 DIAGNOSIS — E78.2 MIXED HYPERLIPIDEMIA: Primary | ICD-10-CM

## 2023-04-24 LAB
CHOLEST SERPL-MCNC: 182 MG/DL
HDLC SERPL-MCNC: 38 MG/DL
LDLC SERPL CALC-MCNC: 85 MG/DL
NONHDLC SERPL-MCNC: 144 MG/DL
TRIGL SERPL-MCNC: 295 MG/DL

## 2023-04-24 PROCEDURE — 80061 LIPID PANEL: CPT | Performed by: INTERNAL MEDICINE

## 2023-04-24 PROCEDURE — 99214 OFFICE O/P EST MOD 30 MIN: CPT | Performed by: INTERNAL MEDICINE

## 2023-04-24 PROCEDURE — 36415 COLL VENOUS BLD VENIPUNCTURE: CPT | Performed by: INTERNAL MEDICINE

## 2023-04-24 NOTE — LETTER
"4/24/2023    Yessy Juarez, TONY CNP  [address] [facility]  [place] [address]    RE: Rosemarie Fry       Dear Colleague,     I had the pleasure of seeing Rosemarie Fry in the [facility].    HEART CARE ENCOUNTER CONSULTATON NOTE      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  Repeat scan in 1 year, refer to vascular surgery if growing.    Paroxysmal atrial fibrillation - on xarelto and metoprolol    Risk stratification - lipid check today    F/U 1 year       History of Present Illness/Subjective    HPI: Rosemarie Fry is a 88 year old female from [place] with atrial fibrillation on xarelto and metoprolol, hypothyroidism, bronchitis, sinusitis, venous insufficiency and small airway disease. MRI spine reported an incidental aneurysm: \"There appears to be a focal posterior-laterally directed outpouching of the right aspect of the infrarenal abdominal aorta concerning for abdominal aortic aneurysm. At this level, the aorta appears to measure over 3 cm in diameter. This was also present at least to some degree on the prior examination.\" A subsequent abdominal US was normal.  The daughter also notes that previous echos have Reported a dilated ascending aorta of 3.9-4.0 cm reported as \"mild-moderately\" enlarged. Most recent CTA 2/2023 was stable.     Mrs. Fry returns for follow up today. She has been active, shoveling snow and taking care of her home without trouble. She will take breaks to rest but denies any chest pain, dyspnea, dizziness, or syncope. She has dizziness around 11 am if she does not eat in the AM.        Physical Examination  Review of Systems   Vitals: /88 (BP Location: Right arm, Patient Position: Sitting, Cuff Size: Adult Regular)   Pulse 65   Resp 16   Wt 76.2 kg (168 lb)   BMI 30.73 kg/m    BMI= Body mass index is 30.73 " kg/m .  Wt Readings from Last 3 Encounters:   04/24/23 76.2 kg (168 lb)   10/19/22 76.3 kg (168 lb 4.8 oz)   08/25/22 76.7 kg (169 lb)       General Appearance:   no distress, normal body habitus   ENT/Mouth: membranes moist, no oral lesions or bleeding gums.      EYES:  no scleral icterus, normal conjunctivae   Neck: no carotid bruits or thyromegaly   Chest/Lungs:   lungs are clear to auscultation, no rales or wheezing, no sternal scar, equal chest wall expansion    Cardiovascular:   Regular. Normal first and second heart sounds with faint murmur. No rubs or gallops; the right carotid, radial and posterior tibial pulses are intact and the left carotid, radial and posterior tibial pulses are intact.  Jugular venous pressure is flat, no edema bilaterally    Abdomen:  no organomegaly, masses, bruits, or tenderness; bowel sounds are present   Extremities: no cyanosis or clubbing   Skin: no xanthelasma, warm.    Neurologic: normal  bilateral, no tremors     Psychiatric: alert and oriented x3, calm        Please refer above for cardiac ROS details.        Medical History  Surgical History Family History Social History   Past Medical History:   Diagnosis Date    Allergic rhinitis due to animal dander     Atrial fibrillation (H)     Xarelto    Cataract, mild-mod, ou 5/17/2015    Diagnostic skin and sensitization tests (aka ALLERGENS)     Skin test 5/5/10 pos. for:  cat/dog/T/RW    Ex-smoker     Lyme disease 1999     [place]    Osteoporosis     Seasonal allergic rhinitis skin test 5/5/10 pos. for:  cat/dog/T/RW     Past Surgical History:   Procedure Laterality Date    HYSTERECTOMY      LAPAROSCOPIC ASSISTED HYSTERECTOMY VAGINAL      ZZC APPENDECTOMY       Family History   Problem Relation Age of Onset    Diabetes Brother     Hypertension Brother     Asthma Daughter     Cancer No family hx of     Cerebrovascular Disease No family hx of     Thyroid Disease No family hx of     Glaucoma No family hx of     Macular  Degeneration No family hx of         Social History     Socioeconomic History    Marital status:      Spouse name: Not on file    Number of children: 5    Years of education: 8    Highest education level: Not on file   Occupational History    Occupation: [employer]     Employer: [employer]     Comment: 15 years   Tobacco Use    Smoking status: Former     Packs/day: 1.00     Years: 10.00     Pack years: 10.00     Types: Cigarettes     Quit date: 2007     Years since quittin.0    Smokeless tobacco: Never   Vaping Use    Vaping status: Never Used   Substance and Sexual Activity    Alcohol use: No     Alcohol/week: 0.0 standard drinks of alcohol    Drug use: No    Sexual activity: Never     Birth control/protection: Surgical     Comment: PARTIAL HYST   Other Topics Concern    Parent/sibling w/ CABG, MI or angioplasty before 65F 55M? Not Asked   Social History Narrative    Not on file     Social Determinants of Health     Financial Resource Strain: Not on file   Food Insecurity: Not on file   Transportation Needs: Not on file   Physical Activity: Not on file   Stress: Not on file   Social Connections: Not on file   Intimate Partner Violence: Not on file   Housing Stability: Not on file           Medications  Allergies   Current Outpatient Medications   Medication Sig Dispense Refill    acetaminophen (TYLENOL) 325 MG tablet Take 325 mg by mouth every 6 hours as needed for mild pain      levothyroxine (SYNTHROID/LEVOTHROID) 50 MCG tablet TAKE 1 TABLET(50 MCG) BY MOUTH DAILY 30 tablet 0    meclizine (ANTIVERT) 12.5 MG tablet       metoprolol succinate ER (TOPROL XL) 50 MG 24 hr tablet Take 1 tablet (50 mg) by mouth daily Note dose change of tabs. Only take 1 tab 90 tablet 0    nitroGLYcerin (NITROSTAT) 0.4 MG sublingual tablet Place 1 tablet (0.4 mg) under the tongue every 5 minutes as needed for chest pain If you are still having symptoms after 3 doses (15 minutes) call 911. 25 tablet 0    rivaroxaban  ANTICOAGULANT (XARELTO) 20 MG TABS tablet Take 20 mg by mouth every morning      calcium-vitamin D-vitamin K (VIACTIV) 500-500-40 MG-UNT-MCG CHEW Take 1 tablet by mouth daily (Patient not taking: Reported on 4/24/2023)      gabapentin (NEURONTIN) 100 MG capsule Take 2 caps in AM and 1 cap during the afternoon/evening if needed (Patient not taking: Reported on 4/24/2023) 90 capsule 1    vitamin B-2 (RIBOFLAVIN) 25 MG TABS tablet Take 1 tablet by mouth daily (Patient not taking: Reported on 4/24/2023)         Allergies   Allergen Reactions    Sulfa Drugs Rash          Lab Results    Chemistry/lipid CBC Cardiac Enzymes/BNP/TSH/INR   Recent Labs   Lab Test 12/11/18  1318   CHOL 220*   HDL 44*   *   TRIG 218*     Recent Labs   Lab Test 12/11/18  1318 02/17/16  1621   * 141*     Recent Labs   Lab Test 02/13/23  1352 11/03/22  1902 07/06/22  1658   NA  --   --  139   POTASSIUM  --   --  5.2   CHLORIDE  --   --  108   CO2  --   --  27   GLC  --   --  97   BUN  --   --  39*   CR 1.2*   < > 1.14*   GFRESTIMATED 43*   < > 46*   ANGELITO  --   --  9.6    < > = values in this interval not displayed.     Recent Labs   Lab Test 02/13/23  1352 11/03/22  1902 07/06/22  1658   CR 1.2* 1.2* 1.14*     No results for input(s): A1C in the last 83822 hours.       Recent Labs   Lab Test 07/06/22  1658   WBC 6.3   HGB 12.3   HCT 37.6   *        Recent Labs   Lab Test 07/06/22  1658 04/23/22  1123 06/29/20  1140   HGB 12.3 12.9 12.3    Recent Labs   Lab Test 04/23/22  1123   TROPONINI <0.01     Recent Labs   Lab Test 04/23/22  1123 05/01/19  1450 05/21/18  1437   *  --   --    NTBNP  --  86 105     Recent Labs   Lab Test 06/29/20  1140   TSH 5.56*     No results for input(s): INR in the last 79355 hours.     Today's clinic visit entailed:  Review of prior external note(s) from - CareEverywhere information from epic reviewed  40 minutes spent by me on the date of the encounter doing chart review, review of  outside records, review of test results, interpretation of tests, patient visit and documentation   Provider  Link to MDM Help Grid     The level of medical decision making during this visit was of moderate complexity.        Martha Plascencia MD              Thank you for allowing me to participate in the care of your patient.      Sincerely,     Martha Plascencia MD     [facility]  cc:   Martha Plascencia MD  [address] [facility] [address]  [place] [address]

## 2023-04-24 NOTE — PROGRESS NOTES
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  Repeat scan in 1 year, refer to vascular surgery if growing.    Paroxysmal atrial fibrillation - on xarelto and metoprolol    Risk stratification - lipid check today    F/U 1 year       History of Present Illness/Subjective    HPI: Rosemarie Fry is a 88 year old female from [place] with atrial fibrillation on xarelto and metoprolol, hypothyroidism, bronchitis, sinusitis, venous insufficiency and small airway disease. MRI spine reported an incidental aneurysm: \"There appears to be a focal posterior-laterally directed outpouching of the right aspect of the infrarenal abdominal aorta concerning for abdominal aortic aneurysm. At this level, the aorta appears to measure over 3 cm in diameter. This was also present at least to some degree on the prior examination.\" A subsequent abdominal US was normal.  The daughter also notes that previous echos have Reported a dilated ascending aorta of 3.9-4.0 cm reported as \"mild-moderately\" enlarged. Most recent CTA 2/2023 was stable.     Mrs. Fry returns for follow up today. She has been active, shoveling snow and taking care of her home without trouble. She will take breaks to rest but denies any chest pain, dyspnea, dizziness, or syncope. She has dizziness around 11 am if she does not eat in the AM.        Physical Examination  Review of Systems   Vitals: /88 (BP Location: Right arm, Patient Position: Sitting, Cuff Size: Adult Regular)   Pulse 65   Resp 16   Wt 76.2 kg (168 lb)   BMI 30.73 kg/m    BMI= Body mass index is 30.73 kg/m .  Wt Readings from Last 3 Encounters:   04/24/23 76.2 kg (168 lb)   10/19/22 76.3 kg (168 lb 4.8 oz)   08/25/22 76.7 kg (169 lb)       General Appearance:   no distress, normal body habitus   ENT/Mouth: membranes " moist, no oral lesions or bleeding gums.      EYES:  no scleral icterus, normal conjunctivae   Neck: no carotid bruits or thyromegaly   Chest/Lungs:   lungs are clear to auscultation, no rales or wheezing, no sternal scar, equal chest wall expansion    Cardiovascular:   Regular. Normal first and second heart sounds with faint murmur. No rubs or gallops; the right carotid, radial and posterior tibial pulses are intact and the left carotid, radial and posterior tibial pulses are intact.  Jugular venous pressure is flat, no edema bilaterally    Abdomen:  no organomegaly, masses, bruits, or tenderness; bowel sounds are present   Extremities: no cyanosis or clubbing   Skin: no xanthelasma, warm.    Neurologic: normal  bilateral, no tremors     Psychiatric: alert and oriented x3, calm        Please refer above for cardiac ROS details.        Medical History  Surgical History Family History Social History   Past Medical History:   Diagnosis Date     Allergic rhinitis due to animal dander      Atrial fibrillation (H)     Xarelto     Cataract, mild-mod, ou 5/17/2015     Diagnostic skin and sensitization tests (aka ALLERGENS)     Skin test 5/5/10 pos. for:  cat/dog/T/RW     Ex-smoker      Lyme disease 1999     [place]     Osteoporosis      Seasonal allergic rhinitis skin test 5/5/10 pos. for:  cat/dog/T/RW     Past Surgical History:   Procedure Laterality Date     HYSTERECTOMY       LAPAROSCOPIC ASSISTED HYSTERECTOMY VAGINAL       ZZC APPENDECTOMY       Family History   Problem Relation Age of Onset     Diabetes Brother      Hypertension Brother      Asthma Daughter      Cancer No family hx of      Cerebrovascular Disease No family hx of      Thyroid Disease No family hx of      Glaucoma No family hx of      Macular Degeneration No family hx of         Social History     Socioeconomic History     Marital status:      Spouse name: Not on file     Number of children: 5     Years of education: 8     Highest education  level: Not on file   Occupational History     Occupation: [employer]     Employer: [employer]     Comment: 15 years   Tobacco Use     Smoking status: Former     Packs/day: 1.00     Years: 10.00     Pack years: 10.00     Types: Cigarettes     Quit date: 2007     Years since quittin.0     Smokeless tobacco: Never   Vaping Use     Vaping status: Never Used   Substance and Sexual Activity     Alcohol use: No     Alcohol/week: 0.0 standard drinks of alcohol     Drug use: No     Sexual activity: Never     Birth control/protection: Surgical     Comment: PARTIAL HYST   Other Topics Concern     Parent/sibling w/ CABG, MI or angioplasty before 65F 55M? Not Asked   Social History Narrative     Not on file     Social Determinants of Health     Financial Resource Strain: Not on file   Food Insecurity: Not on file   Transportation Needs: Not on file   Physical Activity: Not on file   Stress: Not on file   Social Connections: Not on file   Intimate Partner Violence: Not on file   Housing Stability: Not on file           Medications  Allergies   Current Outpatient Medications   Medication Sig Dispense Refill     acetaminophen (TYLENOL) 325 MG tablet Take 325 mg by mouth every 6 hours as needed for mild pain       levothyroxine (SYNTHROID/LEVOTHROID) 50 MCG tablet TAKE 1 TABLET(50 MCG) BY MOUTH DAILY 30 tablet 0     meclizine (ANTIVERT) 12.5 MG tablet        metoprolol succinate ER (TOPROL XL) 50 MG 24 hr tablet Take 1 tablet (50 mg) by mouth daily Note dose change of tabs. Only take 1 tab 90 tablet 0     nitroGLYcerin (NITROSTAT) 0.4 MG sublingual tablet Place 1 tablet (0.4 mg) under the tongue every 5 minutes as needed for chest pain If you are still having symptoms after 3 doses (15 minutes) call 911. 25 tablet 0     rivaroxaban ANTICOAGULANT (XARELTO) 20 MG TABS tablet Take 20 mg by mouth every morning       calcium-vitamin D-vitamin K (VIACTIV) 500-500-40 MG-UNT-MCG CHEW Take 1 tablet by mouth daily (Patient not taking:  Reported on 4/24/2023)       gabapentin (NEURONTIN) 100 MG capsule Take 2 caps in AM and 1 cap during the afternoon/evening if needed (Patient not taking: Reported on 4/24/2023) 90 capsule 1     vitamin B-2 (RIBOFLAVIN) 25 MG TABS tablet Take 1 tablet by mouth daily (Patient not taking: Reported on 4/24/2023)         Allergies   Allergen Reactions     Sulfa Drugs Rash          Lab Results    Chemistry/lipid CBC Cardiac Enzymes/BNP/TSH/INR   Recent Labs   Lab Test 12/11/18  1318   CHOL 220*   HDL 44*   *   TRIG 218*     Recent Labs   Lab Test 12/11/18  1318 02/17/16  1621   * 141*     Recent Labs   Lab Test 02/13/23  1352 11/03/22  1902 07/06/22  1658   NA  --   --  139   POTASSIUM  --   --  5.2   CHLORIDE  --   --  108   CO2  --   --  27   GLC  --   --  97   BUN  --   --  39*   CR 1.2*   < > 1.14*   GFRESTIMATED 43*   < > 46*   ANGELITO  --   --  9.6    < > = values in this interval not displayed.     Recent Labs   Lab Test 02/13/23  1352 11/03/22  1902 07/06/22  1658   CR 1.2* 1.2* 1.14*     No results for input(s): A1C in the last 47125 hours.       Recent Labs   Lab Test 07/06/22  1658   WBC 6.3   HGB 12.3   HCT 37.6   *        Recent Labs   Lab Test 07/06/22  1658 04/23/22  1123 06/29/20  1140   HGB 12.3 12.9 12.3    Recent Labs   Lab Test 04/23/22  1123   TROPONINI <0.01     Recent Labs   Lab Test 04/23/22  1123 05/01/19  1450 05/21/18  1437   *  --   --    NTBNP  --  86 105     Recent Labs   Lab Test 06/29/20  1140   TSH 5.56*     No results for input(s): INR in the last 87688 hours.     Today's clinic visit entailed:  Review of prior external note(s) from - CareEverywhere information from epic reviewed  40 minutes spent by me on the date of the encounter doing chart review, review of outside records, review of test results, interpretation of tests, patient visit and documentation   Provider  Link to MDM Help Grid     The level of medical decision making during this visit was of  moderate complexity.        Martha Plascencia MD

## 2023-04-30 NOTE — PROGRESS NOTES
Chief Complaint   Patient presents with     Ear Problem     Right ear is itchy and bothersome. Dizzy today. Had sinus infection, came back. Nose is always running, when eating. Spray in nose doesn't help     History of Present Illness   Rosemarie Fry is a 88 year old female who presents today for ear evaluation. I am seeing this patient in consultation for otalgia at the request of the provider Alden Conklin PA-C.  The patient has quite a complex past medical history.  Her primary concern today actually is not her itchy ear but the fact that she has significant sense of dizziness/imbalance with associated nausea on an almost daily basis.  Apparently she had quite extensive work-up including neurology and cardiovascular medicine, both of which have been unrevealing.  There was concern for a possible mass or tumor in her right maxillary sinus.  They were wondering if the ear itching was all related to her dizziness/imbalance.  She was told she had positional vertigo and had 1 treatment with physical therapy but became so nauseous he did not want to have further treatments.  She does not have any joint replacements but does have a history of bilateral cataracts that need to be fixed.  She reports forehead pressure that has improved in the past with oral amoxicillin.  The patient underwent a CT angiogram of the head and neck on 11/3/2022.  My review of the sinus portion of the CT imaging does show a small mucous retention cyst in the floor the right maxillary sinus.  The paranasal sinuses are otherwise clear without any significant evidence of acute or chronic inflammation.    The patient reports intermittent itching and crusting of the right ear canal.  She is using Vaseline on a Q-tip in the past which does not seem to make it better.  She sometimes will have some burning and aching of the ear and pressure in the forehead.  She will take Tylenol which does not seem to help symptoms.  She has used gabapentin in the  past with some benefit.  Patient and family deny headache or migraine history.  She currently denies any otorrhea, bloody otorrhea, or previous ear surgery.  No significant nasal obstruction.  She does have intermittent clear rhinorrhea that seems to be worse with eating.  She has not had previous nose or sinus surgery.    Past Medical History  Patient Active Problem List   Diagnosis     Seasonal allergic rhinitis     Allergic rhinitis due to animal dander     CARDIOVASCULAR SCREENING; LDL GOAL LESS THAN 160     Osteoporosis     GERD (gastroesophageal reflux disease)     Advance Care Planning     COPD (chronic obstructive pulmonary disease) (H)     Bilateral low back pain without sciatica, unspecified chronicity     Shoulder impingement     Varicose veins of legs     Diagnostic skin and sensitization tests (aka ALLERGENS)     Cataract, mild-mod, ou     Posterior vitreous detachment, ou     Glaucoma suspect, ou     Chronic atrial fibrillation (H)     Subclinical hypothyroidism     Mixed simple and mucopurulent chronic bronchitis (H)     Current Medications    Current Outpatient Medications:      fluocinolone acetonide oil 0.01 % ear drops, Place 0.25 mLs (5 drops) into both ears 2 times daily as needed (ear itching), Disp: 20 mL, Rfl: 1     ipratropium (ATROVENT) 0.06 % nasal spray, Spray 2 sprays into both nostrils 4 times daily as needed for rhinitis (runny nose), Disp: 15 mL, Rfl: 3     levothyroxine (SYNTHROID/LEVOTHROID) 50 MCG tablet, TAKE 1 TABLET(50 MCG) BY MOUTH DAILY, Disp: 30 tablet, Rfl: 0     meclizine (ANTIVERT) 12.5 MG tablet, , Disp: , Rfl:      metoprolol succinate ER (TOPROL XL) 50 MG 24 hr tablet, Take 1 tablet (50 mg) by mouth daily Note dose change of tabs. Only take 1 tab, Disp: 90 tablet, Rfl: 0     rivaroxaban ANTICOAGULANT (XARELTO) 20 MG TABS tablet, Take 20 mg by mouth every morning, Disp: , Rfl:      acetaminophen (TYLENOL) 325 MG tablet, Take 325 mg by mouth every 6 hours as needed for  mild pain, Disp: , Rfl:      calcium-vitamin D-vitamin K (VIACTIV) 500-500-40 MG-UNT-MCG CHEW, Take 1 tablet by mouth daily (Patient not taking: Reported on 2023), Disp: , Rfl:      gabapentin (NEURONTIN) 100 MG capsule, Take 2 caps in AM and 1 cap during the afternoon/evening if needed (Patient not taking: Reported on 2023), Disp: 90 capsule, Rfl: 1     nitroGLYcerin (NITROSTAT) 0.4 MG sublingual tablet, Place 1 tablet (0.4 mg) under the tongue every 5 minutes as needed for chest pain If you are still having symptoms after 3 doses (15 minutes) call 911., Disp: 25 tablet, Rfl: 0     vitamin B-2 (RIBOFLAVIN) 25 MG TABS tablet, Take 1 tablet by mouth daily (Patient not taking: Reported on 2023), Disp: , Rfl:   No current facility-administered medications for this visit.    Facility-Administered Medications Ordered in Other Visits:      iohexol (OMNIPAQUE) 300 mg/mL injection 10 mL, 10 mL, EPIDURAL, Once, Mireya Quispe MD    Allergies  Allergies   Allergen Reactions     Sulfa Antibiotics Rash       Social History  Social History     Socioeconomic History     Marital status:      Number of children: 5     Years of education: 8   Occupational History     Occupation: [employer]     Employer: [employer]     Comment: 15 years   Tobacco Use     Smoking status: Former     Packs/day: 1.00     Years: 10.00     Pack years: 10.00     Types: Cigarettes     Quit date: 2007     Years since quittin.0     Smokeless tobacco: Never   Vaping Use     Vaping status: Never Used   Substance and Sexual Activity     Alcohol use: No     Alcohol/week: 0.0 standard drinks of alcohol     Drug use: No     Sexual activity: Never     Birth control/protection: Surgical     Comment: PARTIAL HYST       Family History  Family History   Problem Relation Age of Onset     Diabetes Brother      Hypertension Brother      Asthma Daughter      Cancer No family hx of      Cerebrovascular Disease No family hx of      Thyroid  Disease No family hx of      Glaucoma No family hx of      Macular Degeneration No family hx of        Review of Systems  As per HPI and PMHx, otherwise 7 system review of the head and neck negative.    Physical Exam  /72   Pulse 68   Temp 98.1  F (36.7  C) (Tympanic)   SpO2 96%   GENERAL: Patient is a pleasant, cooperative 88 year old female in no acute distress.  HEAD: Normocephalic, atraumatic.  Hair and scalp are normal.  EYES: Pupils are equal, round, reactive to light and accommodation.  Extraocular movements are intact.  The sclera nonicteric without injection.  The extraocular structures are normal.  EARS: See below.  NOSE: Nares are patent.  Nasal mucosa is pink and moist.  Negative anterior rhinoscopy.  NEUROLOGIC: Cranial nerves II through XII are grossly intact.  Voice is strong.  Patient is House-Brackmann I/VI bilaterally.  CARDIOVASCULAR: Extremities are warm and well-perfused.  No significant peripheral edema.  RESPIRATORY: Patient has nonlabored breathing without cough, wheeze, stridor.  PSYCHIATRIC: Patient is alert and oriented.  Mood and affect appear normal.  SKIN: Warm and dry.  No scalp, face, or neck lesions noted.    Physical Exam and Procedure    EARS: Normal shape and symmetry.  No tenderness when palpating the mastoid or tragal areas bilaterally.  The ears were then examined under the otic binocular microscope to perform cerumen removal.  Otoscopic exam on the right reveals impacted cerumen down to the level of the tympanic membrane.  The cerumen was removed with a #5 suction.  The right tympanic membrane was round, intact without evidence of effusion.  No retraction, granulation, drainage, or evidence of cholesteatoma.      Attention was turned to the left ear.  Otoscopic exam on the left reveals a moderate amount of cerumen I cleaned with a #7 suction.  The left tympanic membrane was round, intact without evidence of effusion.  No retraction, granulation, drainage, or evidence  of cholesteatoma.      Assessment and Plan     ICD-10-CM    1. Dizziness  R42 fluocinolone acetonide oil 0.01 % ear drops     ipratropium (ATROVENT) 0.06 % nasal spray     MR Brain w/o & w Contrast     CANCELED: Remove Cerumen      2. Imbalance  R26.89       3. Ear itching  L29.9 fluocinolone acetonide oil 0.01 % ear drops     ipratropium (ATROVENT) 0.06 % nasal spray     MR Brain w/o & w Contrast     CANCELED: Remove Cerumen      4. Ear fullness, right  H93.8X1       5. Facial pressure  R44.8 fluocinolone acetonide oil 0.01 % ear drops     ipratropium (ATROVENT) 0.06 % nasal spray     MR Brain w/o & w Contrast     CANCELED: Remove Cerumen      6. Vasomotor rhinitis  J30.0 fluocinolone acetonide oil 0.01 % ear drops     ipratropium (ATROVENT) 0.06 % nasal spray     MR Brain w/o & w Contrast     CANCELED: Remove Cerumen      7. Sensorineural hearing loss (SNHL) of both ears  H90.3 Adult Audiology  Referral      8. Impacted cerumen of right ear  H61.21 Microscopy, Binocular        It was my pleasure seeing Rosemarie Fry today in clinic.  The patient presents today with dizziness, imbalance, unsteadiness with associated nausea.  She is yet to have an MRI of the inner ear brainstem.  Based on neurology's last note, they also wanted to image her pituitary.  I will place an order for an MRI.      It is difficult to know if she has not in her ear contribution from her imbalance/unsteadiness.  I do think an audiogram would be helpful, unfortunately 1 was not scheduled.  We will see if we can get an audiogram late this morning.  She did have some cerumen overlying the right tympanic membrane, which was cleaned today in office.  We discussed a trial of Derm otic eardrops as needed for itching.  I sent a prescription to her pharmacy.      I think that the patient's facial pressure sounds to be more migrainous or headache related.  Recent CT imaging of the head and neck did not show any sinus disease.  We can also  check the MRI for any signs of sinus inflammation.  At some point, we could entertain vestibular testing.  I think we should start with MRI imaging and an audiogram.     The patient has symptoms of vasomotor rhinitis with intermittent clear runny nose.  We discussed a trial of Atrovent nasal spray 2 sprays each side up to 4 times daily as needed for symptoms.    The patient should likely establish with an internal medicine physician.  I could see if Dr. Marquez or Dr. Butcher could take on this patient.    ADDENDUM  The patient does have moderate sloping to mild sloping to moderately severe sensorineural hearing loss in both ears on her audiometric testing.  No significant asymmetry is noted.  I think I would move forward with MRI imaging.  I would also consider vestibular evaluation with balance testing.  I might reach out to neurology as well to discuss further work-up and management.    Aravind Hunt MD  Department of Otolaryngology-Head and Neck Surgery  [facility]

## 2023-05-01 ENCOUNTER — OFFICE VISIT (OUTPATIENT)
Dept: AUDIOLOGY | Facility: CLINIC | Age: 88
End: 2023-05-01
Payer: COMMERCIAL

## 2023-05-01 ENCOUNTER — ANCILLARY ORDERS (OUTPATIENT)
Dept: FAMILY MEDICINE | Facility: CLINIC | Age: 88
End: 2023-05-01

## 2023-05-01 ENCOUNTER — ANCILLARY ORDERS (OUTPATIENT)
Dept: MAMMOGRAPHY | Facility: CLINIC | Age: 88
End: 2023-05-01

## 2023-05-01 ENCOUNTER — OFFICE VISIT (OUTPATIENT)
Dept: OTOLARYNGOLOGY | Facility: CLINIC | Age: 88
End: 2023-05-01
Payer: COMMERCIAL

## 2023-05-01 VITALS
DIASTOLIC BLOOD PRESSURE: 72 MMHG | SYSTOLIC BLOOD PRESSURE: 131 MMHG | OXYGEN SATURATION: 96 % | TEMPERATURE: 98.1 F | HEART RATE: 68 BPM

## 2023-05-01 DIAGNOSIS — H90.3 SENSORINEURAL HEARING LOSS (SNHL) OF BOTH EARS: ICD-10-CM

## 2023-05-01 DIAGNOSIS — L29.9 EAR ITCHING: ICD-10-CM

## 2023-05-01 DIAGNOSIS — R26.89 IMBALANCE: ICD-10-CM

## 2023-05-01 DIAGNOSIS — R42 DIZZINESS: Primary | ICD-10-CM

## 2023-05-01 DIAGNOSIS — H93.8X1 EAR FULLNESS, RIGHT: ICD-10-CM

## 2023-05-01 DIAGNOSIS — R44.8 FACIAL PRESSURE: ICD-10-CM

## 2023-05-01 DIAGNOSIS — H61.21 IMPACTED CERUMEN OF RIGHT EAR: ICD-10-CM

## 2023-05-01 DIAGNOSIS — Z12.31 VISIT FOR SCREENING MAMMOGRAM: ICD-10-CM

## 2023-05-01 DIAGNOSIS — J30.0 VASOMOTOR RHINITIS: ICD-10-CM

## 2023-05-01 PROCEDURE — 99204 OFFICE O/P NEW MOD 45 MIN: CPT | Mod: 25 | Performed by: OTOLARYNGOLOGY

## 2023-05-01 PROCEDURE — 92550 TYMPANOMETRY & REFLEX THRESH: CPT | Performed by: AUDIOLOGIST

## 2023-05-01 PROCEDURE — 92557 COMPREHENSIVE HEARING TEST: CPT | Performed by: AUDIOLOGIST

## 2023-05-01 PROCEDURE — 92504 EAR MICROSCOPY EXAMINATION: CPT | Performed by: OTOLARYNGOLOGY

## 2023-05-01 RX ORDER — IPRATROPIUM BROMIDE 42 UG/1
2 SPRAY, METERED NASAL 4 TIMES DAILY PRN
Qty: 15 ML | Refills: 3 | Status: SHIPPED | OUTPATIENT
Start: 2023-05-01 | End: 2024-05-20

## 2023-05-01 RX ORDER — FLUOCINOLONE ACETONIDE 0.11 MG/ML
5 OIL AURICULAR (OTIC) 2 TIMES DAILY PRN
Qty: 20 ML | Refills: 1 | Status: SHIPPED | OUTPATIENT
Start: 2023-05-01

## 2023-05-01 NOTE — LETTER
5/1/2023         RE: Rosemarie Fry  [address]  [place] [address]        Dear Colleague,    Thank you for referring your patient, Rosemarie Fry, to the [facility]. Please see a copy of my visit note below.    Chief Complaint   Patient presents with     Ear Problem     Right ear is itchy and bothersome. Dizzy today. Had sinus infection, came back. Nose is always running, when eating. Spray in nose doesn't help     History of Present Illness   Rosemarie Fry is a 88 year old female who presents today for ear evaluation. I am seeing this patient in consultation for otalgia at the request of the provider Alden Conklin PA-C.  The patient has quite a complex past medical history.  Her primary concern today actually is not her itchy ear but the fact that she has significant sense of dizziness/imbalance with associated nausea on an almost daily basis.  Apparently she had quite extensive work-up including neurology and cardiovascular medicine, both of which have been unrevealing.  There was concern for a possible mass or tumor in her right maxillary sinus.  They were wondering if the ear itching was all related to her dizziness/imbalance.  She was told she had positional vertigo and had 1 treatment with physical therapy but became so nauseous he did not want to have further treatments.  She does not have any joint replacements but does have a history of bilateral cataracts that need to be fixed.  She reports forehead pressure that has improved in the past with oral amoxicillin.  The patient underwent a CT angiogram of the head and neck on 11/3/2022.  My review of the sinus portion of the CT imaging does show a small mucous retention cyst in the floor the right maxillary sinus.  The paranasal sinuses are otherwise clear without any significant evidence of acute or chronic inflammation.    The patient reports intermittent itching and crusting of the right ear canal.  She is using  Vaseline on a Q-tip in the past which does not seem to make it better.  She sometimes will have some burning and aching of the ear and pressure in the forehead.  She will take Tylenol which does not seem to help symptoms.  She has used gabapentin in the past with some benefit.  Patient and family deny headache or migraine history.  She currently denies any otorrhea, bloody otorrhea, or previous ear surgery.  No significant nasal obstruction.  She does have intermittent clear rhinorrhea that seems to be worse with eating.  She has not had previous nose or sinus surgery.    Past Medical History  Patient Active Problem List   Diagnosis     Seasonal allergic rhinitis     Allergic rhinitis due to animal dander     CARDIOVASCULAR SCREENING; LDL GOAL LESS THAN 160     Osteoporosis     GERD (gastroesophageal reflux disease)     Advance Care Planning     COPD (chronic obstructive pulmonary disease) (H)     Bilateral low back pain without sciatica, unspecified chronicity     Shoulder impingement     Varicose veins of legs     Diagnostic skin and sensitization tests (aka ALLERGENS)     Cataract, mild-mod, ou     Posterior vitreous detachment, ou     Glaucoma suspect, ou     Chronic atrial fibrillation (H)     Subclinical hypothyroidism     Mixed simple and mucopurulent chronic bronchitis (H)     Current Medications    Current Outpatient Medications:      fluocinolone acetonide oil 0.01 % ear drops, Place 0.25 mLs (5 drops) into both ears 2 times daily as needed (ear itching), Disp: 20 mL, Rfl: 1     ipratropium (ATROVENT) 0.06 % nasal spray, Spray 2 sprays into both nostrils 4 times daily as needed for rhinitis (runny nose), Disp: 15 mL, Rfl: 3     levothyroxine (SYNTHROID/LEVOTHROID) 50 MCG tablet, TAKE 1 TABLET(50 MCG) BY MOUTH DAILY, Disp: 30 tablet, Rfl: 0     meclizine (ANTIVERT) 12.5 MG tablet, , Disp: , Rfl:      metoprolol succinate ER (TOPROL XL) 50 MG 24 hr tablet, Take 1 tablet (50 mg) by mouth daily Note dose  change of tabs. Only take 1 tab, Disp: 90 tablet, Rfl: 0     rivaroxaban ANTICOAGULANT (XARELTO) 20 MG TABS tablet, Take 20 mg by mouth every morning, Disp: , Rfl:      acetaminophen (TYLENOL) 325 MG tablet, Take 325 mg by mouth every 6 hours as needed for mild pain, Disp: , Rfl:      calcium-vitamin D-vitamin K (VIACTIV) 500-500-40 MG-UNT-MCG CHEW, Take 1 tablet by mouth daily (Patient not taking: Reported on 2023), Disp: , Rfl:      gabapentin (NEURONTIN) 100 MG capsule, Take 2 caps in AM and 1 cap during the afternoon/evening if needed (Patient not taking: Reported on 2023), Disp: 90 capsule, Rfl: 1     nitroGLYcerin (NITROSTAT) 0.4 MG sublingual tablet, Place 1 tablet (0.4 mg) under the tongue every 5 minutes as needed for chest pain If you are still having symptoms after 3 doses (15 minutes) call 911., Disp: 25 tablet, Rfl: 0     vitamin B-2 (RIBOFLAVIN) 25 MG TABS tablet, Take 1 tablet by mouth daily (Patient not taking: Reported on 2023), Disp: , Rfl:   No current facility-administered medications for this visit.    Facility-Administered Medications Ordered in Other Visits:      iohexol (OMNIPAQUE) 300 mg/mL injection 10 mL, 10 mL, EPIDURAL, Once, Mireya Quispe MD    Allergies  Allergies   Allergen Reactions     Sulfa Antibiotics Rash       Social History  Social History     Socioeconomic History     Marital status:      Number of children: 5     Years of education: 8   Occupational History     Occupation: [employer]     Employer: [employer]     Comment: 15 years   Tobacco Use     Smoking status: Former     Packs/day: 1.00     Years: 10.00     Pack years: 10.00     Types: Cigarettes     Quit date: 2007     Years since quittin.0     Smokeless tobacco: Never   Vaping Use     Vaping status: Never Used   Substance and Sexual Activity     Alcohol use: No     Alcohol/week: 0.0 standard drinks of alcohol     Drug use: No     Sexual activity: Never     Birth control/protection:  Surgical     Comment: PARTIAL HYST       Family History  Family History   Problem Relation Age of Onset     Diabetes Brother      Hypertension Brother      Asthma Daughter      Cancer No family hx of      Cerebrovascular Disease No family hx of      Thyroid Disease No family hx of      Glaucoma No family hx of      Macular Degeneration No family hx of        Review of Systems  As per HPI and PMHx, otherwise 7 system review of the head and neck negative.    Physical Exam  /72   Pulse 68   Temp 98.1  F (36.7  C) (Tympanic)   SpO2 96%   GENERAL: Patient is a pleasant, cooperative 88 year old female in no acute distress.  HEAD: Normocephalic, atraumatic.  Hair and scalp are normal.  EYES: Pupils are equal, round, reactive to light and accommodation.  Extraocular movements are intact.  The sclera nonicteric without injection.  The extraocular structures are normal.  EARS: See below.  NOSE: Nares are patent.  Nasal mucosa is pink and moist.  Negative anterior rhinoscopy.  NEUROLOGIC: Cranial nerves II through XII are grossly intact.  Voice is strong.  Patient is House-Brackmann I/VI bilaterally.  CARDIOVASCULAR: Extremities are warm and well-perfused.  No significant peripheral edema.  RESPIRATORY: Patient has nonlabored breathing without cough, wheeze, stridor.  PSYCHIATRIC: Patient is alert and oriented.  Mood and affect appear normal.  SKIN: Warm and dry.  No scalp, face, or neck lesions noted.    Physical Exam and Procedure    EARS: Normal shape and symmetry.  No tenderness when palpating the mastoid or tragal areas bilaterally.  The ears were then examined under the otic binocular microscope to perform cerumen removal.  Otoscopic exam on the right reveals impacted cerumen down to the level of the tympanic membrane.  The cerumen was removed with a #5 suction.  The right tympanic membrane was round, intact without evidence of effusion.  No retraction, granulation, drainage, or evidence of cholesteatoma.       Attention was turned to the left ear.  Otoscopic exam on the left reveals a moderate amount of cerumen I cleaned with a #7 suction.  The left tympanic membrane was round, intact without evidence of effusion.  No retraction, granulation, drainage, or evidence of cholesteatoma.      Assessment and Plan     ICD-10-CM    1. Dizziness  R42 fluocinolone acetonide oil 0.01 % ear drops     ipratropium (ATROVENT) 0.06 % nasal spray     MR Brain w/o & w Contrast     CANCELED: Remove Cerumen      2. Imbalance  R26.89       3. Ear itching  L29.9 fluocinolone acetonide oil 0.01 % ear drops     ipratropium (ATROVENT) 0.06 % nasal spray     MR Brain w/o & w Contrast     CANCELED: Remove Cerumen      4. Ear fullness, right  H93.8X1       5. Facial pressure  R44.8 fluocinolone acetonide oil 0.01 % ear drops     ipratropium (ATROVENT) 0.06 % nasal spray     MR Brain w/o & w Contrast     CANCELED: Remove Cerumen      6. Vasomotor rhinitis  J30.0 fluocinolone acetonide oil 0.01 % ear drops     ipratropium (ATROVENT) 0.06 % nasal spray     MR Brain w/o & w Contrast     CANCELED: Remove Cerumen      7. Sensorineural hearing loss (SNHL) of both ears  H90.3 Adult Audiology  Referral      8. Impacted cerumen of right ear  H61.21 Microscopy, Binocular        It was my pleasure seeing Rosemarie Fry today in clinic.  The patient presents today with dizziness, imbalance, unsteadiness with associated nausea.  She is yet to have an MRI of the inner ear brainstem.  Based on neurology's last note, they also wanted to image her pituitary.  I will place an order for an MRI.      It is difficult to know if she has not in her ear contribution from her imbalance/unsteadiness.  I do think an audiogram would be helpful, unfortunately 1 was not scheduled.  We will see if we can get an audiogram late this morning.  She did have some cerumen overlying the right tympanic membrane, which was cleaned today in office.  We discussed a trial of Derm  otic eardrops as needed for itching.  I sent a prescription to her pharmacy.      I think that the patient's facial pressure sounds to be more migrainous or headache related.  Recent CT imaging of the head and neck did not show any sinus disease.  We can also check the MRI for any signs of sinus inflammation.  At some point, we could entertain vestibular testing.  I think we should start with MRI imaging and an audiogram.     The patient has symptoms of vasomotor rhinitis with intermittent clear runny nose.  We discussed a trial of Atrovent nasal spray 2 sprays each side up to 4 times daily as needed for symptoms.    The patient should likely establish with an internal medicine physician.  I could see if Dr. Marquez or Dr. Butcher could take on this patient.    ADDENDUM  The patient does have moderate sloping to mild sloping to moderately severe sensorineural hearing loss in both ears on her audiometric testing.  No significant asymmetry is noted.  I think I would move forward with MRI imaging.  I would also consider vestibular evaluation with balance testing.  I might reach out to neurology as well to discuss further work-up and management.    Aravind Hunt MD  Department of Otolaryngology-Head and Neck Surgery  [facility]         Again, thank you for allowing me to participate in the care of your patient.        Sincerely,        Aravind Hunt MD

## 2023-05-01 NOTE — PROGRESS NOTES
AUDIOLOGY REPORT:    Patient was referred from ENT by Dr. Hunt for audiology evaluation. The patient reports that she has been having dizziness. She has also been having problems with itching and crusting in her right ear, and moisture in her ears. The patient reports that she has not noted major changes in hearing, though she sometimes has to turn the TV volume up. The patient reports that she does not have tinnitus, history of loud noise exposure, or family history of hearing loss. She has not worn hearing aids. 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 The patient was accompanied to the appointment by her daughter.    Testing:    Otoscopy:   Otoscopic exam indicates ears are clear of cerumen bilaterally     Tympanograms:    RIGHT: normal eardrum mobility     LEFT:   normal eardrum mobility    Reflexes (reported by stimulus ear):  RIGHT: Ipsilateral is absent at frequencies tested  RIGHT: Contralateral is absent at frequencies tested  LEFT:   Ipsilateral is absent at frequencies tested  LEFT:   Contralateral is absent at frequencies tested    Thresholds:   Pure Tone Thresholds assessed using conventional audiometry with fair to good reliability from 250-8000 Hz bilaterally using insert earphones and circumaural headphones     RIGHT:  moderate rising to normal (1000 Hz) sloping to severe essentially sensorineural hearing loss    LEFT:    moderately severe rising to normal (1000 Hz) sloping to severe essentially sensorineural hearing loss with an air-bone gap at 250 Hz  NOTE: the patient stated after testing that she did not always respond to the quietest tones, so some responses may be suprathreshold    Speech Reception Threshold:    RIGHT: 30 dB HL    LEFT:   30 dB HL  Results are in agreement with pure tone average.     Word Recognition  Score:     RIGHT: 56% at 85 dB HL using NU-6 recorded word list.      52% at 90 dB HL using NU-6 recorded word list.    LEFT:   76% at 85 dB HL using NU-6 recorded word list.    Discussed results with the patient.     Patient was returned to ENT for follow up.     Michael Guy, CCC-A  Licensed Audiologist #[id number]  5/1/2023

## 2023-05-02 ENCOUNTER — TELEPHONE (OUTPATIENT)
Dept: FAMILY MEDICINE | Facility: CLINIC | Age: 88
End: 2023-05-02
Payer: COMMERCIAL

## 2023-05-02 NOTE — TELEPHONE ENCOUNTER
Daughter of the patient returns our call and she wants the last appt of the day.  It is explained this is going to be out weeks.  Ok per daughter as mom has had dizziness for over a yr.  Scheduled. Yesi ABBOTT RN

## 2023-05-02 NOTE — TELEPHONE ENCOUNTER
I received an Epic staff message from ENT Dr. Hunt.  Patient is having dizziness along with multiple other neurologic issues.  Dr. Hunt is wondering if patient would want to transition PCP from her current provider to  physician.  If patient would like to transfer PCP, ok to schedule new patient appointment with me in 1-2 weeks.  If she declines, then she should schedule follow-up with her current PCP due to ongoing issues with dizziness.

## 2023-05-02 NOTE — TELEPHONE ENCOUNTER
Contacted the patient who asked that we speak to her daughter Yesi.  Called the Daughter Yesi and needed to leave a message to call us back. Yesi ABBOTT RN

## 2023-05-15 ENCOUNTER — HOSPITAL ENCOUNTER (OUTPATIENT)
Dept: MRI IMAGING | Facility: CLINIC | Age: 88
Discharge: HOME | End: 2023-05-15
Attending: OTOLARYNGOLOGY | Admitting: OTOLARYNGOLOGY
Payer: COMMERCIAL

## 2023-05-15 DIAGNOSIS — R44.8 FACIAL PRESSURE: ICD-10-CM

## 2023-05-15 DIAGNOSIS — L29.9 EAR ITCHING: ICD-10-CM

## 2023-05-15 DIAGNOSIS — J30.0 VASOMOTOR RHINITIS: ICD-10-CM

## 2023-05-15 DIAGNOSIS — R42 DIZZINESS: ICD-10-CM

## 2023-05-15 PROCEDURE — A9585 GADOBUTROL INJECTION: HCPCS | Performed by: OTOLARYNGOLOGY

## 2023-05-15 PROCEDURE — 255N000002 HC RX 255 OP 636: Performed by: OTOLARYNGOLOGY

## 2023-05-15 PROCEDURE — 70553 MRI BRAIN STEM W/O & W/DYE: CPT

## 2023-05-15 RX ORDER — GADOBUTROL 604.72 MG/ML
7.5 INJECTION INTRAVENOUS ONCE
Status: COMPLETED | OUTPATIENT
Start: 2023-05-15 | End: 2023-05-15

## 2023-05-15 RX ADMIN — GADOBUTROL 7.5 ML: 604.72 INJECTION INTRAVENOUS at 13:03

## 2023-05-17 ENCOUNTER — TELEPHONE (OUTPATIENT)
Dept: OTOLARYNGOLOGY | Facility: CLINIC | Age: 88
End: 2023-05-17
Payer: COMMERCIAL

## 2023-05-25 ENCOUNTER — OFFICE VISIT (OUTPATIENT)
Dept: FAMILY MEDICINE | Facility: CLINIC | Age: 88
End: 2023-05-25
Payer: COMMERCIAL

## 2023-05-25 VITALS
HEIGHT: 62 IN | BODY MASS INDEX: 30 KG/M2 | RESPIRATION RATE: 14 BRPM | SYSTOLIC BLOOD PRESSURE: 134 MMHG | TEMPERATURE: 98.4 F | OXYGEN SATURATION: 95 % | HEART RATE: 68 BPM | DIASTOLIC BLOOD PRESSURE: 84 MMHG | WEIGHT: 163 LBS

## 2023-05-25 DIAGNOSIS — J31.0 NON-ALLERGIC RHINITIS: ICD-10-CM

## 2023-05-25 DIAGNOSIS — R42 DIZZINESS: Primary | ICD-10-CM

## 2023-05-25 DIAGNOSIS — I48.20 CHRONIC ATRIAL FIBRILLATION (H): ICD-10-CM

## 2023-05-25 DIAGNOSIS — E23.6: ICD-10-CM

## 2023-05-25 DIAGNOSIS — G31.84 MILD COGNITIVE IMPAIRMENT: ICD-10-CM

## 2023-05-25 PROBLEM — I71.40 ABDOMINAL AORTIC ANEURYSM (AAA) 3.0 CM TO 5.0 CM IN DIAMETER IN FEMALE (H): Status: ACTIVE | Noted: 2023-05-25

## 2023-05-25 PROBLEM — J41.8 MIXED SIMPLE AND MUCOPURULENT CHRONIC BRONCHITIS (H): Status: RESOLVED | Noted: 2018-05-21 | Resolved: 2023-05-25

## 2023-05-25 LAB
T4 FREE SERPL-MCNC: 1.01 NG/DL (ref 0.9–1.7)
TSH SERPL DL<=0.005 MIU/L-ACNC: 4.99 UIU/ML (ref 0.3–4.2)

## 2023-05-25 PROCEDURE — 99215 OFFICE O/P EST HI 40 MIN: CPT | Performed by: INTERNAL MEDICINE

## 2023-05-25 PROCEDURE — 84439 ASSAY OF FREE THYROXINE: CPT | Performed by: INTERNAL MEDICINE

## 2023-05-25 PROCEDURE — 84146 ASSAY OF PROLACTIN: CPT | Performed by: INTERNAL MEDICINE

## 2023-05-25 PROCEDURE — 99000 SPECIMEN HANDLING OFFICE-LAB: CPT | Performed by: INTERNAL MEDICINE

## 2023-05-25 PROCEDURE — 82024 ASSAY OF ACTH: CPT | Performed by: INTERNAL MEDICINE

## 2023-05-25 PROCEDURE — 84443 ASSAY THYROID STIM HORMONE: CPT | Performed by: INTERNAL MEDICINE

## 2023-05-25 PROCEDURE — 84305 ASSAY OF SOMATOMEDIN: CPT | Mod: 90 | Performed by: INTERNAL MEDICINE

## 2023-05-25 PROCEDURE — 83001 ASSAY OF GONADOTROPIN (FSH): CPT | Performed by: INTERNAL MEDICINE

## 2023-05-25 PROCEDURE — 83002 ASSAY OF GONADOTROPIN (LH): CPT | Performed by: INTERNAL MEDICINE

## 2023-05-25 PROCEDURE — 36415 COLL VENOUS BLD VENIPUNCTURE: CPT | Performed by: INTERNAL MEDICINE

## 2023-05-25 ASSESSMENT — PAIN SCALES - GENERAL: PAINLEVEL: NO PAIN (0)

## 2023-05-25 NOTE — PATIENT INSTRUCTIONS
Dizziness  Recommend blood testing  Recommend pituitary MRI  Keep neurology appointment  Follow-up with me end of June or early July  There is not a sinus infection that is causing the symptoms

## 2023-05-25 NOTE — PROGRESS NOTES
"  Assessment & Plan     Dizziness - patient has doctored extensively;  Discussed that a sinus infection is not the culprit as she has suspected - no evidence of chronic sinusitis on CT.  Pituitary macroadenoma vs a-fib vs med side effects vs vestibular vs age related/idiopathic related dizziness.    Hyperplasia of anterior pituitary (H) - reviewed her recent MRI with rads who was not able to visualize pit adequately;  Rads recommend dedicated MR, order placed. Will check hormone levels;  Keep upcoming neuro appointment and follow-up with me after this.  If nothing else if found, may focus more on symptom control.  Will get records from Dizzy/Balance clinic  - MR Pituitary w/o & w Contrast; Future  - Prolactin; Future  - Follicle stimulating hormone; Future  - Luteinizing Hormone; Future  - Insulin-Like Growth Factor 1 Ped; Future  - Adrenal corticotropin; Future  - TSH; Future  - T4, free; Future  - Prolactin  - Follicle stimulating hormone  - Luteinizing Hormone  - Insulin-Like Growth Factor 1 Ped  - Adrenal corticotropin  - TSH  - T4, free    Mild cognitive impairment - per OT    Chronic atrial fibrillation (H) - may contribute?  Explore at follow-up visit    Non-allergic rhinitis - cause of rhinorrhea not sinus infection as patient had assumed        67 minutes spent by me on the date of the encounter doing chart review, patient visit, documentation and discussion with other provider(s)        BMI:   Estimated body mass index is 29.91 kg/m  as calculated from the following:    Height as of this encounter: 1.572 m (5' 1.9\").    Weight as of this encounter: 73.9 kg (163 lb).       Patient Instructions   Dizziness  1. Recommend blood testing  2. Recommend pituitary MRI  3. Keep neurology appointment  4. Follow-up with me end of June or early July  5. There is not a sinus infection that is causing the symptoms       Sade Marquez DO  [facility]    Jael   Rosemarie is a 88 year old, " presenting for the following health issues:  Dizziness        5/25/2023     4:06 PM   Additional Questions   Roomed by Margarita HAIR   Accompanied by Self, daughter Yesi         5/25/2023     4:06 PM   Patient Reported Additional Medications   Patient reports taking the following new medications no new meds     Long term med [facility] in [place], any short term today her [facility].  Discuss gabapentin and Atrovent.     History of Present Illness       Reason for visit:  Dizziness inner right inner ear    She eats 4 or more servings of fruits and vegetables daily.She consumes 1 sweetened beverage(s) daily.She exercises with enough effort to increase her heart rate 30 to 60 minutes per day.  She exercises with enough effort to increase her heart rate 7 days per week. She is missing 2 dose(s) of medications per week.       Dizziness  Onset/Duration: The past couple years. Feel like it might come around when getting hungry.   Description:   Do you feel faint: YES  Does it feel like the surroundings (bed, room) are moving: YES  Unsteady/off balance: YES  Have you passed out or fallen: No  Intensity: moderate- severe   Progression of Symptoms: worsening  Accompanying Signs & Symptoms:  Heart palpitations or chest pain: No  Nausea, vomiting: YES  Weakness or lack of coordination in arms or legs: No  Vision or speech changes: No  Numbness or tingling: No  Ringing in ears (Tinnitus): YES  Hearing Loss: YES- candidate for hearing aids. Look at right ear   History:   Head trauma/concussion history: No  Previous similar symptoms: YES  Recent bleeding history: No  Any new medications (BP?): No  Precipitating factors:   Worse with activity: YES  Worse with head movement: YES  Alleviating factors:   Does staying in a fixed position give relief: Sometimes   Therapies tried and outcome: meclizine  --dizziness for ~ 1 year  --patient feels her dizziness started after a sinus infection and the sinus infection has never cleared up;   She feels that her symptoms always improved after getting antibiotics.  --she has seen ENT who is recommending dizzy/balance testing  --estimates she is having dizzy episodes ~ 2 x week, lasting 10-20 min  --she feels like she may faint during spell, lightheaded  --no rotational component  --episodes are not provoked or worsened by head movements  --they are not provoked by position changes  --she has frontal pain/pressure bilateral and calls this a 'sinus infection'  --she went to dizzy and balance clinic once and 'got so sick from the therapy' that she adamantly declines to do this again. records are not available  --    sinus  --she has had multiple 10-14 day courses of antibiotic - 6/21, 9/21, 2/22      Current Outpatient Medications   Medication Sig Dispense Refill     acetaminophen (TYLENOL) 325 MG tablet Take 325 mg by mouth every 6 hours as needed for mild pain       fluocinolone acetonide oil 0.01 % ear drops Place 0.25 mLs (5 drops) into both ears 2 times daily as needed (ear itching) 20 mL 1     ipratropium (ATROVENT) 0.06 % nasal spray Spray 2 sprays into both nostrils 4 times daily as needed for rhinitis (runny nose) 15 mL 3     levothyroxine (SYNTHROID/LEVOTHROID) 50 MCG tablet TAKE 1 TABLET(50 MCG) BY MOUTH DAILY 30 tablet 0     meclizine (ANTIVERT) 12.5 MG tablet        metoprolol succinate ER (TOPROL XL) 50 MG 24 hr tablet Take 1 tablet (50 mg) by mouth daily Note dose change of tabs. Only take 1 tab 90 tablet 0     nitroGLYcerin (NITROSTAT) 0.4 MG sublingual tablet Place 1 tablet (0.4 mg) under the tongue every 5 minutes as needed for chest pain If you are still having symptoms after 3 doses (15 minutes) call 911. 25 tablet 0     rivaroxaban ANTICOAGULANT (XARELTO) 20 MG TABS tablet Take 20 mg by mouth every morning       vitamin B-2 (RIBOFLAVIN) 25 MG TABS tablet Take 1 tablet by mouth daily       calcium-vitamin D-vitamin K (VIACTIV) 500-500-40 MG-UNT-MCG CHEW Take 1 tablet by mouth daily  "(Patient not taking: Reported on 5/25/2023)       gabapentin (NEURONTIN) 100 MG capsule Take 2 caps in AM and 1 cap during the afternoon/evening if needed (Patient not taking: Reported on 4/24/2023) 90 capsule 1         Review of Systems   Constitutional, HEENT, cardiovascular, pulmonary, gi and gu systems are negative, except as otherwise noted.      Objective    BP (!) 146/90   Pulse 68   Temp 98.4  F (36.9  C) (Tympanic)   Resp 14   Ht 1.572 m (5' 1.9\")   Wt 73.9 kg (163 lb)   SpO2 95%   BMI 29.91 kg/m    Body mass index is 29.91 kg/m .  Physical Exam   GENERAL APPEARANCE: alert and no distress  NEURO: Normal strength and tone, mentation intact, speech normal, DTR symmetrically normal in lower extremities, gait normal including heel/toe/tandem walking, cranial nerves 2-12 intact and rapid alternating movements normal    Results for orders placed or performed in visit on 05/25/23 (from the past 24 hour(s))   TSH   Result Value Ref Range    TSH 4.99 (H) 0.30 - 4.20 uIU/mL   T4, free   Result Value Ref Range    Free T4 1.01 0.90 - 1.70 ng/dL                   "

## 2023-05-26 DIAGNOSIS — R79.89 INCREASED PROLACTIN LEVEL: Primary | ICD-10-CM

## 2023-05-26 LAB
ACTH PLAS-MCNC: 16 PG/ML
FSH SERPL IRP2-ACNC: 115 MIU/ML
LH SERPL-ACNC: 50.2 MIU/ML
PROLACTIN SERPL 3RD IS-MCNC: 27 NG/ML (ref 5–23)

## 2023-05-26 NOTE — RESULT ENCOUNTER NOTE
The TSH is slightly elevated but T4 is normal;  this is not indicative of a thyroid problem.  Other hormone tests are still in process

## 2023-06-02 ENCOUNTER — HOSPITAL ENCOUNTER (OUTPATIENT)
Dept: MRI IMAGING | Facility: CLINIC | Age: 88
Discharge: HOME | End: 2023-06-02
Attending: INTERNAL MEDICINE | Admitting: INTERNAL MEDICINE
Payer: COMMERCIAL

## 2023-06-02 DIAGNOSIS — E23.6: ICD-10-CM

## 2023-06-02 PROCEDURE — 255N000002 HC RX 255 OP 636: Performed by: INTERNAL MEDICINE

## 2023-06-02 PROCEDURE — 70543 MRI ORBT/FAC/NCK W/O &W/DYE: CPT

## 2023-06-02 PROCEDURE — A9585 GADOBUTROL INJECTION: HCPCS | Performed by: INTERNAL MEDICINE

## 2023-06-02 RX ORDER — GADOBUTROL 604.72 MG/ML
7 INJECTION INTRAVENOUS ONCE
Status: COMPLETED | OUTPATIENT
Start: 2023-06-02 | End: 2023-06-02

## 2023-06-02 RX ADMIN — GADOBUTROL 7 ML: 604.72 INJECTION INTRAVENOUS at 18:42

## 2023-06-02 NOTE — LETTER
June 7, 2023      Rosemarie Fry  [address] [address] [facility] [id number]        Dear ,    We are writing to inform you of your test results.    The pituitary gland is very slightly enlarged but there are no masses or tumors seen which is good news.  This, along with normal pituitary gland hormones is reassuring.  Keep appointment with Neurology and myself in July for further work-up of dizziness.     Resulted Orders   MR Pituitary w/o & w Contrast    Narrative    EXAM: MR PITUITARY W/O and W CONTRAST  LOCATION: [facility]  DATE/TIME: 6/2/2023 7:12 PM CDT    INDICATION: Dizziness, concern for abnormal pituitary gland  COMPARISON:  CT sinuses 05/30/2023, MRI brain 9/8/2016 and 5/15/2023  CONTRAST: 7 mL Gadavist  TECHNIQUE: Multiplanar multisequence head MRI without and with intravenous contrast including dedicated imaging of the sella.    FINDINGS:    SELLA: Dedicated imaging of the sella demonstrates normal  signal and enhancement characteristics of the pituitary gland.  The pituitary gland is mildly enlarged without focal lesion or hemorrhage. No suprasellar mass effect. Normal pituitary stalk and   suprasellar structures including the optic chiasm. Normal cavernous sinuses.    INTRACRANIAL CONTENTS: No acute or subacute infarct. No mass, acute hemorrhage, or extra-axial fluid collections.  There are a couple nonspecific foci of T2/FLAIR hyperintense signal in the cerebral white matter. Punctate foci of susceptibility in the   left frontal lobe, likely remote microhemorrhages. Normal ventricles and sulci for age. Again seen is superficial siderosis along the cerebellar folia, suggesting prior subarachnoid hemorrhage. Normal position of the cerebellar tonsils. No pathologic   contrast enhancement.    OTHER: Accounting for technique no additional abnormalities identified.      Impression    IMPRESSION:  1.  Mild pituitary hyperplasia without focal lesion or suprasellar mass  effect. This is unchanged since 05/15/2023.  2.  No acute intracranial abnormality.       If you have any questions or concerns, please call the clinic at the number listed above.       Sincerely,      Sade Marquez, DO

## 2023-06-06 LAB
INSULIN GROWTH FACTOR 1 (EXTERNAL): 107 NG/ML (ref 34–246)
INSULIN GROWTH FACTOR I SD SCORE (EXTERNAL): 0.1 SD

## 2023-06-06 NOTE — RESULT ENCOUNTER NOTE
The pituitary gland is very slightly enlarged but there are no masses or tumors seen which is good news.  This,a long with normal pituitary gland hormones is reassuring.  Keep appointment with Neurology and myself in July for further work-up of dizziness

## 2023-07-13 ENCOUNTER — MYC MEDICAL ADVICE (OUTPATIENT)
Dept: FAMILY MEDICINE | Facility: CLINIC | Age: 88
End: 2023-07-13
Payer: COMMERCIAL

## 2023-08-01 DIAGNOSIS — E06.3 HYPOTHYROIDISM DUE TO HASHIMOTO'S THYROIDITIS: ICD-10-CM

## 2023-08-02 RX ORDER — LEVOTHYROXINE SODIUM 50 UG/1
TABLET ORAL
Qty: 90 TABLET | Refills: 1 | Status: SHIPPED | OUTPATIENT
Start: 2023-08-02 | End: 2024-04-16

## 2023-08-07 NOTE — TELEPHONE ENCOUNTER
[email or web address] [place] location. Denied and return faxed. Previously filled and prescription managed per dispense history Ms. Madelyn Carpenter with [facility] CMM,RN

## 2023-08-09 NOTE — TELEPHONE ENCOUNTER
----- Message from Nancy Fry sent at 8/8/2022 10:01 AM CDT -----  Regarding: FW: EMG  I called her daughter, Janis who said her Mother is getting nauseated and has to rest for a few hours.  It can happen every day and then sometimes it is more mild.  She is dizzy and nauseated right now.  Is this why she wants her to have the CT in February?  She feels she needs the CT sooner.        [phone] Janis      ----- Message -----  From: Addis Neil RN  Sent: 8/8/2022   7:53 AM CDT  To: [facility] Scheduling Registration Pool - [facility]  Subject: EMG                                              Hi,  Are we able to schedule CTA abdomen and pelvis in 6 months?  Thank you - Addis       independent/needs device

## 2023-09-25 ENCOUNTER — MYC MEDICAL ADVICE (OUTPATIENT)
Dept: FAMILY MEDICINE | Facility: CLINIC | Age: 88
End: 2023-09-25

## 2023-09-26 NOTE — TELEPHONE ENCOUNTER
Message from daughter on mychart with complaints about dizziness/bp and back problems.   Called daughter morena to schedule an appt. No answer. Left message to call      Emmanuel Metcalf RN

## 2023-11-24 ENCOUNTER — OFFICE VISIT (OUTPATIENT)
Dept: FAMILY MEDICINE | Facility: CLINIC | Age: 88
End: 2023-11-24
Payer: COMMERCIAL

## 2023-11-24 VITALS
DIASTOLIC BLOOD PRESSURE: 82 MMHG | TEMPERATURE: 97.3 F | WEIGHT: 161.9 LBS | HEART RATE: 82 BPM | RESPIRATION RATE: 16 BRPM | BODY MASS INDEX: 29.79 KG/M2 | HEIGHT: 62 IN | SYSTOLIC BLOOD PRESSURE: 116 MMHG | OXYGEN SATURATION: 97 %

## 2023-11-24 DIAGNOSIS — M81.0 AGE-RELATED OSTEOPOROSIS WITHOUT CURRENT PATHOLOGICAL FRACTURE: ICD-10-CM

## 2023-11-24 DIAGNOSIS — I48.20 CHRONIC ATRIAL FIBRILLATION (H): ICD-10-CM

## 2023-11-24 DIAGNOSIS — I71.40 ABDOMINAL AORTIC ANEURYSM (AAA) 3.0 CM TO 5.0 CM IN DIAMETER IN FEMALE (H): ICD-10-CM

## 2023-11-24 DIAGNOSIS — Z01.818 PREOP GENERAL PHYSICAL EXAM: Primary | ICD-10-CM

## 2023-11-24 DIAGNOSIS — Z98.890 PONV (POSTOPERATIVE NAUSEA AND VOMITING): ICD-10-CM

## 2023-11-24 DIAGNOSIS — R11.2 PONV (POSTOPERATIVE NAUSEA AND VOMITING): ICD-10-CM

## 2023-11-24 DIAGNOSIS — K21.9 GASTROESOPHAGEAL REFLUX DISEASE WITHOUT ESOPHAGITIS: ICD-10-CM

## 2023-11-24 DIAGNOSIS — G31.84 MILD COGNITIVE IMPAIRMENT: ICD-10-CM

## 2023-11-24 DIAGNOSIS — J44.9 CHRONIC OBSTRUCTIVE PULMONARY DISEASE, UNSPECIFIED COPD TYPE (H): ICD-10-CM

## 2023-11-24 DIAGNOSIS — N18.31 CHRONIC KIDNEY DISEASE, STAGE 3A (H): ICD-10-CM

## 2023-11-24 DIAGNOSIS — Z23 NEED FOR PROPHYLACTIC VACCINATION AND INOCULATION AGAINST INFLUENZA: ICD-10-CM

## 2023-11-24 DIAGNOSIS — L40.9 PSORIASIS: ICD-10-CM

## 2023-11-24 DIAGNOSIS — H25.9 SENILE CATARACT, UNSPECIFIED AGE-RELATED CATARACT TYPE, UNSPECIFIED LATERALITY: ICD-10-CM

## 2023-11-24 DIAGNOSIS — R42 DIZZINESS: ICD-10-CM

## 2023-11-24 PROCEDURE — G0008 ADMIN INFLUENZA VIRUS VAC: HCPCS | Performed by: INTERNAL MEDICINE

## 2023-11-24 PROCEDURE — 99214 OFFICE O/P EST MOD 30 MIN: CPT | Mod: 25 | Performed by: INTERNAL MEDICINE

## 2023-11-24 PROCEDURE — 90662 IIV NO PRSV INCREASED AG IM: CPT | Performed by: INTERNAL MEDICINE

## 2023-11-24 RX ORDER — MECLIZINE HYDROCHLORIDE 25 MG/1
25 TABLET ORAL DAILY PRN
Qty: 30 TABLET | Refills: 3 | Status: SHIPPED | OUTPATIENT
Start: 2023-11-24

## 2023-11-24 RX ORDER — TRIAMCINOLONE ACETONIDE 1 MG/G
CREAM TOPICAL 2 TIMES DAILY
Qty: 15 G | Refills: 2 | Status: SHIPPED | OUTPATIENT
Start: 2023-11-24

## 2023-11-24 RX ORDER — METOPROLOL SUCCINATE 25 MG/1
25 TABLET, EXTENDED RELEASE ORAL DAILY
COMMUNITY
End: 2024-01-24

## 2023-11-24 ASSESSMENT — PAIN SCALES - GENERAL: PAINLEVEL: NO PAIN (0)

## 2023-11-24 NOTE — PATIENT INSTRUCTIONS
Preparing for Your Surgery  Getting started  A nurse will call you to review your health history and instructions. They will give you an arrival time based on your scheduled surgery time. Please be ready to share:  Your doctor's clinic name and phone number  Your medical, surgical, and anesthesia history  A list of allergies and sensitivities  A list of medicines, including herbal treatments and over-the-counter drugs  Whether the patient has a legal guardian (ask how to send us the papers in advance)  Please tell us if you're pregnant--or if there's any chance you might be pregnant. Some surgeries may injure a fetus (unborn baby), so they require a pregnancy test. Surgeries that are safe for a fetus don't always need a test, and you can choose whether to have one.   If you have a child who's having surgery, please ask for a copy of Preparing for Your Child's Surgery.    Preparing for surgery  Within 10 to 30 days of surgery: Have a pre-op exam (sometimes called an H&P, or History and Physical). This can be done at a clinic or pre-operative center.  If you're having a , you may not need this exam. Talk to your care team.  At your pre-op exam, talk to your care team about all medicines you take. If you need to stop any medicines before surgery, ask when to start taking them again.  We do this for your safety. Many medicines can make you bleed too much during surgery. Some change how well surgery (anesthesia) drugs work.  Call your insurance company to let them know you're having surgery. (If you don't have insurance, call [phone].)  Call your clinic if there's any change in your health. This includes signs of a cold or flu (sore throat, runny nose, cough, rash, fever). It also includes a scrape or scratch near the surgery site.  If you have questions on the day of surgery, call your hospital or surgery center.  Eating and drinking guidelines  For your safety: Unless your surgeon tells you otherwise,  follow the guidelines below.  Eat and drink as usual until 8 hours before you arrive for surgery. After that, no food or milk.  Drink clear liquids until 2 hours before you arrive. These are liquids you can see through, like water, Gatorade, and Propel Water. They also include plain black coffee and tea (no cream or milk), candy, and breath mints. You can spit out gum when you arrive.  If you drink alcohol: Stop drinking it the night before surgery.  If your care team tells you to take medicine on the morning of surgery, it's okay to take it with a sip of water.  Preventing infection  Shower or bathe the night before and morning of your surgery. Follow the instructions your clinic gave you. (If no instructions, use regular soap.)  Don't shave or clip hair near your surgery site. We'll remove the hair if needed.  Don't smoke or vape the morning of surgery. You may chew nicotine gum up to 2 hours before surgery. A nicotine patch is okay.  Note: Some surgeries require you to completely quit smoking and nicotine. Check with your surgeon.  Your care team will make every effort to keep you safe from infection. We will:  Clean our hands often with soap and water (or an alcohol-based hand rub).  Clean the skin at your surgery site with a special soap that kills germs.  Give you a special gown to keep you warm. (Cold raises the risk of infection.)  Wear special hair covers, masks, gowns and gloves during surgery.  Give antibiotic medicine, if prescribed. Not all surgeries need antibiotics.  What to bring on the day of surgery  Photo ID and insurance card  Copy of your health care directive, if you have one  Glasses and hearing aids (bring cases)  You can't wear contacts during surgery  Inhaler and eye drops, if you use them (tell us about these when you arrive)  CPAP machine or breathing device, if you use them  A few personal items, if spending the night  If you have . . .  A pacemaker, ICD (cardiac defibrillator) or other  implant: Bring the ID card.  An implanted stimulator: Bring the remote control.  A legal guardian: Bring a copy of the certified (court-stamped) guardianship papers.  Please remove any jewelry, including body piercings. Leave jewelry and other valuables at home.  If you're going home the day of surgery  You must have a responsible adult drive you home. They should stay with you overnight as well.  If you don't have someone to stay with you, and you aren't safe to go home alone, we may keep you overnight. Insurance often won't pay for this.  After surgery  If it's hard to control your pain or you need more pain medicine, please call your surgeon's office.  Questions?   If you have any questions for your care team, list them here: _________________________________________________________________________________________________________________________________________________________________________ ____________________________________ ____________________________________ ____________________________________  For informational purposes only. Not to replace the advice of your health care provider. Copyright   2003, 2019 [employer] [facility]. All rights reserved. Clinically reviewed by Ramila Nguyen MD. [employer] 668195 - REV 12/22.    How to Take Your Medication Before Surgery  - Take all of your medications before surgery as usual      Ear itch  --use triamcinolone cream twice a day for 1-2 weeks;  do not use for longer than 2 weeks consecutively, but you can do repeated courses if symptoms come back    Dizziness  --recommend follow-up for further discussion   You can look up Epley maneuver on You tube and try this during episode of dizziness and see if it helps

## 2023-11-24 NOTE — PROGRESS NOTES
[facility]  [address] [address]  Phone: [phone]  Primary Provider: Nataly Kamara  Pre-op Performing Provider: NATALY KAMARA      PREOPERATIVE EVALUATION:  Today's date: 11/24/2023    Rosemarie is a 89 year old, presenting for the following:  Pre-Op Exam (Would like a copy of pre op to take with ) and Imm/Inj (Flu Shot)        11/24/2023     8:05 AM   Additional Questions   Roomed by chaya tesfaye   Accompanied by self         11/24/2023     8:05 AM   Patient Reported Additional Medications   Patient reports taking the following new medications none       Surgical Information:  Surgery/Procedure:  cataracts surgery  Surgery Location: [facility] [place]  Surgeon: DR agosto  Surgery Date: 11/27/23  Time of Surgery: 4PM  Where patient plans to recover: At home with family  Fax number for surgical facility: [phone]    Assessment & Plan     The proposed surgical procedure is considered LOW risk.    Problem List Items Addressed This Visit       Abdominal aortic aneurysm (AAA) 3.0 cm to 5.0 cm in diameter in female (H24)    Cataract, mild-mod, ou    Chronic atrial fibrillation (H)    Chronic kidney disease, stage 3a (H)    Chronic obstructive pulmonary disease, unspecified COPD type (H)    GERD (gastroesophageal reflux disease)    Relevant Medications    meclizine (ANTIVERT) 25 MG tablet    Mild cognitive impairment    Osteoporosis     Other Visit Diagnoses       Preop general physical exam    -  Primary    PONV (postoperative nausea and vomiting)        Need for prophylactic vaccination and inoculation against influenza        Psoriasis        Relevant Medications    triamcinolone (KENALOG) 0.1 % external cream    Dizziness        Relevant Medications    meclizine (ANTIVERT) 25 MG tablet                    - No identified additional risk factors other than previously addressed    Antiplatelet or Anticoagulation Medication Instructions:  Ok to continue  xarelto as normal    Additional Medication Instructions:  Patient is to take all scheduled medications on the day of surgery    RECOMMENDATION:  APPROVAL GIVEN to proceed with proposed procedure, without further diagnostic evaluation.            Subjective       HPI related to upcoming procedure: cataracts needing surgical management          11/24/2023     1:50 PM   Preop Questions   1. Have you ever had a heart attack or stroke? No   2. Have you ever had surgery on your heart or blood vessels, such as a stent placement, a coronary artery bypass, or surgery on an artery in your head, neck, heart, or legs? No   3. Do you have chest pain with activity? No   4. Do you have a history of  heart failure? No   5. Do you currently have a cold, bronchitis or symptoms of other infection? No   6. Do you have a cough, shortness of breath, or wheezing? No   7. Do you or anyone in your family have previous history of blood clots? UNKNOWN - not sure   8. Do you or does anyone in your family have a serious bleeding problem such as prolonged bleeding following surgeries or cuts? No   9. Have you ever had problems with anemia or been told to take iron pills? No   10. Have you had any abnormal blood loss such as black, tarry or bloody stools, or abnormal vaginal bleeding? No   11. Have you ever had a blood transfusion? No   12. Are you willing to have a blood transfusion if it is medically needed before, during, or after your surgery? Yes   13. Have you or any of your relatives ever had problems with anesthesia? UNKNOWN - not sure    14. Do you have sleep apnea, excessive snoring or daytime drowsiness? No   15. Do you have any artifical heart valves or other implanted medical devices like a pacemaker, defibrillator, or continuous glucose monitor? No   16. Do you have artificial joints? No   17. Are you allergic to latex? No     Health Care Directive:  Patient has a Health Care Directive on file      Preoperative Review of :    reviewed - no record of controlled substances prescribed.          AAA  -- She has a 2.6 cm saccular outpouching of the aorta resulting in an aortic aneurysm less than 4 cm in diameter.  She has been seen by vascular and due to age and medical comorbidities, there is no role for intervention.    A-fib  --She follows with cardiology, is on Xarelto and metoprolol    COPD   -- Mild, not on inhalers    Mild cognitive impairment  Occupational Therapy performed MOCA testing 11/2022 and score was 14/30.  Patient declined formal cognitive evaluation    History of post-op Nausea and vomiting  --after back injection in the past    Review of Systems  CONSTITUTIONAL: NEGATIVE for fever, chills, change in weight  ENT/MOUTH: NEGATIVE for ear, mouth and throat problems  RESP: NEGATIVE for significant cough or SOB  CV: NEGATIVE for chest pain, palpitations or peripheral edema    Patient Active Problem List    Diagnosis Date Noted    Chronic kidney disease, stage 3a (H) 11/24/2023     Priority: Medium    Abdominal aortic aneurysm (AAA) 3.0 cm to 5.0 cm in diameter in female (H24) 05/25/2023     Priority: Medium     Follows with vascular  Patient with 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  Stable for more than a year now.  Not symptomatic. Patient with 2.6 cm saccular outpouching of the aorta resulting in an aortic aneurysm of less than 4 cm in maximum diameter.  At her age and given its stability no role for intervention      Mild cognitive impairment 05/25/2023     Priority: Medium     OT MOCA testing 11/2022 14/30; patient declined formal cognitive testing      Subclinical hypothyroidism 09/08/2016     Priority: Medium    Chronic atrial fibrillation (H) 01/26/2016     Priority: Medium     Follows with cardiology - on xarelto and metoprolol      Cataract, mild-mod, ou 05/17/2015     Priority: Medium    Posterior vitreous detachment, ou 05/17/2015     Priority:  Medium    Glaucoma suspect, ou 05/17/2015     Priority: Medium    Diagnostic skin and sensitization tests (aka ALLERGENS)      Priority: Medium     skin test 5/5/10 pos. for:  cat/dog/T/RW      Varicose veins of legs 07/03/2013     Priority: Medium    Shoulder impingement 02/11/2013     Priority: Medium    Bilateral low back pain without sciatica, unspecified chronicity 11/14/2012     Priority: Medium    Chronic obstructive pulmonary disease, unspecified COPD type (H) 08/15/2012     Priority: Medium    Advance Care Planning 08/08/2012     Priority: Medium     Advance Care Planning 5/26/2015: Receipt of ACP document:  Received: Health Care Directive which was witnessed or notarized on 2-4-15.  Document previously scanned on 2-5-15.  Validation form previously scanned.  Code Status needs to be updated to reflect choices in most recent ACP document. Confirmed/documented designated decision maker(s).  Added by Ethel Vance RN  Advance Care Planning  Discussed advance care planning with patient; however, patient declined at this time. 8/8/2012   Daughter states she has control over everything, knows orders for mother      GERD (gastroesophageal reflux disease) 03/09/2011     Priority: Medium    Osteoporosis 12/15/2010     Priority: Medium    CARDIOVASCULAR SCREENING; LDL GOAL LESS THAN 160 10/31/2010     Priority: Medium    Seasonal allergic rhinitis      Priority: Medium    Allergic rhinitis due to animal dander      Priority: Medium      Past Medical History:   Diagnosis Date    Allergic rhinitis due to animal dander     Atrial fibrillation (H)     Xarelto    Cataract, mild-mod, ou 5/17/2015    Diagnostic skin and sensitization tests (aka ALLERGENS)     Skin test 5/5/10 pos. for:  cat/dog/T/RW    Ex-smoker     Lyme disease 1999     Giancarlo    Osteoporosis     Seasonal allergic rhinitis skin test 5/5/10 pos. for:  cat/dog/T/RW     Past Surgical History:   Procedure Laterality Date    HYSTERECTOMY      LAPAROSCOPIC  ASSISTED HYSTERECTOMY VAGINAL      ZZC APPENDECTOMY       Current Outpatient Medications   Medication Sig Dispense Refill    acetaminophen (TYLENOL) 325 MG tablet Take 325 mg by mouth every 6 hours as needed for mild pain      calcium-vitamin D-vitamin K (VIACTIV) 500-500-40 MG-UNT-MCG CHEW Take 1 tablet by mouth daily      fluocinolone acetonide oil 0.01 % ear drops Place 0.25 mLs (5 drops) into both ears 2 times daily as needed (ear itching) 20 mL 1    ipratropium (ATROVENT) 0.06 % nasal spray Spray 2 sprays into both nostrils 4 times daily as needed for rhinitis (runny nose) 15 mL 3    levothyroxine (SYNTHROID/LEVOTHROID) 50 MCG tablet TAKE 1 TABLET(50 MCG) BY MOUTH DAILY 90 tablet 1    meclizine (ANTIVERT) 12.5 MG tablet       metoprolol succinate ER (TOPROL XL) 25 MG 24 hr tablet Take 25 mg by mouth daily      metoprolol succinate ER (TOPROL XL) 50 MG 24 hr tablet Take 1 tablet (50 mg) by mouth daily Note dose change of tabs. Only take 1 tab 90 tablet 0    nitroGLYcerin (NITROSTAT) 0.4 MG sublingual tablet Place 1 tablet (0.4 mg) under the tongue every 5 minutes as needed for chest pain If you are still having symptoms after 3 doses (15 minutes) call 911. 25 tablet 0    rivaroxaban ANTICOAGULANT (XARELTO) 20 MG TABS tablet Take 20 mg by mouth every morning      triamcinolone (KENALOG) 0.1 % external cream Apply topically 2 times daily Apply to outer ear 15 g 2    vitamin B-2 (RIBOFLAVIN) 25 MG TABS tablet Take 1 tablet by mouth daily         Allergies   Allergen Reactions    Sulfa Antibiotics Rash        Social History     Tobacco Use    Smoking status: Former     Packs/day: 1.00     Years: 10.00     Additional pack years: 0.00     Total pack years: 10.00     Types: Cigarettes     Quit date: 2007     Years since quittin.6    Smokeless tobacco: Never   Substance Use Topics    Alcohol use: No     Alcohol/week: 0.0 standard drinks of alcohol     Family History   Problem Relation Age of Onset    Diabetes  "Brother     Hypertension Brother     Asthma Daughter     Cancer No family hx of     Cerebrovascular Disease No family hx of     Thyroid Disease No family hx of     Glaucoma No family hx of     Macular Degeneration No family hx of      History   Drug Use No         Objective     /82 (BP Location: Right arm, Patient Position: Sitting, Cuff Size: Adult Regular)   Pulse 82   Temp 97.3  F (36.3  C) (Tympanic)   Resp 16   Ht 1.575 m (5' 2.01\")   Wt 73.4 kg (161 lb 14.4 oz)   SpO2 97%   BMI 29.60 kg/m      Physical Exam  GENERAL APPEARANCE: healthy, alert and no distress  HENT: scaling in bilateral ear canals.  MMM  RESP: lungs clear to auscultation - no rales, rhonchi or wheezes  CV: regular rate and rhythm, normal S1 S2, no S3 or S4 and no murmur, click or rub   ABDOMEN: soft, nontender, no HSM or masses and bowel sounds normal  NEURO: Normal strength and tone, sensory exam grossly normal, mentation intact and speech normal    Recent Labs   Lab Test 02/13/23  1352 11/03/22  1902 07/06/22  1658 04/23/22  1123   HGB  --   --  12.3 12.9   PLT  --   --  249 226   NA  --   --  139 140   POTASSIUM  --   --  5.2 4.1   CR 1.2* 1.2* 1.14* 1.01        Diagnostics:  No labs were ordered during this visit.   No EKG required for low risk surgery (cataract, skin procedure, breast biopsy, etc).    Revised Cardiac Risk Index (RCRI):  The patient has the following serious cardiovascular risks for perioperative complications:   - No serious cardiac risks = 0 points     RCRI Interpretation: 0 points: Class I (very low risk - 0.4% complication rate)         Signed Electronically by: Sade Marquez DO  Copy of this evaluation report is provided to requesting physician.      "

## 2024-01-24 ENCOUNTER — OFFICE VISIT (OUTPATIENT)
Dept: FAMILY MEDICINE | Facility: CLINIC | Age: 89
End: 2024-01-24
Payer: COMMERCIAL

## 2024-01-24 VITALS
RESPIRATION RATE: 12 BRPM | BODY MASS INDEX: 28.4 KG/M2 | SYSTOLIC BLOOD PRESSURE: 128 MMHG | HEIGHT: 63 IN | WEIGHT: 160.3 LBS | OXYGEN SATURATION: 98 % | DIASTOLIC BLOOD PRESSURE: 80 MMHG | HEART RATE: 87 BPM | TEMPERATURE: 97.4 F

## 2024-01-24 DIAGNOSIS — Z01.818 PREOP GENERAL PHYSICAL EXAM: Primary | ICD-10-CM

## 2024-01-24 DIAGNOSIS — R42 DIZZINESS: ICD-10-CM

## 2024-01-24 DIAGNOSIS — J44.9 CHRONIC OBSTRUCTIVE PULMONARY DISEASE, UNSPECIFIED COPD TYPE (H): ICD-10-CM

## 2024-01-24 DIAGNOSIS — H26.9 CATARACT OF BOTH EYES, UNSPECIFIED CATARACT TYPE: ICD-10-CM

## 2024-01-24 DIAGNOSIS — G31.84 MILD COGNITIVE IMPAIRMENT: ICD-10-CM

## 2024-01-24 DIAGNOSIS — I48.20 CHRONIC ATRIAL FIBRILLATION (H): ICD-10-CM

## 2024-01-24 DIAGNOSIS — N18.31 CHRONIC KIDNEY DISEASE, STAGE 3A (H): ICD-10-CM

## 2024-01-24 DIAGNOSIS — K21.9 GASTROESOPHAGEAL REFLUX DISEASE WITHOUT ESOPHAGITIS: ICD-10-CM

## 2024-01-24 PROCEDURE — 99214 OFFICE O/P EST MOD 30 MIN: CPT | Performed by: INTERNAL MEDICINE

## 2024-01-24 ASSESSMENT — PAIN SCALES - GENERAL: PAINLEVEL: SEVERE PAIN (7)

## 2024-01-24 NOTE — PATIENT INSTRUCTIONS
Preparing for Your Surgery  Getting started  A nurse will call you to review your health history and instructions. They will give you an arrival time based on your scheduled surgery time. Please be ready to share:  Your doctor's clinic name and phone number  Your medical, surgical, and anesthesia history  A list of allergies and sensitivities  A list of medicines, including herbal treatments and over-the-counter drugs  Whether the patient has a legal guardian (ask how to send us the papers in advance)  Please tell us if you're pregnant--or if there's any chance you might be pregnant. Some surgeries may injure a fetus (unborn baby), so they require a pregnancy test. Surgeries that are safe for a fetus don't always need a test, and you can choose whether to have one.   If you have a child who's having surgery, please ask for a copy of Preparing for Your Child's Surgery.    Preparing for surgery  Within 10 to 30 days of surgery: Have a pre-op exam (sometimes called an H&P, or History and Physical). This can be done at a clinic or pre-operative center.  If you're having a , you may not need this exam. Talk to your care team.  At your pre-op exam, talk to your care team about all medicines you take. If you need to stop any medicines before surgery, ask when to start taking them again.  We do this for your safety. Many medicines can make you bleed too much during surgery. Some change how well surgery (anesthesia) drugs work.  Call your insurance company to let them know you're having surgery. (If you don't have insurance, call [phone].)  Call your clinic if there's any change in your health. This includes signs of a cold or flu (sore throat, runny nose, cough, rash, fever). It also includes a scrape or scratch near the surgery site.  If you have questions on the day of surgery, call your hospital or surgery center.  Eating and drinking guidelines  For your safety: Unless your surgeon tells you otherwise,  follow the guidelines below.  Eat and drink as usual until 8 hours before you arrive for surgery. After that, no food or milk.  Drink clear liquids until 2 hours before you arrive. These are liquids you can see through, like water, Gatorade, and Propel Water. They also include plain black coffee and tea (no cream or milk), candy, and breath mints. You can spit out gum when you arrive.  If you drink alcohol: Stop drinking it the night before surgery.  If your care team tells you to take medicine on the morning of surgery, it's okay to take it with a sip of water.  Preventing infection  Shower or bathe the night before and morning of your surgery. Follow the instructions your clinic gave you. (If no instructions, use regular soap.)  Don't shave or clip hair near your surgery site. We'll remove the hair if needed.  Don't smoke or vape the morning of surgery. You may chew nicotine gum up to 2 hours before surgery. A nicotine patch is okay.  Note: Some surgeries require you to completely quit smoking and nicotine. Check with your surgeon.  Your care team will make every effort to keep you safe from infection. We will:  Clean our hands often with soap and water (or an alcohol-based hand rub).  Clean the skin at your surgery site with a special soap that kills germs.  Give you a special gown to keep you warm. (Cold raises the risk of infection.)  Wear special hair covers, masks, gowns and gloves during surgery.  Give antibiotic medicine, if prescribed. Not all surgeries need antibiotics.  What to bring on the day of surgery  Photo ID and insurance card  Copy of your health care directive, if you have one  Glasses and hearing aids (bring cases)  You can't wear contacts during surgery  Inhaler and eye drops, if you use them (tell us about these when you arrive)  CPAP machine or breathing device, if you use them  A few personal items, if spending the night  If you have . . .  A pacemaker, ICD (cardiac defibrillator) or other  implant: Bring the ID card.  An implanted stimulator: Bring the remote control.  A legal guardian: Bring a copy of the certified (court-stamped) guardianship papers.  Please remove any jewelry, including body piercings. Leave jewelry and other valuables at home.  If you're going home the day of surgery  You must have a responsible adult drive you home. They should stay with you overnight as well.  If you don't have someone to stay with you, and you aren't safe to go home alone, we may keep you overnight. Insurance often won't pay for this.  After surgery  If it's hard to control your pain or you need more pain medicine, please call your surgeon's office.  Questions?   If you have any questions for your care team, list them here: _________________________________________________________________________________________________________________________________________________________________________ ____________________________________ ____________________________________ ____________________________________  For informational purposes only. Not to replace the advice of your health care provider. Copyright   2003, 2019 [employer] [facility]. All rights reserved. Clinically reviewed by Ramila Nguyen MD. [employer] 326564 - REV 12/22.    How to Take Your Medication Before Surgery  - Take all of your medications before surgery as usual

## 2024-01-24 NOTE — PROGRESS NOTES
Preoperative Evaluation  [facility]  [address] [address]  Phone: [phone]  Primary Provider: Nataly Kamara  Pre-op Performing Provider: NATALY KAMARA  Jan 24, 2024       Rosemarie is a 89 year old, presenting for the following:  Pre-Op Exam        1/24/2024    10:30 AM   Additional Questions   Roomed by chaya tesfaye   Accompanied by self         1/24/2024    10:30 AM   Patient Reported Additional Medications   Patient reports taking the following new medications none     Surgical Information  Surgery/Procedure: phacoemulsification/intraocular lens right   Surgery Location: associated eye care [facility]  Surgeon:  DR. Yousif agosto   Surgery Date: 2/5/24 - right eye    2/19/2024-Left eye   Time of Surgery: TBD  Where patient plans to recover: At home with family  Fax number for surgical facility: [phone]    Assessment & Plan     The proposed surgical procedure is considered LOW risk.    Problem List Items Addressed This Visit       Cataract, mild-mod, ou    Chronic atrial fibrillation (H)    Chronic kidney disease, stage 3a (H)    Chronic obstructive pulmonary disease, unspecified COPD type (H)    GERD (gastroesophageal reflux disease)    Mild cognitive impairment     Other Visit Diagnoses       Preop general physical exam    -  Primary    Dizziness                        - No identified additional risk factors other than previously addressed    Antiplatelet or Anticoagulation Medication Instructions   - Bleeding risk is low for this procedure (e.g. dental, skin, cataract).    Additional Medication Instructions  Patient is to take all scheduled medications on the day of surgery    Recommendation  APPROVAL GIVEN to proceed with proposed procedure, without further diagnostic evaluation.          Subjective       HPI related to upcoming procedure: staged cataract surgery    She had recent cellulitis of eye and face;  seen at urgent care 1/9, given Keflex  and erythromycin ophthalmic eye ointment  Symptoms are much improved, but there is still a bit of redness;  finished antibiotic 1 week ago;  swelling, erythema, pain are 99.9% improved        1/24/2024     3:49 PM   Preop Questions   1. Have you ever had a heart attack or stroke? No   2. Have you ever had surgery on your heart or blood vessels, such as a stent placement, a coronary artery bypass, or surgery on an artery in your head, neck, heart, or legs? No   3. Do you have chest pain with activity? No   4. Do you have a history of  heart failure? No   5. Do you currently have a cold, bronchitis or symptoms of other infection? Yes- running nose chronic   6. Do you have a cough, shortness of breath, or wheezing? Yes- cough   7. Do you or anyone in your family have previous history of blood clots? No   8. Do you or does anyone in your family have a serious bleeding problem such as prolonged bleeding following surgeries or cuts? No   9. Have you ever had problems with anemia or been told to take iron pills? No   10. Have you had any abnormal blood loss such as black, tarry or bloody stools, or abnormal vaginal bleeding? No   11. Have you ever had a blood transfusion? No   12. Are you willing to have a blood transfusion if it is medically needed before, during, or after your surgery? Yes   13. Have you or any of your relatives ever had problems with anesthesia? Yes- self nausea    14. Do you have sleep apnea, excessive snoring or daytime drowsiness? No   15. Do you have any artifical heart valves or other implanted medical devices like a pacemaker, defibrillator, or continuous glucose monitor? No   16. Do you have artificial joints? No   17. Are you allergic to latex? No     Health Care Directive  Patient has a Health Care Directive on file      Preoperative Review of    reviewed - no record of controlled substances prescribed.      Status of Chronic Conditions:  See problem list for active medical problems.   Problems all longstanding and stable, except as noted/documented.  See ROS for pertinent symptoms related to these conditions.    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  Stable for more than a year now.  Not symptomatic. Patient with 2.6 cm saccular outpouching of the aorta resulting in an aortic aneurysm of less than 4 cm in maximum diameter.  At her age and given its stability no role for intervention      Mild cognitive impairment 05/25/2023     Priority: Medium     OT MOCA testing 11/2022 14/30; patient declined formal cognitive testing      Subclinical hypothyroidism 09/08/2016     Priority: Medium    Chronic atrial fibrillation (H) 01/26/2016     Priority: Medium     Follows with cardiology - on xarelto and metoprolol      Cataract, mild-mod, ou 05/17/2015     Priority: Medium    Posterior vitreous detachment, ou 05/17/2015     Priority: Medium    Glaucoma suspect, ou 05/17/2015     Priority: Medium    Diagnostic skin and sensitization tests (aka ALLERGENS)      Priority: Medium     skin test 5/5/10 pos. for:  cat/dog/T/RW      Varicose veins of legs 07/03/2013     Priority: Medium    Shoulder impingement 02/11/2013     Priority: Medium    Bilateral low back pain without sciatica, unspecified chronicity 11/14/2012     Priority: Medium    Chronic obstructive pulmonary disease, unspecified COPD type (H) 08/15/2012     Priority: Medium    Advance Care Planning 08/08/2012     Priority: Medium     Advance Care Planning 5/26/2015: Receipt of ACP document:  Received: Health Care Directive which was witnessed or notarized on 2-4-15.  Document previously scanned on 2-5-15.   Validation form previously scanned.  Code Status needs to be updated to reflect choices in most recent ACP document. Confirmed/documented designated decision maker(s).  Added by Ethel Vance RN  Advance Care Planning  Discussed advance care planning with patient; however, patient declined at this time. 8/8/2012   Daughter states she has control over everything, knows orders for mother      GERD (gastroesophageal reflux disease) 03/09/2011     Priority: Medium    Osteoporosis 12/15/2010     Priority: Medium    CARDIOVASCULAR SCREENING; LDL GOAL LESS THAN 160 10/31/2010     Priority: Medium    Seasonal allergic rhinitis      Priority: Medium    Allergic rhinitis due to animal dander      Priority: Medium      Past Medical History:   Diagnosis Date    Allergic rhinitis due to animal dander     Atrial fibrillation (H)     Xarelto    Cataract, mild-mod, ou 5/17/2015    Diagnostic skin and sensitization tests (aka ALLERGENS)     Skin test 5/5/10 pos. for:  cat/dog/T/RW    Ex-smoker     Lyme disease 1999     Giancarlo    Osteoporosis     Seasonal allergic rhinitis skin test 5/5/10 pos. for:  cat/dog/T/RW     Past Surgical History:   Procedure Laterality Date    HYSTERECTOMY      LAPAROSCOPIC ASSISTED HYSTERECTOMY VAGINAL      ZZC APPENDECTOMY       Current Outpatient Medications   Medication Sig Dispense Refill    acetaminophen (TYLENOL) 325 MG tablet Take 325 mg by mouth every 6 hours as needed for mild pain      calcium-vitamin D-vitamin K (VIACTIV) 500-500-40 MG-UNT-MCG CHEW Take 1 tablet by mouth daily      fluocinolone acetonide oil 0.01 % ear drops Place 0.25 mLs (5 drops) into both ears 2 times daily as needed (ear itching) 20 mL 1    ipratropium (ATROVENT) 0.06 % nasal spray Spray 2 sprays into both nostrils 4 times daily as needed for rhinitis (runny nose) 15 mL 3    levothyroxine (SYNTHROID/LEVOTHROID) 50 MCG tablet TAKE 1 TABLET(50 MCG) BY MOUTH DAILY 90 tablet 1    meclizine (ANTIVERT) 25 MG tablet Take 1  "tablet (25 mg) by mouth daily as needed for dizziness 30 tablet 3    metoprolol succinate ER (TOPROL XL) 50 MG 24 hr tablet Take 1 tablet (50 mg) by mouth daily Note dose change of tabs. Only take 1 tab 90 tablet 0    nitroGLYcerin (NITROSTAT) 0.4 MG sublingual tablet Place 1 tablet (0.4 mg) under the tongue every 5 minutes as needed for chest pain If you are still having symptoms after 3 doses (15 minutes) call 911. 25 tablet 0    rivaroxaban ANTICOAGULANT (XARELTO) 20 MG TABS tablet Take 20 mg by mouth every morning      triamcinolone (KENALOG) 0.1 % external cream Apply topically 2 times daily Apply to outer ear 15 g 2    vitamin B-2 (RIBOFLAVIN) 25 MG TABS tablet Take 1 tablet by mouth daily         Allergies   Allergen Reactions    Sulfa Antibiotics Rash        Social History     Tobacco Use    Smoking status: Former     Packs/day: 1.00     Years: 10.00     Additional pack years: 0.00     Total pack years: 10.00     Types: Cigarettes     Quit date: 2007     Years since quittin.7    Smokeless tobacco: Never   Substance Use Topics    Alcohol use: No     Alcohol/week: 0.0 standard drinks of alcohol     Family History   Problem Relation Age of Onset    Diabetes Brother     Hypertension Brother     Asthma Daughter     Cancer No family hx of     Cerebrovascular Disease No family hx of     Thyroid Disease No family hx of     Glaucoma No family hx of     Macular Degeneration No family hx of      History   Drug Use No         Review of Systems    Review of Systems  Constitutional, HEENT, cardiovascular, pulmonary, gi and gu systems are negative, except as otherwise noted.  Objective    /80 (BP Location: Right arm, Patient Position: Sitting, Cuff Size: Adult Regular)   Pulse 87   Temp 97.4  F (36.3  C) (Tympanic)   Resp 12   Ht 1.595 m (5' 2.8\")   Wt 72.7 kg (160 lb 4.8 oz)   SpO2 98%   BMI 28.58 kg/m     Estimated body mass index is 28.58 kg/m  as calculated from the following:    Height as of " "this encounter: 1.595 m (5' 2.8\").    Weight as of this encounter: 72.7 kg (160 lb 4.8 oz).  Physical Exam  GENERAL: alert and no distress  EYES: Eyes grossly normal to inspection, PERRL and conjunctivae and sclerae normal  HENT: ear canals and TM's normal, nose and mouth without ulcers or lesions  NECK: no adenopathy, no asymmetry, masses, or scars  RESP: lungs clear to auscultation - no rales, rhonchi or wheezes  CV: irregularly irregular rhythm, normal S1 S2, no S3 or S4, and no murmur, click or rub  ABDOMEN: soft, nontender, no hepatosplenomegaly, no masses and bowel sounds normal  MS: no gross musculoskeletal defects noted, no edema  SKIN: no suspicious lesions or rashes  NEURO: Normal strength and tone, mentation intact and speech normal  PSYCH: mentation appears normal, affect normal/bright  LYMPH: no cervical, supraclavicular, axillary, or inguinal adenopathy    Recent Labs   Lab Test 02/13/23  1352 11/03/22  1902 07/06/22  1658 04/23/22  1123   HGB  --   --  12.3 12.9   PLT  --   --  249 226   NA  --   --  139 140   POTASSIUM  --   --  5.2 4.1   CR 1.2* 1.2* 1.14* 1.01        Diagnostics  No labs were ordered during this visit.   No EKG required for low risk surgery (cataract, skin procedure, breast biopsy, etc).    Revised Cardiac Risk Index (RCRI)  The patient has the following serious cardiovascular risks for perioperative complications:   - No serious cardiac risks = 0 points     RCRI Interpretation: 0 points: Class I (very low risk - 0.4% complication rate)         Signed Electronically by: Sade Marquez DO  Copy of this evaluation report is provided to requesting physician.         "

## 2024-01-29 ENCOUNTER — TELEPHONE (OUTPATIENT)
Dept: CARDIOLOGY | Facility: CLINIC | Age: 89
End: 2024-01-29
Payer: COMMERCIAL

## 2024-01-29 NOTE — TELEPHONE ENCOUNTER
[facility] Call Center    Phone Message    May a detailed message be left on voicemail: yes     Reason for Call: Other: Pt Daughter would like a call back to discuss switching all heart medication to be filled by Dr. Gramajo as pt is getting medication from cardio Dr at [facility]. Please reach out to discuss     Action Taken: Other: Cardio    Travel Screening: Not Applicable

## 2024-01-29 NOTE — TELEPHONE ENCOUNTER
Return call to patient's daughter - informed Yesi that Rx's would need to be filled under PCP until patient establishes care with Dr. Gramajo 4-29-24 - explained that Dr. Plascencia, who patient had last seen 4-24-23, is no longer with [facility].    Yesi explained that patient's Rx's for Metoprolol Succ and Xarelto are being filled under another cardiologist and she is trying to find out why.    Informed Yesi that if cardiologist prescribing / refilling Rx is not within [facility] system, writer unable to see who is refilling them, but requests sent to Dr. Plascencia after she left practice, would have been denied until patient establishes care with new provider - Yesi verbalized understanding and confirmed patient has enough medication until she sees Dr. Gramajo in April.  mg

## 2024-02-15 ENCOUNTER — MYC MEDICAL ADVICE (OUTPATIENT)
Dept: FAMILY MEDICINE | Facility: CLINIC | Age: 89
End: 2024-02-15
Payer: COMMERCIAL

## 2024-02-15 ENCOUNTER — TELEPHONE (OUTPATIENT)
Dept: VASCULAR SURGERY | Facility: CLINIC | Age: 89
End: 2024-02-15
Payer: COMMERCIAL

## 2024-02-15 DIAGNOSIS — I72.9 ANEURYSM (H): Primary | ICD-10-CM

## 2024-02-15 NOTE — TELEPHONE ENCOUNTER
Janis updated that it will need to be discussed with PCP again. CTA 11/2022 showed no carotid stenosis below. Informed her we would not address the dizziness. Needs to possibly see a neurologist but should discuss with PCP.     NECK CTA:  RIGHT CAROTID: Normal course and persist caliber of the common carotid artery. Atherosclerotic disease at the carotid artery bifurcation without stenosis or dissection.     LEFT CAROTID: Normal course and persist caliber of the common carotid artery. Atherosclerotic disease at the carotid artery bifurcation without stenosis or dissection.

## 2024-02-15 NOTE — TELEPHONE ENCOUNTER
Caller: Janis Stanley     Provider: ALIA Salter    Detailed reason for call: Patient is scheduled for CT and AAA follow up with Yue in March, and daughter is wondering if additional imaging can be ordered for patient's head/brain. Daughter states that patient will frequently have inexplainable dizzy spells, and they have talked with PCP in the past about this but they did not seem concerned; daughter is wondering if patient may have another aneurysm in the brain.    Best phone number to contact: [phone]    Best time to contact: Any time    Ok to leave a detailed message: Yes    Ok to speak to authorized person if needed: Yes: Janis      (Noted to patient if reason is related to wound or incision, to please send a photo via email or [employer].)

## 2024-02-16 NOTE — TELEPHONE ENCOUNTER
Routed to provider.  Please see [employer] message requesting referral to neurology due to dizziness.  Ginny Feliciano RN

## 2024-02-20 NOTE — TELEPHONE ENCOUNTER
RECORDS RECEIVED FROM: Internal    REASON FOR VISIT: Brain Aneurysm   PROVIDER: Colt Amezcua MD   DATE OF APPT: 4/16/24 @ 3:00 pm    NOTES (FOR ALL VISITS) STATUS DETAILS   OFFICE NOTE from referring provider Internal 2/15/24 (MyChart), 1/24/24, 11/24/23 Sade Marquez,  @[facility]    See Additional Encounters   OFFICE NOTE from other specialist Internal 5/1/23 Julissa Mejia AuD @[facility] Audio    5/1/23 Aravind Hunt MD @[facility] ENT    10/19/22 [facility]ks[facility]  [facility]     MEDICATION LIST Internal    IMAGING  (FOR ALL VISITS)     MRI (HEAD, NECK, SPINE) Internal [facility]  6/2/23 MR Pituitary  5/15/23 MR Brain     CT (HEAD, NECK, SPINE) Internal [facility]  11/3/22 CTA Head Neck  7/25/22 CT Head

## 2024-03-11 NOTE — PATIENT INSTRUCTIONS
Matt Houston,    Thank you for entrusting your care with us today. After your visit today with ALIA Salter this is the plan that was discussed at your appointment.    Have a repeat CT abdomen/pelvis in 1 year and you will follow up in clinic at that time.    We will call you closer to the date to get you scheduled.      I am including additional information on these things and our contact information if you have any questions or concerns.   Please do not hesitate to reach out to us if you felt we did not answer your questions or you are unsure of the treatment plan after your visit today. Our number is [phone].Thank you for trusting us with your care.         Again thank you for your time.

## 2024-03-11 NOTE — PROGRESS NOTES
[facility]        Patient is here for a 1 year F/U- AAA . CT done 3/14     Pt is currently taking Xarelto.    BP (!) 156/90   Pulse 70   Temp 97.3  F (36.3  C)   Resp 18     The provider has been notified that the patient has concerns of dizziness. Takes Meclinizine    Questions patient would like addressed today are: N/A.    Refills are needed: No    Has homecare services and agency name:  No

## 2024-03-14 ENCOUNTER — HOSPITAL ENCOUNTER (OUTPATIENT)
Dept: CT IMAGING | Facility: HOSPITAL | Age: 89
Discharge: HOME | End: 2024-03-14
Attending: SURGERY | Admitting: SURGERY
Payer: COMMERCIAL

## 2024-03-14 DIAGNOSIS — I71.40 ABDOMINAL AORTIC ANEURYSM (AAA) 3.0 CM TO 5.0 CM IN DIAMETER IN FEMALE (H): ICD-10-CM

## 2024-03-14 PROCEDURE — 74176 CT ABD & PELVIS W/O CONTRAST: CPT

## 2024-03-18 ENCOUNTER — OFFICE VISIT (OUTPATIENT)
Dept: VASCULAR SURGERY | Facility: CLINIC | Age: 89
End: 2024-03-18
Attending: SURGERY
Payer: COMMERCIAL

## 2024-03-18 ENCOUNTER — MYC MEDICAL ADVICE (OUTPATIENT)
Dept: FAMILY MEDICINE | Facility: CLINIC | Age: 89
End: 2024-03-18
Payer: COMMERCIAL

## 2024-03-18 VITALS
TEMPERATURE: 97.3 F | HEART RATE: 70 BPM | RESPIRATION RATE: 18 BRPM | DIASTOLIC BLOOD PRESSURE: 90 MMHG | SYSTOLIC BLOOD PRESSURE: 156 MMHG

## 2024-03-18 DIAGNOSIS — I71.40 ABDOMINAL AORTIC ANEURYSM (AAA) 3.0 CM TO 5.0 CM IN DIAMETER IN FEMALE (H): Primary | ICD-10-CM

## 2024-03-18 PROCEDURE — G0463 HOSPITAL OUTPT CLINIC VISIT: HCPCS | Performed by: PHYSICIAN ASSISTANT

## 2024-03-18 PROCEDURE — 99214 OFFICE O/P EST MOD 30 MIN: CPT | Performed by: PHYSICIAN ASSISTANT

## 2024-03-18 ASSESSMENT — PAIN SCALES - GENERAL: PAINLEVEL: NO PAIN (0)

## 2024-03-18 NOTE — PROGRESS NOTES
"    VASCULAR SURGERY PROGRESS NOTE    LOCATION:  [facility]     Rosemarie Fry  Medical Record #: [id number]  YOB: 1934  Age: 89 year old     Date of Service: 3/18/2024    PRIMARY CARE PROVIDER: Sade Marquez    Reason for visit:  Surveillance of abdominal aortic aneurysm    IMPRESSION:  89 YO female presenting for surveillance of known abdominal aortic aneurysm. Recent CT demonstrating no change in the small rightward directed saccular aneurysm involving distal abdominal aorta. Patient remains asymptomatic and medically optimized.     RECOMMENDATION/RISKS: Continue best medical management and good blood pressure control. Encouraged ongoing workup with PCP regarding her dizziness. Follow up in 1 year with repeat CT scan for aneurysm surveillance.     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 Patient was last seen in the vascular clinic 1 year ago and was noted to be doing well from a AAA standpoint but was complaining of dizziness.     Today, Mrs. Fry presents for follow up. She is accompanied by her daughter. Patient denies any abdominal pain or back pain. She continues to experience episodes of dizziness.  It sounds like she has undergone extensive workup for this but without any known cause. She notes the dizziness is very debilitating, and it can take 2-3 days to fully recover after she experiences an episode. Mrs. Fry notes her dizzy episodes all started after a sinus infection years ago. She feels if she were to take a course of antibiotics they would resolve. She actually jokes about going to [facility] to get these because \"no one here will prescribe them to me\". Patient does note her symptoms slightly improve with the use of Tylenol. Symptoms are not " always related to position changes. Blood pressure today is actually slightly elevated with systolic in the 150s, but they note she has been having lower readings at home. Compliant with medications and not smoking.    Imaging results were discussed and all questions answered. No other concerns.     REVIEW OF SYSTEMS:    A 12 point ROS was reviewed and is negative except for what is listed above in HPI.    PHH:    Past Medical History:   Diagnosis Date    Allergic rhinitis due to animal dander     Atrial fibrillation (H)     Xarelto    Cataract, mild-mod, ou 5/17/2015    Diagnostic skin and sensitization tests (aka ALLERGENS)     Skin test 5/5/10 pos. for:  cat/dog/T/RW    Ex-smoker     Lyme disease 1999     Giancarlo    Osteoporosis     Seasonal allergic rhinitis skin test 5/5/10 pos. for:  cat/dog/T/RW     Past Surgical History:   Procedure Laterality Date    HYSTERECTOMY      LAPAROSCOPIC ASSISTED HYSTERECTOMY VAGINAL      ZZC APPENDECTOMY       ALLERGIES:  Sulfa antibiotics    MEDS:    Current Outpatient Medications:     acetaminophen (TYLENOL) 325 MG tablet, Take 325 mg by mouth every 6 hours as needed for mild pain, Disp: , Rfl:     fluocinolone acetonide oil 0.01 % ear drops, Place 0.25 mLs (5 drops) into both ears 2 times daily as needed (ear itching), Disp: 20 mL, Rfl: 1    ipratropium (ATROVENT) 0.06 % nasal spray, Spray 2 sprays into both nostrils 4 times daily as needed for rhinitis (runny nose), Disp: 15 mL, Rfl: 3    levothyroxine (SYNTHROID/LEVOTHROID) 50 MCG tablet, TAKE 1 TABLET(50 MCG) BY MOUTH DAILY, Disp: 90 tablet, Rfl: 1    meclizine (ANTIVERT) 25 MG tablet, Take 1 tablet (25 mg) by mouth daily as needed for dizziness, Disp: 30 tablet, Rfl: 3    metoprolol succinate ER (TOPROL XL) 50 MG 24 hr tablet, Take 1 tablet (50 mg) by mouth daily Note dose change of tabs. Only take 1 tab, Disp: 90 tablet, Rfl: 0    rivaroxaban ANTICOAGULANT (XARELTO) 20 MG TABS tablet, Take 20 mg by mouth every morning,  Disp: , Rfl:     triamcinolone (KENALOG) 0.1 % external cream, Apply topically 2 times daily Apply to outer ear, Disp: 15 g, Rfl: 2    calcium-vitamin D-vitamin K (VIACTIV) 500-500-40 MG-UNT-MCG CHEW, Take 1 tablet by mouth daily, Disp: , Rfl:     nitroGLYcerin (NITROSTAT) 0.4 MG sublingual tablet, Place 1 tablet (0.4 mg) under the tongue every 5 minutes as needed for chest pain If you are still having symptoms after 3 doses (15 minutes) call 911. (Patient not taking: Reported on 3/18/2024), Disp: 25 tablet, Rfl: 0    vitamin B-2 (RIBOFLAVIN) 25 MG TABS tablet, Take 1 tablet by mouth daily (Patient not taking: Reported on 3/18/2024), Disp: , Rfl:     SOCIAL HABITS:    History   Smoking Status    Former    Packs/day: 1.00    Years: 10.00    Types: Cigarettes    Quit date: 4/12/2007   Smokeless Tobacco    Never     Social History    Substance and Sexual Activity      Alcohol use: No        Alcohol/week: 0.0 standard drinks of alcohol      History   Drug Use No     FAMILY HISTORY:    Family History   Problem Relation Age of Onset    Diabetes Brother     Hypertension Brother     Asthma Daughter     Cancer No family hx of     Cerebrovascular Disease No family hx of     Thyroid Disease No family hx of     Glaucoma No family hx of     Macular Degeneration No family hx of      PE:  BP (!) 156/90   Pulse 70   Temp 97.3  F (36.3  C)   Resp 18   Wt Readings from Last 1 Encounters:   01/24/24 160 lb 4.8 oz (72.7 kg)     There is no height or weight on file to calculate BMI.    EXAM:  GENERAL: well-developed 89 year old female who appears her stated age  CARDIAC: normal   CHEST/LUNG: normal respiratory effort   MUSCULOSKELETAL: grossly normal and both lower extremities are intact, no lower extremity edema  NEUROLOGIC: focally intact, alert and oriented x 3  PSYCH: appropriate affect    DIAGNOSTIC STUDIES:     Images:  CT Abdomen Pelvis w/o Contrast    Result Date: 3/14/2024  INDICATION:  Abdominal aortic aneurysm (AAA) 3.0 cm  to 5.0 cm in diameter in female     IMPRESSION:   1.  No change in the small rightward directed saccular aneurysm involving distal abdominal aorta.   2.  No change in the simple appearing 3.8 cm left adnexal cyst.     LABS:      Sodium   Date Value Ref Range Status   07/06/2022 139 133 - 144 mmol/L Final   04/23/2022 140 136 - 145 mmol/L Final   06/29/2020 141 133 - 144 mmol/L Final   11/26/2018 141 133 - 144 mmol/L Final   05/21/2018 140 133 - 144 mmol/L Final     Urea Nitrogen   Date Value Ref Range Status   07/06/2022 39 (H) 7 - 30 mg/dL Final   04/23/2022 23 8 - 28 mg/dL Final   06/29/2020 24 7 - 30 mg/dL Final   11/26/2018 26 7 - 30 mg/dL Final   05/21/2018 30 7 - 30 mg/dL Final     Hemoglobin   Date Value Ref Range Status   07/06/2022 12.3 11.7 - 15.7 g/dL Final   04/23/2022 12.9 11.7 - 15.7 g/dL Final   06/29/2020 12.3 11.7 - 15.7 g/dL Final   05/01/2019 12.7 11.7 - 15.7 g/dL Final   11/26/2018 12.5 11.7 - 15.7 g/dL Final     Platelet Count   Date Value Ref Range Status   07/06/2022 249 150 - 450 10e3/uL Final   04/23/2022 226 150 - 450 10e3/uL Final   06/29/2020 231 150 - 450 10e9/L Final   05/01/2019 249 150 - 450 10e9/L Final   05/21/2018 242 150 - 450 10e9/L Final     BNP   Date Value Ref Range Status   04/23/2022 818 (H) 0 - 167 pg/mL Final     30 minutes spent on the day of encounter doing chart review, history and exam, documentation, and further activities as noted.     Yue Salter PA-C  VASCULAR SURGERY

## 2024-04-16 ENCOUNTER — PRE VISIT (OUTPATIENT)
Dept: NEUROSURGERY | Facility: CLINIC | Age: 89
End: 2024-04-16

## 2024-04-16 ENCOUNTER — OFFICE VISIT (OUTPATIENT)
Dept: NEUROSURGERY | Facility: CLINIC | Age: 89
End: 2024-04-16
Attending: INTERNAL MEDICINE
Payer: COMMERCIAL

## 2024-04-16 VITALS
DIASTOLIC BLOOD PRESSURE: 87 MMHG | RESPIRATION RATE: 16 BRPM | SYSTOLIC BLOOD PRESSURE: 173 MMHG | WEIGHT: 164 LBS | HEART RATE: 53 BPM | BODY MASS INDEX: 29.24 KG/M2 | OXYGEN SATURATION: 97 %

## 2024-04-16 DIAGNOSIS — I72.9 ANEURYSM (H): ICD-10-CM

## 2024-04-16 DIAGNOSIS — E06.3 HYPOTHYROIDISM DUE TO HASHIMOTO'S THYROIDITIS: ICD-10-CM

## 2024-04-16 PROCEDURE — 99203 OFFICE O/P NEW LOW 30 MIN: CPT | Mod: GC | Performed by: NEUROLOGICAL SURGERY

## 2024-04-16 RX ORDER — LEVOTHYROXINE SODIUM 50 UG/1
TABLET ORAL
Qty: 90 TABLET | Refills: 0 | Status: SHIPPED | OUTPATIENT
Start: 2024-04-16

## 2024-04-16 ASSESSMENT — PAIN SCALES - GENERAL: PAINLEVEL: NO PAIN (0)

## 2024-04-16 NOTE — TELEPHONE ENCOUNTER
Requested Prescriptions   Pending Prescriptions Disp Refills    levothyroxine (SYNTHROID/LEVOTHROID) 50 MCG tablet [Pharmacy Med Name: LEVOTHYROXINE 0.05MG (50MCG) TAB] 90 tablet 1     Sig: TAKE 1 TABLET(50 MCG) BY MOUTH DAILY       Thyroid Protocol Failed - 4/16/2024  2:09 PM        Failed - Normal TSH on file in past 12 months     Recent Labs   Lab Test 05/25/23  1710   TSH 4.99*              Passed - Patient is 12 years or older        Passed - Recent (12 mo) or future (30 days) visit within the authorizing provider's specialty     The patient must have completed an in-person or virtual visit within the past 12 months or has a future visit scheduled within the next 90 days with the authorizing provider s specialty.  Urgent care and e-visits do not quality as an office visit for this protocol.          Passed - Medication is active on med list        Passed - Medication indicated for associated diagnosis     Medication is associated with one or more of the following diagnoses:  Hypothyroidism  Thyroid stimulating hormone suppression therapy  Thyroid cancer          Passed - No active pregnancy on record     If patient is pregnant or has had a positive pregnancy test, please check TSH.          Passed - No positive pregnancy test in past 12 months     If patient is pregnant or has had a positive pregnancy test, please check TSH.

## 2024-04-16 NOTE — LETTER
4/16/2024       RE: Rosemarie Fry  [address] [place] [address]       Dear Colleague,    Thank you for referring your patient, Rosemarie Fry, to the [facility] [place] at [facility]. Please see a copy of my visit note below.          [facility] [place]  [address]  3RD FLOOR  [facility] [address]  Phone: [phone]  Fax: [phone]    Neuroendovascular Clinic Note:    Reason for clinic visit: Suspected left MCA bifurcation aneurysm      History of present illness:   89 year old woman, medical history of HTN, aortic aneurysm. She has been having headaches and dizziness for the past few months. 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 She is complaining of dizziness upon standing up and moving. She lives by herself and is able to perform all ADLs. She is accompanied by her daughter in clinic today.      Past Medical History:  Past Medical History:   Diagnosis Date    Allergic rhinitis due to animal dander     Atrial fibrillation (H)     Xarelto    Cataract, mild-mod, ou 5/17/2015    Diagnostic skin and sensitization tests (aka ALLERGENS)     Skin test 5/5/10 pos. for:  cat/dog/T/RW    Ex-smoker     Lyme disease 1999     Giancarlo    Osteoporosis     Seasonal allergic rhinitis skin test 5/5/10 pos. for:  cat/dog/T/RW       Past Surgical History:  Past Surgical History:   Procedure Laterality Date    HYSTERECTOMY      LAPAROSCOPIC ASSISTED HYSTERECTOMY VAGINAL      ZZC APPENDECTOMY         Social History:  Social History     Socioeconomic History    Marital status:      Spouse name: Not on file    Number of children: 5    Years of education: 8    Highest education level: Not on file   Occupational History    Occupation: [employer]     Employer: [employer]     Comment: 15 years   Tobacco Use     Smoking status: Former     Current packs/day: 0.00     Average packs/day: 1 pack/day for 10.0 years (10.0 ttl pk-yrs)     Types: Cigarettes     Start date: 1997     Quit date: 2007     Years since quittin.0    Smokeless tobacco: Never   Vaping Use    Vaping status: Never Used   Substance and Sexual Activity    Alcohol use: No     Alcohol/week: 0.0 standard drinks of alcohol    Drug use: No    Sexual activity: Never     Birth control/protection: Surgical     Comment: PARTIAL HYST   Other Topics Concern    Parent/sibling w/ CABG, MI or angioplasty before 65F 55M? Not Asked   Social History Narrative    Not on file     Social Determinants of Health     Financial Resource Strain: Low Risk  (2024)    Financial Resource Strain     Within the past 12 months, have you or your family members you live with been unable to get utilities (heat, electricity) when it was really needed?: No   Food Insecurity: Low Risk  (2024)    Food Insecurity     Within the past 12 months, did you worry that your food would run out before you got money to buy more?: No     Within the past 12 months, did the food you bought just not last and you didn t have money to get more?: No   Transportation Needs: Low Risk  (2024)    Transportation Needs     Within the past 12 months, has lack of transportation kept you from medical appointments, getting your medicines, non-medical meetings or appointments, work, or from getting things that you need?: No   Physical Activity: Not on file   Stress: Not on file   Social Connections: Socially Integrated (2024)    Received from [facility], [facility]    Social Connections     Frequency of Communication with Friends and Family: 0   Interpersonal Safety: Not on file   Housing Stability: Low Risk  (2024)    Housing Stability     Do you have housing? : Yes     Are you worried about losing your housing?: No        Family History:  Family History   Problem Relation Age of Onset    Diabetes Brother     Hypertension Brother     Asthma Daughter     Cancer No family hx of     Cerebrovascular Disease No family hx of     Thyroid Disease No family hx of     Glaucoma No family hx of     Macular Degeneration No family hx of        Home Medications:  Current Outpatient Medications   Medication Sig Dispense Refill    acetaminophen (TYLENOL) 325 MG tablet Take 325 mg by mouth every 6 hours as needed for mild pain      calcium-vitamin D-vitamin K (VIACTIV) 500-500-40 MG-UNT-MCG CHEW Take 1 tablet by mouth daily      levothyroxine (SYNTHROID/LEVOTHROID) 50 MCG tablet TAKE 1 TABLET(50 MCG) BY MOUTH DAILY 90 tablet 1    meclizine (ANTIVERT) 25 MG tablet Take 1 tablet (25 mg) by mouth daily as needed for dizziness 30 tablet 3    metoprolol succinate ER (TOPROL XL) 50 MG 24 hr tablet Take 1 tablet (50 mg) by mouth daily Note dose change of tabs. Only take 1 tab 90 tablet 0    nitroGLYcerin (NITROSTAT) 0.4 MG sublingual tablet Place 1 tablet (0.4 mg) under the tongue every 5 minutes as needed for chest pain If you are still having symptoms after 3 doses (15 minutes) call 911. 25 tablet 0    rivaroxaban ANTICOAGULANT (XARELTO) 20 MG TABS tablet Take 20 mg by mouth every morning      triamcinolone (KENALOG) 0.1 % external cream Apply topically 2 times daily Apply to outer ear 15 g 2    fluocinolone acetonide oil 0.01 % ear drops Place 0.25 mLs (5 drops) into both ears 2 times daily as needed (ear itching) (Patient not taking: Reported on 4/16/2024) 20 mL 1    ipratropium (ATROVENT) 0.06 % nasal spray Spray 2 sprays into both nostrils 4 times daily as needed for rhinitis (runny nose) (Patient not taking: Reported on 4/16/2024) 15 mL 3    vitamin B-2 (RIBOFLAVIN) 25 MG TABS tablet Take 1 tablet by mouth daily (Patient not taking: Reported on 3/18/2024)       No current facility-administered medications for this visit.        Allergies:  Allergies   Allergen Reactions    Sulfa Antibiotics Rash         Laboratory findings:  Reviewed    Imaging findings:    MRI brain                                                              IMPRESSION:    1. Normal MR appearance of the internal auditory canals, labyrinthine  structures, as well as the intracranial course of the seventh and  eighth cranial nerves.   2. No acute infarct, mass, or herniation.  3. Superficial siderosis along the cerebellum. This was also present  on MR 9/8/2016.    Impression:  Suspected left MCA bifurcation aneurysm discovered in November 2023      Plan:  CTA head and neck soon and then see back for virtual clinic visit    Patient seen and discussed with the attending, Dr. Seven Barclay MD   Neuroendovascular Surgical Neuroradiology Fellow  Pager: [phone]      Again, thank you for allowing me to participate in the care of your patient.      Sincerely,    Colt Amezcua MD

## 2024-04-16 NOTE — PROGRESS NOTES
[facility] NEUROSURGERY CLINIC [address] [address]  Phone: [phone]  Fax: [phone]    Neuroendovascular Clinic Note:    Reason for clinic visit: Suspected left MCA bifurcation aneurysm      History of present illness:   89 year old woman, medical history of HTN, aortic aneurysm. She has been having headaches and dizziness for the past few months. She was discovered to have a left MCA bifurcation aneurysm in 2022 but has not had follow up imaging for it afterwards. She is complaining of dizziness upon standing up and moving. She lives by herself and is able to perform all ADLs. She is accompanied by her daughter in clinic today.      Past Medical History:  Past Medical History:   Diagnosis Date    Allergic rhinitis due to animal dander     Atrial fibrillation (H)     Xarelto    Cataract, mild-mod, ou 2015    Diagnostic skin and sensitization tests (aka ALLERGENS)     Skin test 5/5/10 pos. for:  cat/dog/T/RW    Ex-smoker     Lyme disease      Giancarlo    Osteoporosis     Seasonal allergic rhinitis skin test 5/5/10 pos. for:  cat/dog/T/RW       Past Surgical History:  Past Surgical History:   Procedure Laterality Date    HYSTERECTOMY      LAPAROSCOPIC ASSISTED HYSTERECTOMY VAGINAL      ZZC APPENDECTOMY         Social History:  Social History     Socioeconomic History    Marital status:      Spouse name: Not on file    Number of children: 5    Years of education: 8    Highest education level: Not on file   Occupational History    Occupation: [employer]     Employer: [employer]     Comment: 15 years   Tobacco Use    Smoking status: Former     Current packs/day: 0.00     Average packs/day: 1 pack/day for 10.0 years (10.0 ttl pk-yrs)     Types: Cigarettes     Start date: 1997     Quit date: 2007     Years since quittin.0    Smokeless tobacco: Never   Vaping Use    Vaping status: Never Used   Substance and Sexual  Activity    Alcohol use: No     Alcohol/week: 0.0 standard drinks of alcohol    Drug use: No    Sexual activity: Never     Birth control/protection: Surgical     Comment: PARTIAL HYST   Other Topics Concern    Parent/sibling w/ CABG, MI or angioplasty before 65F 55M? Not Asked   Social History Narrative    Not on file     Social Determinants of Health     Financial Resource Strain: Low Risk  (1/24/2024)    Financial Resource Strain     Within the past 12 months, have you or your family members you live with been unable to get utilities (heat, electricity) when it was really needed?: No   Food Insecurity: Low Risk  (1/24/2024)    Food Insecurity     Within the past 12 months, did you worry that your food would run out before you got money to buy more?: No     Within the past 12 months, did the food you bought just not last and you didn t have money to get more?: No   Transportation Needs: Low Risk  (1/24/2024)    Transportation Needs     Within the past 12 months, has lack of transportation kept you from medical appointments, getting your medicines, non-medical meetings or appointments, work, or from getting things that you need?: No   Physical Activity: Not on file   Stress: Not on file   Social Connections: Socially Integrated (1/9/2024)    Received from [facility], [facility]    Social Connections     Frequency of Communication with Friends and Family: 0   Interpersonal Safety: Not on file   Housing Stability: Low Risk  (1/24/2024)    Housing Stability     Do you have housing? : Yes     Are you worried about losing your housing?: No       Family History:  Family History   Problem Relation Age of Onset    Diabetes Brother     Hypertension Brother     Asthma Daughter     Cancer No family hx of     Cerebrovascular Disease No family hx of     Thyroid Disease No family hx of     Glaucoma No family hx of     Macular Degeneration No family hx of         Home Medications:  Current Outpatient Medications   Medication Sig Dispense Refill    acetaminophen (TYLENOL) 325 MG tablet Take 325 mg by mouth every 6 hours as needed for mild pain      calcium-vitamin D-vitamin K (VIACTIV) 500-500-40 MG-UNT-MCG CHEW Take 1 tablet by mouth daily      levothyroxine (SYNTHROID/LEVOTHROID) 50 MCG tablet TAKE 1 TABLET(50 MCG) BY MOUTH DAILY 90 tablet 1    meclizine (ANTIVERT) 25 MG tablet Take 1 tablet (25 mg) by mouth daily as needed for dizziness 30 tablet 3    metoprolol succinate ER (TOPROL XL) 50 MG 24 hr tablet Take 1 tablet (50 mg) by mouth daily Note dose change of tabs. Only take 1 tab 90 tablet 0    nitroGLYcerin (NITROSTAT) 0.4 MG sublingual tablet Place 1 tablet (0.4 mg) under the tongue every 5 minutes as needed for chest pain If you are still having symptoms after 3 doses (15 minutes) call 911. 25 tablet 0    rivaroxaban ANTICOAGULANT (XARELTO) 20 MG TABS tablet Take 20 mg by mouth every morning      triamcinolone (KENALOG) 0.1 % external cream Apply topically 2 times daily Apply to outer ear 15 g 2    fluocinolone acetonide oil 0.01 % ear drops Place 0.25 mLs (5 drops) into both ears 2 times daily as needed (ear itching) (Patient not taking: Reported on 4/16/2024) 20 mL 1    ipratropium (ATROVENT) 0.06 % nasal spray Spray 2 sprays into both nostrils 4 times daily as needed for rhinitis (runny nose) (Patient not taking: Reported on 4/16/2024) 15 mL 3    vitamin B-2 (RIBOFLAVIN) 25 MG TABS tablet Take 1 tablet by mouth daily (Patient not taking: Reported on 3/18/2024)       No current facility-administered medications for this visit.       Allergies:  Allergies   Allergen Reactions    Sulfa Antibiotics Rash         Laboratory findings:  Reviewed    Imaging findings:    MRI brain                                                              IMPRESSION:    1. Normal MR appearance of the internal auditory canals, labyrinthine  structures, as well as the intracranial  course of the seventh and  eighth cranial nerves.   2. No acute infarct, mass, or herniation.  3. Superficial siderosis along the cerebellum. This was also present  on MR 9/8/2016.    Impression:  Suspected left MCA bifurcation aneurysm discovered in November 2023      Plan:  CTA head and neck soon and then see back for virtual clinic visit      Patient seen and discussed with the attending, Dr. Seven Barclay MD   Neuroendovascular Surgical Neuroradiology Fellow  Pager: [phone]

## 2024-04-17 NOTE — PATIENT INSTRUCTIONS
Follow-up with Dr. Amezcua in 2-4 weeks with a CTA head/neck prior to your appointment.     Stroke & Endovascular RN Care Coordinators:    Nohemy Ordaz, RN, BSN  Twila Clifton, RN, CNRN, SCRN    If you have any questions please contact the RN Care Coordinators at [phone], option 1.     After business hours call the  at [phone] and have the Neuro-Interventional Fellow paged.    Thank you for choosing [facility] for your health care needs.

## 2024-04-18 ENCOUNTER — TELEPHONE (OUTPATIENT)
Dept: NEUROSURGERY | Facility: CLINIC | Age: 89
End: 2024-04-18
Payer: COMMERCIAL

## 2024-04-18 NOTE — TELEPHONE ENCOUNTER
Left Voicemail (1st Attempt) and Sent Mychart (1st Attempt) for the patient to call back and schedule the following:    Appointment type: return vascular neurosurg  Provider:   Return date: May 2024  Specialty phone number: [phone]  Additional appointment(s) needed:   -CT prior to visit   Additonal Notes: WQ- appointment request         Keiry Allen on 4/18/2024 at 11:38 AM

## 2024-04-20 ENCOUNTER — MYC REFILL (OUTPATIENT)
Dept: FAMILY MEDICINE | Facility: CLINIC | Age: 89
End: 2024-04-20
Payer: COMMERCIAL

## 2024-04-20 DIAGNOSIS — I48.20 CHRONIC ATRIAL FIBRILLATION (H): ICD-10-CM

## 2024-04-22 DIAGNOSIS — I48.20 CHRONIC ATRIAL FIBRILLATION (H): ICD-10-CM

## 2024-04-22 RX ORDER — METOPROLOL SUCCINATE 50 MG/1
50 TABLET, EXTENDED RELEASE ORAL DAILY
Qty: 90 TABLET | Refills: 0 | OUTPATIENT
Start: 2024-04-22

## 2024-04-22 NOTE — TELEPHONE ENCOUNTER
Refill declined - cardiology manages. Also doesn't appear to have filled since 3/2023 for 3month supply?

## 2024-04-22 NOTE — TELEPHONE ENCOUNTER
Called and talked to daughter, Janis.  She says the pharmacy sent this to us by mistake.    Kelsie Luo on 4/22/2024 at 2:42 PM

## 2024-04-23 ENCOUNTER — TELEPHONE (OUTPATIENT)
Dept: ANTICOAGULATION | Facility: CLINIC | Age: 89
End: 2024-04-23
Payer: COMMERCIAL

## 2024-04-23 DIAGNOSIS — I48.20 CHRONIC ATRIAL FIBRILLATION (H): ICD-10-CM

## 2024-04-23 NOTE — TELEPHONE ENCOUNTER
ANTICOAGULATION DIRECT ORAL ANTICOAGULANT MONITORING    SUBJECTIVE     The [facility] is evaluating Rosemarie Fry's Rivaroxaban (Xarelto) as part of its Anticoagulation Monitoring Program.    Indication:Atrial Fibrillation  Current dose per medication list: Rivaroxaban 20 mg Daily  Recent hospitalizations/ED/Office Visits for bleeding/clotting concerns: No  Other bleeding or side effect concerns: No  Additional findings: AAA 3.0-5.0 cm, CKD 3a      OBJECTIVE     Age: 89 year old    Wt Readings from Last 2 Encounters:   04/16/24 74.4 kg (164 lb)   01/24/24 72.7 kg (160 lb 4.8 oz)      Lab Results   Component Value Date    CR 1.2 (H) 02/13/2023    CR 1.2 (H) 11/03/2022    CR 1.14 (H) 07/06/2022     Creatinine Clearance (using actual bodyweight, mL/min): 37    Lab Results   Component Value Date    HGB 12.3 07/06/2022    HGB 12.3 06/29/2020     07/06/2022     06/29/2020     ASSESSMENT/PLAN     A chart review for Direct Oral Anticoagulant (DOAC) Stewardship has been completed for:     Dosing: recommend adjustment to Rivaroxaban 15 mg Daily for CrCl <= 50 mL/min (using actual bodyweight) (consistent with package insert dosing)    Plan made per [facility] anticoagulation protocol    Peter Sinclair RN  Anticoagulation Clinic

## 2024-04-24 ENCOUNTER — TELEPHONE (OUTPATIENT)
Dept: CARDIOLOGY | Facility: CLINIC | Age: 89
End: 2024-04-24
Payer: COMMERCIAL

## 2024-04-24 NOTE — TELEPHONE ENCOUNTER
Medication Question or Refill    Contacts         Type Contact Phone/Fax    04/24/2024 05:12 PM CDT Phone (Incoming) Yesi Nino (Emergency Contact)             What medication are you calling about (include dose and sig)?: rivaroxaban ANTICOAGULANT (XARELTO) 15 MG TABS tablet     Preferred Pharmacy:   [id number] [address] [facility]  [address] [place] [address]  Phone: [phone] Fax: [phone]      Controlled Substance Agreement on file:   CSA -- Patient Level:    CSA: None found at the patient level.       Who prescribed the medication?: Dulce Gramajo MD    Do you need a refill? N/A    When did you use the medication last? N/A    Patient offered an appointment? No    Do you have any questions or concerns?  Yes: pt's daughter calling to discuss dosage adjustment       Could we send this information to you in [facility] or would you prefer to receive a phone call?:   Patient would prefer a phone call   Okay to leave a detailed message?: Yes at Other phone number:  [phone]

## 2024-04-24 NOTE — TELEPHONE ENCOUNTER
Spoke with daughter Janis and reviewed plan to continue xarelto  at a lesser dose. New rx sent to pharm

## 2024-04-24 NOTE — TELEPHONE ENCOUNTER
Medication Question or Refill    Contacts         Type Contact Phone/Fax    04/24/2024 05:29 PM CDT Phone (Incoming) Yesi Nino (Emergency Contact) [phone] (H)            What medication are you calling about (include dose and sig)?: rivaroxaban ANTICOAGULANT (XARELTO) 15 MG TABS tablet     Preferred Pharmacy:   [id number] [address] [facility] [address] [address] [address]  Phone: [phone] Fax: [phone]      Controlled Substance Agreement on file:   CSA -- Patient Level:    CSA: None found at the patient level.       Who prescribed the medication?: N/A    Do you need a refill? N/A    When did you use the medication last? N/A    Patient offered an appointment? No    Do you have any questions or concerns?  Yes: Yes: pt's daughter calling to discuss dosage adjustment       Could we send this information to you in [facility] or would you prefer to receive a phone call?:   Patient would prefer a phone call   Okay to leave a detailed message?: Yes at Other phone number:  [phone]

## 2024-04-29 ENCOUNTER — OFFICE VISIT (OUTPATIENT)
Dept: CARDIOLOGY | Facility: CLINIC | Age: 89
End: 2024-04-29
Payer: COMMERCIAL

## 2024-04-29 VITALS
RESPIRATION RATE: 16 BRPM | HEART RATE: 79 BPM | BODY MASS INDEX: 29.24 KG/M2 | DIASTOLIC BLOOD PRESSURE: 78 MMHG | WEIGHT: 164 LBS | SYSTOLIC BLOOD PRESSURE: 167 MMHG

## 2024-04-29 DIAGNOSIS — E06.3 HYPOTHYROIDISM DUE TO HASHIMOTO'S THYROIDITIS: ICD-10-CM

## 2024-04-29 DIAGNOSIS — I71.43 INFRARENAL ABDOMINAL AORTIC ANEURYSM (AAA) WITHOUT RUPTURE (H): Primary | ICD-10-CM

## 2024-04-29 DIAGNOSIS — R03.0 ELEVATED BLOOD PRESSURE READING WITHOUT DIAGNOSIS OF HYPERTENSION: ICD-10-CM

## 2024-04-29 DIAGNOSIS — I48.20 CHRONIC ATRIAL FIBRILLATION (H): ICD-10-CM

## 2024-04-29 LAB
ANION GAP SERPL CALCULATED.3IONS-SCNC: 13 MMOL/L (ref 7–15)
BUN SERPL-MCNC: 23.6 MG/DL (ref 8–23)
CALCIUM SERPL-MCNC: 9.9 MG/DL (ref 8.8–10.2)
CHLORIDE SERPL-SCNC: 101 MMOL/L (ref 98–107)
CREAT SERPL-MCNC: 0.95 MG/DL (ref 0.51–0.95)
DEPRECATED HCO3 PLAS-SCNC: 25 MMOL/L (ref 22–29)
EGFRCR SERPLBLD CKD-EPI 2021: 57 ML/MIN/1.73M2
GLUCOSE SERPL-MCNC: 82 MG/DL (ref 70–99)
POTASSIUM SERPL-SCNC: 4.8 MMOL/L (ref 3.4–5.3)
SODIUM SERPL-SCNC: 139 MMOL/L (ref 135–145)
T4 FREE SERPL-MCNC: 1.06 NG/DL (ref 0.9–1.7)
TSH SERPL DL<=0.005 MIU/L-ACNC: 4.5 UIU/ML (ref 0.3–4.2)

## 2024-04-29 PROCEDURE — 36415 COLL VENOUS BLD VENIPUNCTURE: CPT | Performed by: INTERNAL MEDICINE

## 2024-04-29 PROCEDURE — 99214 OFFICE O/P EST MOD 30 MIN: CPT | Performed by: INTERNAL MEDICINE

## 2024-04-29 PROCEDURE — 84439 ASSAY OF FREE THYROXINE: CPT | Performed by: INTERNAL MEDICINE

## 2024-04-29 PROCEDURE — 80048 BASIC METABOLIC PNL TOTAL CA: CPT | Performed by: INTERNAL MEDICINE

## 2024-04-29 PROCEDURE — 84443 ASSAY THYROID STIM HORMONE: CPT | Performed by: INTERNAL MEDICINE

## 2024-04-29 RX ORDER — AMLODIPINE BESYLATE 2.5 MG/1
2.5 TABLET ORAL DAILY
Qty: 90 TABLET | Refills: 2 | Status: SHIPPED | OUTPATIENT
Start: 2024-04-29

## 2024-04-29 RX ORDER — AMLODIPINE BESYLATE 2.5 MG/1
2.5 TABLET ORAL DAILY
Qty: 30 TABLET | Refills: 3 | Status: SHIPPED | OUTPATIENT
Start: 2024-04-29 | End: 2024-04-29

## 2024-04-29 NOTE — LETTER
May 2, 2024      Rosemarie Fry  [address] [place] [address]        Dear ,    We are writing to inform you of your test results.    TSH is normal when adjusted for age     Resulted Orders   TSH with free T4 reflex   Result Value Ref Range    TSH 4.50 (H) 0.30 - 4.20 uIU/mL   T4 free   Result Value Ref Range    Free T4 1.06 0.90 - 1.70 ng/dL       If you have any questions or concerns, please call the clinic at the number listed above.       Sincerely,      Sade Marquez, DO

## 2024-04-29 NOTE — PROGRESS NOTES
Thank you, Dr. Sade Marquez, for asking the [facility] team to see Ms. Rosemarie Fry to follow-up on abdominal aortic aneurysm, elevated blood pressure.    Assessment/Recommendations   Assessment:    1.  Abdominal aortic aneurysm, measuring 3.9 cm at maximal diameter.  This is unchanged from her prior study 1 year ago.  At this point continued surveillance is indicated.  2.  Elevated blood pressure without prior diagnosis of hypertension.  Her blood pressure has been elevated on 2  readings prior to today and on both measurements today.  At this point not clear whether this is contributing to her complaints of headache and dizziness.  It clearly is not beneficial in the setting of an abdominal aneurysm.  Recommended initiation of amlodipine 2.5 mg daily to help with blood pressure control.  I did recommend checking basic metabolic profile as she has not had 1 done in a couple of years.  3.  Permanent atrial fibrillation, currently treated with metoprolol for rate control and Xarelto anticoagulation.      Plan:  1.  Continue current medications  2.  Check basic metabolic profile today  3.  Begin amlodipine 2.5 mg daily to help with blood pressure control  4.  Follow-up in 1 year       History of Present Illness    Ms. Rosemarie Fry is a 89 year old female with history of infrarenal abdominal aortic aneurysm, previously seen by my former colleague Dr. Plascencia, who presents today for follow-up visit.  Her blood pressure was quite elevated at the time of rooming and remained elevated at the time of my visit at 152/80.  I note that her blood pressures have been elevated to other previous visits in the last 2 months.  Her biggest complaint today is that of ongoing dizziness which her daughter states has been present for a year as well as some mild discomfort across to her forehead.  She believes this is due to a sinus infection and wonders if she could have an antibiotic.  I told her this  would be best evaluated by her primary care provider.  She denies any current complaints of chest discomfort, shortness of breath or abdominal discomfort.    ECG (personally reviewed): No ECG today    Cardiac Imaging Studies (personally reviewed): No recent imaging     Physical Examination Review of Systems   BP (!) 167/78 (BP Location: Right arm, Patient Position: Sitting, Cuff Size: Adult Large)   Pulse 79   Resp 16   Wt 74.4 kg (164 lb)   BMI 29.24 kg/m    Body mass index is 29.24 kg/m .  Wt Readings from Last 3 Encounters:   04/29/24 74.4 kg (164 lb)   04/16/24 74.4 kg (164 lb)   01/24/24 72.7 kg (160 lb 4.8 oz)     General Appearance:   Awake, Alert, No acute distress.   HEENT:  No scleral icterus; the mucous membranes were pink and moist.   Neck: No cervical bruits or jugular venous distention    Chest: The spine was straight. The chest was symmetric.   Lungs:   Respirations unlabored; the lungs are clear to auscultation. No wheezing   Cardiovascular:   Regular rate and rhythm.  S1, S2 normal.  No murmur or gallop   Abdomen:  No organomegaly, masses, bruits, or tenderness. Bowels sounds are present   Extremities: No peripheral edema bilaterally   Skin: No xanthelasma. Warm, Dry.   Musculoskeletal: No tenderness.   Neurologic: Mood and affect are appropriate.    Enc Vitals  BP: (!) 167/78  Pulse: 79  Resp: 16  Weight: 74.4 kg (164 lb)                                         Medical History  Surgical History Family History Social History   Past Medical History:   Diagnosis Date    Allergic rhinitis due to animal dander     Atrial fibrillation (H)     Xarelto    Cataract, mild-mod, ou 5/17/2015    Diagnostic skin and sensitization tests (aka ALLERGENS)     Skin test 5/5/10 pos. for:  cat/dog/T/RW    Ex-smoker     Lyme disease 1999     Giancarlo    Osteoporosis     Seasonal allergic rhinitis skin test 5/5/10 pos. for:  cat/dog/T/RW    Past Surgical History:   Procedure Laterality Date    HYSTERECTOMY       LAPAROSCOPIC ASSISTED HYSTERECTOMY VAGINAL      ZZC APPENDECTOMY      Family History   Problem Relation Age of Onset    Diabetes Brother     Hypertension Brother     Asthma Daughter     Cancer No family hx of     Cerebrovascular Disease No family hx of     Thyroid Disease No family hx of     Glaucoma No family hx of     Macular Degeneration No family hx of     Social History     Socioeconomic History    Marital status:      Spouse name: Not on file    Number of children: 5    Years of education: 8    Highest education level: Not on file   Occupational History    Occupation: [employer]     Employer: [employer]     Comment: 15 years   Tobacco Use    Smoking status: Former     Current packs/day: 0.00     Average packs/day: 1 pack/day for 10.0 years (10.0 ttl pk-yrs)     Types: Cigarettes     Start date: 1997     Quit date: 2007     Years since quittin.0    Smokeless tobacco: Never   Vaping Use    Vaping status: Never Used   Substance and Sexual Activity    Alcohol use: No     Alcohol/week: 0.0 standard drinks of alcohol    Drug use: No    Sexual activity: Never     Birth control/protection: Surgical     Comment: PARTIAL HYST   Other Topics Concern    Parent/sibling w/ CABG, MI or angioplasty before 65F 55M? Not Asked   Social History Narrative    Not on file     Social Determinants of Health     Financial Resource Strain: Low Risk  (2024)    Financial Resource Strain     Within the past 12 months, have you or your family members you live with been unable to get utilities (heat, electricity) when it was really needed?: No   Food Insecurity: Low Risk  (2024)    Food Insecurity     Within the past 12 months, did you worry that your food would run out before you got money to buy more?: No     Within the past 12 months, did the food you bought just not last and you didn t have money to get more?: No   Transportation Needs: Low Risk  (2024)    Transportation Needs     Within the past 12 months,  has lack of transportation kept you from medical appointments, getting your medicines, non-medical meetings or appointments, work, or from getting things that you need?: No   Physical Activity: Not on file   Stress: Not on file   Social Connections: Socially Integrated (1/9/2024)    [facility]    Social Connections     Frequency of Communication with Friends and Family: 0   Interpersonal Safety: Not on file   Housing Stability: Low Risk  (1/24/2024)    Housing Stability     Do you have housing? : Yes     Are you worried about losing your housing?: No          Medications  Allergies   Current Outpatient Medications   Medication Sig Dispense Refill    acetaminophen (TYLENOL) 325 MG tablet Take 325 mg by mouth every 6 hours as needed for mild pain      amLODIPine (NORVASC) 2.5 MG tablet Take 1 tablet (2.5 mg) by mouth daily 30 tablet 3    calcium-vitamin D-vitamin K (VIACTIV) 500-500-40 MG-UNT-MCG CHEW Take 1 tablet by mouth daily      levothyroxine (SYNTHROID/LEVOTHROID) 50 MCG tablet TAKE 1 TABLET(50 MCG) BY MOUTH DAILY 90 tablet 0    meclizine (ANTIVERT) 25 MG tablet Take 1 tablet (25 mg) by mouth daily as needed for dizziness 30 tablet 3    metoprolol succinate ER (TOPROL XL) 50 MG 24 hr tablet Take 1 tablet (50 mg) by mouth daily Note dose change of tabs. Only take 1 tab 90 tablet 0    nitroGLYcerin (NITROSTAT) 0.4 MG sublingual tablet Place 1 tablet (0.4 mg) under the tongue every 5 minutes as needed for chest pain If you are still having symptoms after 3 doses (15 minutes) call 911. 25 tablet 0    rivaroxaban ANTICOAGULANT (XARELTO) 15 MG TABS tablet Take 1 tablet (15 mg) by mouth every morning 90 tablet 3    triamcinolone (KENALOG) 0.1 % external cream Apply topically 2 times daily Apply to outer ear 15 g 2    vitamin B-2 (RIBOFLAVIN) 25 MG TABS tablet Take 1 tablet by mouth daily      fluocinolone acetonide oil 0.01 %  ear drops Place 0.25 mLs (5 drops) into both ears 2 times daily as needed (ear itching) (Patient not taking: Reported on 4/16/2024) 20 mL 1    ipratropium (ATROVENT) 0.06 % nasal spray Spray 2 sprays into both nostrils 4 times daily as needed for rhinitis (runny nose) (Patient not taking: Reported on 4/16/2024) 15 mL 3      Allergies   Allergen Reactions    Sulfa Antibiotics Rash         Lab Results    Chemistry/lipid CBC Cardiac Enzymes/BNP/TSH/INR   Recent Labs   Lab Test 04/24/23  1638 07/06/22  1658   TRIG 295*  --    LDL 85  --    BUN  --  39*   NA  --  139   CO2  --  27    Recent Labs   Lab Test 07/06/22  1658   WBC 6.3   HGB 12.3   HCT 37.6   *       Recent Labs   Lab Test 05/25/23  1710 04/23/22  1123   TROPONINI  --  <0.01   BNP  --  818*   TSH 4.99*  --         A total of 33 minutes was spent reviewing patient's medical records, obtaining history and performing examination, as well as discussing diagnoses/ recommendations with patient and answering all questions.

## 2024-04-29 NOTE — PATIENT INSTRUCTIONS
Draw blood work today to check electrolytes and kidney function  Begin amlodipine to help lower your blood pressure  Continue all other medications as is  Will review CT studies with radiology

## 2024-04-29 NOTE — LETTER
4/29/2024    Sade Marquez, DO  [address] [facility] [address]    RE: Rosemarie Fry       Dear Colleague,     I had the pleasure of seeing Rosemarie Fry in the [facility].      Thank you, Dr. Sade Marquez, for asking the [facility] team to see Ms. Rosemarie Fry to follow-up on abdominal aortic aneurysm, elevated blood pressure.    Assessment/Recommendations   Assessment:    1.  Abdominal aortic aneurysm, measuring 3.9 cm at maximal diameter.  This is unchanged from her prior study 1 year ago.  At this point continued surveillance is indicated.  2.  Elevated blood pressure without prior diagnosis of hypertension.  Her blood pressure has been elevated on 2  readings prior to today and on both measurements today.  At this point not clear whether this is contributing to her complaints of headache and dizziness.  It clearly is not beneficial in the setting of an abdominal aneurysm.  Recommended initiation of amlodipine 2.5 mg daily to help with blood pressure control.  I did recommend checking basic metabolic profile as she has not had 1 done in a couple of years.  3.  Permanent atrial fibrillation, currently treated with metoprolol for rate control and Xarelto anticoagulation.      Plan:  1.  Continue current medications  2.  Check basic metabolic profile today  3.  Begin amlodipine 2.5 mg daily to help with blood pressure control  4.  Follow-up in 1 year       History of Present Illness    Ms. Rosemarie Fry is a 89 year old female with history of infrarenal abdominal aortic aneurysm, previously seen by my former colleague Dr. Plascencia, who presents today for follow-up visit.  Her blood pressure was quite elevated at the time of rooming and remained elevated at the time of my visit at 152/80.  I note that her blood pressures have been elevated to other previous visits in the last 2 months.  Her biggest complaint today is that of ongoing dizziness which her  daughter states has been present for a year as well as some mild discomfort across to her forehead.  She believes this is due to a sinus infection and wonders if she could have an antibiotic.  I told her this would be best evaluated by her primary care provider.  She denies any current complaints of chest discomfort, shortness of breath or abdominal discomfort.    ECG (personally reviewed): No ECG today    Cardiac Imaging Studies (personally reviewed): No recent imaging     Physical Examination Review of Systems   BP (!) 167/78 (BP Location: Right arm, Patient Position: Sitting, Cuff Size: Adult Large)   Pulse 79   Resp 16   Wt 74.4 kg (164 lb)   BMI 29.24 kg/m    Body mass index is 29.24 kg/m .  Wt Readings from Last 3 Encounters:   04/29/24 74.4 kg (164 lb)   04/16/24 74.4 kg (164 lb)   01/24/24 72.7 kg (160 lb 4.8 oz)     General Appearance:   Awake, Alert, No acute distress.   HEENT:  No scleral icterus; the mucous membranes were pink and moist.   Neck: No cervical bruits or jugular venous distention    Chest: The spine was straight. The chest was symmetric.   Lungs:   Respirations unlabored; the lungs are clear to auscultation. No wheezing   Cardiovascular:   Regular rate and rhythm.  S1, S2 normal.  No murmur or gallop   Abdomen:  No organomegaly, masses, bruits, or tenderness. Bowels sounds are present   Extremities: No peripheral edema bilaterally   Skin: No xanthelasma. Warm, Dry.   Musculoskeletal: No tenderness.   Neurologic: Mood and affect are appropriate.    Enc Vitals  BP: (!) 167/78  Pulse: 79  Resp: 16  Weight: 74.4 kg (164 lb)                                         Medical History  Surgical History Family History Social History   Past Medical History:   Diagnosis Date    Allergic rhinitis due to animal dander     Atrial fibrillation (H)     Xarelto    Cataract, mild-mod, ou 5/17/2015    Diagnostic skin and sensitization tests (aka ALLERGENS)     Skin test 5/5/10 pos. for:  cat/dog/T/RW     Ex-smoker     Lyme disease      Giancarlo    Osteoporosis     Seasonal allergic rhinitis skin test 5/5/10 pos. for:  cat/dog/T/RW    Past Surgical History:   Procedure Laterality Date    HYSTERECTOMY      LAPAROSCOPIC ASSISTED HYSTERECTOMY VAGINAL      ZZC APPENDECTOMY      Family History   Problem Relation Age of Onset    Diabetes Brother     Hypertension Brother     Asthma Daughter     Cancer No family hx of     Cerebrovascular Disease No family hx of     Thyroid Disease No family hx of     Glaucoma No family hx of     Macular Degeneration No family hx of     Social History     Socioeconomic History    Marital status:      Spouse name: Not on file    Number of children: 5    Years of education: 8    Highest education level: Not on file   Occupational History    Occupation: [employer]     Employer: [employer]     Comment: 15 years   Tobacco Use    Smoking status: Former     Current packs/day: 0.00     Average packs/day: 1 pack/day for 10.0 years (10.0 ttl pk-yrs)     Types: Cigarettes     Start date: 1997     Quit date: 2007     Years since quittin.0    Smokeless tobacco: Never   Vaping Use    Vaping status: Never Used   Substance and Sexual Activity    Alcohol use: No     Alcohol/week: 0.0 standard drinks of alcohol    Drug use: No    Sexual activity: Never     Birth control/protection: Surgical     Comment: PARTIAL HYST   Other Topics Concern    Parent/sibling w/ CABG, MI or angioplasty before 65F 55M? Not Asked   Social History Narrative    Not on file     Social Determinants of Health     Financial Resource Strain: Low Risk  (2024)    Financial Resource Strain     Within the past 12 months, have you or your family members you live with been unable to get utilities (heat, electricity) when it was really needed?: No   Food Insecurity: Low Risk  (2024)    Food Insecurity     Within the past 12 months, did you worry that your food would run out before you got money to buy more?: No      Within the past 12 months, did the food you bought just not last and you didn t have money to get more?: No   Transportation Needs: Low Risk  (1/24/2024)    Transportation Needs     Within the past 12 months, has lack of transportation kept you from medical appointments, getting your medicines, non-medical meetings or appointments, work, or from getting things that you need?: No   Physical Activity: Not on file   Stress: Not on file   Social Connections: Socially Integrated (1/9/2024)    Received from [facility] [employer] [facility]  [facility]    Social Connections     Frequency of Communication with Friends and Family: 0   Interpersonal Safety: Not on file   Housing Stability: Low Risk  (1/24/2024)    Housing Stability     Do you have housing? : Yes     Are you worried about losing your housing?: No          Medications  Allergies   Current Outpatient Medications   Medication Sig Dispense Refill    acetaminophen (TYLENOL) 325 MG tablet Take 325 mg by mouth every 6 hours as needed for mild pain      amLODIPine (NORVASC) 2.5 MG tablet Take 1 tablet (2.5 mg) by mouth daily 30 tablet 3    calcium-vitamin D-vitamin K (VIACTIV) 500-500-40 MG-UNT-MCG CHEW Take 1 tablet by mouth daily      levothyroxine (SYNTHROID/LEVOTHROID) 50 MCG tablet TAKE 1 TABLET(50 MCG) BY MOUTH DAILY 90 tablet 0    meclizine (ANTIVERT) 25 MG tablet Take 1 tablet (25 mg) by mouth daily as needed for dizziness 30 tablet 3    metoprolol succinate ER (TOPROL XL) 50 MG 24 hr tablet Take 1 tablet (50 mg) by mouth daily Note dose change of tabs. Only take 1 tab 90 tablet 0    nitroGLYcerin (NITROSTAT) 0.4 MG sublingual tablet Place 1 tablet (0.4 mg) under the tongue every 5 minutes as needed for chest pain If you are still having symptoms after 3 doses (15 minutes) call 911. 25 tablet 0    rivaroxaban ANTICOAGULANT (XARELTO) 15 MG TABS tablet Take 1 tablet (15 mg) by mouth every morning 90 tablet 3     triamcinolone (KENALOG) 0.1 % external cream Apply topically 2 times daily Apply to outer ear 15 g 2    vitamin B-2 (RIBOFLAVIN) 25 MG TABS tablet Take 1 tablet by mouth daily      fluocinolone acetonide oil 0.01 % ear drops Place 0.25 mLs (5 drops) into both ears 2 times daily as needed (ear itching) (Patient not taking: Reported on 4/16/2024) 20 mL 1    ipratropium (ATROVENT) 0.06 % nasal spray Spray 2 sprays into both nostrils 4 times daily as needed for rhinitis (runny nose) (Patient not taking: Reported on 4/16/2024) 15 mL 3      Allergies   Allergen Reactions    Sulfa Antibiotics Rash         Lab Results    Chemistry/lipid CBC Cardiac Enzymes/BNP/TSH/INR   Recent Labs   Lab Test 04/24/23  1638 07/06/22  1658   TRIG 295*  --    LDL 85  --    BUN  --  39*   NA  --  139   CO2  --  27    Recent Labs   Lab Test 07/06/22  1658   WBC 6.3   HGB 12.3   HCT 37.6   *       Recent Labs   Lab Test 05/25/23  1710 04/23/22  1123   TROPONINI  --  <0.01   BNP  --  818*   TSH 4.99*  --         A total of 33 minutes was spent reviewing patient's medical records, obtaining history and performing examination, as well as discussing diagnoses/ recommendations with patient and answering all questions.                        Thank you for allowing me to participate in the care of your patient.      Sincerely,     Dulce Gramajo MD     [facility]  cc:   No referring provider defined for this encounter.

## 2024-05-17 SDOH — HEALTH STABILITY: PHYSICAL HEALTH: ON AVERAGE, HOW MANY MINUTES DO YOU ENGAGE IN EXERCISE AT THIS LEVEL?: 20 MIN

## 2024-05-17 SDOH — HEALTH STABILITY: PHYSICAL HEALTH: ON AVERAGE, HOW MANY DAYS PER WEEK DO YOU ENGAGE IN MODERATE TO STRENUOUS EXERCISE (LIKE A BRISK WALK)?: 4 DAYS

## 2024-05-17 ASSESSMENT — SOCIAL DETERMINANTS OF HEALTH (SDOH): HOW OFTEN DO YOU GET TOGETHER WITH FRIENDS OR RELATIVES?: MORE THAN THREE TIMES A WEEK

## 2024-05-17 NOTE — COMMUNITY RESOURCES LIST (ENGLISH)
May 17, 2024           YOUR PERSONALIZED LIST OF SERVICES & PROGRAMS               Bill Payment Assistance      [facility] [employer] ([employer]) - Energy Assistance Program  [address]  [place] [address] (Distance: 1.8 miles)  Phone: [phone]  Language: English  Fee: Free  Accessibility: [place] accessible, Blind accommodation, Deaf or hard of hearing      [facility]  Office - [facility]  [address] [facility] [address] [place] [address] (Distance: 1.8 miles)  Phone: [phone]  Language: English  Fee: Free  Accessibility: [place] accessible      - Dislocated Worker/Adult [employer] Employment Program  Phone: [phone]  Email: eliane@[email or web address]  Website: https://[employer].com/services/employment-services/dislocated-worker-program/  Language: English, Cameroonian  Hours: Mon 8:00 AM - 4:30 PM Tue 8:00 AM - 4:30 PM Wed 8:00 AM - 4:30 PM Thu 8:00 AM - 4:30 PM Fri 8:00 AM - 4:30 PM  Fee: Free  Accessibility: [place] accessible               IMPORTANT NUMBERS & WEBSITES        Emergency Services  911  .   [employer] Way  211 http://211unitedway.org  .   Poison Control  (734) 378-2372 http://mnpoison.org http://wis[place]poison.org  .     Suicide and Crisis Lifeline  988 http://988lifeline.org  .   Childhelp National Child Abuse Hotline  210.305.7824 http://Childhelphotline.org   .   National Sexual Assault Hotline  (945) 536-9321 (HOPE) http://Rainn.org   .     National Runaway Safeline  (452) 374-5661 (RUNAWAY) http://1800runaway.org  .   Pregnancy & Postpartum Support  Call/text 240-960-5454  [place]: http://ppsupportmn.org  [place]: http://psichapters.com/wi  .   Substance Abuse National Helpline ([facility])  268-254-HELP (5857) http://Findtreatment.gov   .                DISCLAIMER: These resources have been generated via the [employer] Platform. [employer] does not endorse any service providers mentioned in this resource list. [facility] does not  guarantee that the services mentioned in this resource list will be available to you or will improve your health or wellness.    [facility]

## 2024-05-20 ENCOUNTER — OFFICE VISIT (OUTPATIENT)
Dept: INTERNAL MEDICINE | Facility: CLINIC | Age: 89
End: 2024-05-20
Payer: COMMERCIAL

## 2024-05-20 VITALS
DIASTOLIC BLOOD PRESSURE: 64 MMHG | WEIGHT: 163.5 LBS | TEMPERATURE: 97.7 F | HEART RATE: 62 BPM | HEIGHT: 62 IN | RESPIRATION RATE: 16 BRPM | OXYGEN SATURATION: 97 % | BODY MASS INDEX: 30.09 KG/M2 | SYSTOLIC BLOOD PRESSURE: 122 MMHG

## 2024-05-20 DIAGNOSIS — J44.9 CHRONIC OBSTRUCTIVE PULMONARY DISEASE, UNSPECIFIED COPD TYPE (H): ICD-10-CM

## 2024-05-20 DIAGNOSIS — I10 ESSENTIAL HYPERTENSION: ICD-10-CM

## 2024-05-20 DIAGNOSIS — Z29.11 NEED FOR VACCINATION AGAINST RESPIRATORY SYNCYTIAL VIRUS: ICD-10-CM

## 2024-05-20 DIAGNOSIS — E03.8 SUBCLINICAL HYPOTHYROIDISM: ICD-10-CM

## 2024-05-20 DIAGNOSIS — Z74.09 IMPAIRED MOBILITY: ICD-10-CM

## 2024-05-20 DIAGNOSIS — E55.9 VITAMIN D DEFICIENCY: ICD-10-CM

## 2024-05-20 DIAGNOSIS — E23.6: ICD-10-CM

## 2024-05-20 DIAGNOSIS — R79.9 ABNORMAL FINDING OF BLOOD CHEMISTRY, UNSPECIFIED: ICD-10-CM

## 2024-05-20 DIAGNOSIS — R42 VERTIGO: ICD-10-CM

## 2024-05-20 DIAGNOSIS — N18.31 CHRONIC KIDNEY DISEASE, STAGE 3A (H): ICD-10-CM

## 2024-05-20 DIAGNOSIS — M54.50 BILATERAL LOW BACK PAIN WITHOUT SCIATICA, UNSPECIFIED CHRONICITY: ICD-10-CM

## 2024-05-20 DIAGNOSIS — I48.20 CHRONIC ATRIAL FIBRILLATION (H): ICD-10-CM

## 2024-05-20 DIAGNOSIS — M81.0 AGE-RELATED OSTEOPOROSIS WITHOUT CURRENT PATHOLOGICAL FRACTURE: ICD-10-CM

## 2024-05-20 DIAGNOSIS — Z13.9 ENCOUNTER FOR SCREENING INVOLVING SOCIAL DETERMINANTS OF HEALTH (SDOH): Primary | ICD-10-CM

## 2024-05-20 DIAGNOSIS — I71.40 ABDOMINAL AORTIC ANEURYSM (AAA) 3.0 CM TO 5.0 CM IN DIAMETER IN FEMALE (H): ICD-10-CM

## 2024-05-20 DIAGNOSIS — G31.84 MILD COGNITIVE IMPAIRMENT: ICD-10-CM

## 2024-05-20 LAB
ALBUMIN SERPL BCG-MCNC: 4.5 G/DL (ref 3.5–5.2)
ALP SERPL-CCNC: 84 U/L (ref 40–150)
ALT SERPL W P-5'-P-CCNC: 18 U/L (ref 0–50)
ANION GAP SERPL CALCULATED.3IONS-SCNC: 11 MMOL/L (ref 7–15)
AST SERPL W P-5'-P-CCNC: 22 U/L (ref 0–45)
BILIRUB SERPL-MCNC: 0.3 MG/DL
BUN SERPL-MCNC: 37.6 MG/DL (ref 8–23)
CALCIUM SERPL-MCNC: 10 MG/DL (ref 8.8–10.2)
CHLORIDE SERPL-SCNC: 105 MMOL/L (ref 98–107)
CHOLEST SERPL-MCNC: 181 MG/DL
CREAT SERPL-MCNC: 0.9 MG/DL (ref 0.51–0.95)
CREAT UR-MCNC: 129 MG/DL
DEPRECATED HCO3 PLAS-SCNC: 23 MMOL/L (ref 22–29)
EGFRCR SERPLBLD CKD-EPI 2021: 61 ML/MIN/1.73M2
ERYTHROCYTE [DISTWIDTH] IN BLOOD BY AUTOMATED COUNT: 13.7 % (ref 10–15)
FASTING STATUS PATIENT QL REPORTED: NO
FASTING STATUS PATIENT QL REPORTED: NO
GLUCOSE SERPL-MCNC: 92 MG/DL (ref 70–99)
HBA1C MFR BLD: 5.5 % (ref 0–5.6)
HCT VFR BLD AUTO: 35.7 % (ref 35–47)
HDLC SERPL-MCNC: 38 MG/DL
HGB BLD-MCNC: 11.6 G/DL (ref 11.7–15.7)
LDLC SERPL CALC-MCNC: 91 MG/DL
MCH RBC QN AUTO: 33.7 PG (ref 26.5–33)
MCHC RBC AUTO-ENTMCNC: 32.5 G/DL (ref 31.5–36.5)
MCV RBC AUTO: 104 FL (ref 78–100)
MICROALBUMIN UR-MCNC: <12 MG/L
MICROALBUMIN/CREAT UR: NORMAL MG/G{CREAT}
NONHDLC SERPL-MCNC: 143 MG/DL
PLATELET # BLD AUTO: 213 10E3/UL (ref 150–450)
POTASSIUM SERPL-SCNC: 5.6 MMOL/L (ref 3.4–5.3)
PROT SERPL-MCNC: 7.3 G/DL (ref 6.4–8.3)
RBC # BLD AUTO: 3.44 10E6/UL (ref 3.8–5.2)
SODIUM SERPL-SCNC: 139 MMOL/L (ref 135–145)
TRIGL SERPL-MCNC: 258 MG/DL
VIT B12 SERPL-MCNC: 472 PG/ML (ref 232–1245)
VIT D+METAB SERPL-MCNC: 39 NG/ML (ref 20–50)
WBC # BLD AUTO: 5.9 10E3/UL (ref 4–11)

## 2024-05-20 PROCEDURE — 99214 OFFICE O/P EST MOD 30 MIN: CPT | Mod: 25 | Performed by: INTERNAL MEDICINE

## 2024-05-20 PROCEDURE — 80061 LIPID PANEL: CPT | Performed by: INTERNAL MEDICINE

## 2024-05-20 PROCEDURE — 82570 ASSAY OF URINE CREATININE: CPT | Performed by: INTERNAL MEDICINE

## 2024-05-20 PROCEDURE — 82043 UR ALBUMIN QUANTITATIVE: CPT | Performed by: INTERNAL MEDICINE

## 2024-05-20 PROCEDURE — 83036 HEMOGLOBIN GLYCOSYLATED A1C: CPT | Performed by: INTERNAL MEDICINE

## 2024-05-20 PROCEDURE — 85027 COMPLETE CBC AUTOMATED: CPT | Performed by: INTERNAL MEDICINE

## 2024-05-20 PROCEDURE — 82306 VITAMIN D 25 HYDROXY: CPT | Performed by: INTERNAL MEDICINE

## 2024-05-20 PROCEDURE — G0439 PPPS, SUBSEQ VISIT: HCPCS | Performed by: INTERNAL MEDICINE

## 2024-05-20 PROCEDURE — 80053 COMPREHEN METABOLIC PANEL: CPT | Performed by: INTERNAL MEDICINE

## 2024-05-20 PROCEDURE — 36415 COLL VENOUS BLD VENIPUNCTURE: CPT | Performed by: INTERNAL MEDICINE

## 2024-05-20 PROCEDURE — 82607 VITAMIN B-12: CPT | Performed by: INTERNAL MEDICINE

## 2024-05-20 RX ORDER — RESPIRATORY SYNCYTIAL VIRUS VACCINE 120MCG/0.5
0.5 KIT INTRAMUSCULAR ONCE
Qty: 1 EACH | Refills: 0 | Status: SHIPPED | OUTPATIENT
Start: 2024-05-20 | End: 2024-05-20

## 2024-05-20 ASSESSMENT — PAIN SCALES - GENERAL: PAINLEVEL: NO PAIN (0)

## 2024-05-20 NOTE — PATIENT INSTRUCTIONS
Getting a RSV vaccine is a good idea .   Taking calcium 1000mg a day  and vitamin D 1000 international unit(s) a day .

## 2024-05-20 NOTE — PROGRESS NOTES
Preventive Care Visit  [facility]  Nya Vasquez MD, Internal Medicine  May 20, 2024      Assessment & Plan     Need for vaccination against respiratory syncytial virus  Discussed they will think about it    Encounter for screening involving social determinants of health (SDoH)      Chronic kidney disease, stage 3a (H)  Creatinine   Date Value Ref Range Status   04/29/2024 0.95 0.51 - 0.95 mg/dL Final   06/29/2020 0.88 0.52 - 1.04 mg/dL Final     Stable will follow GFR  - Albumin Random Urine Quantitative with Creat Ratio; Future  - Albumin Random Urine Quantitative with Creat Ratio    Hyperplasia of anterior pituitary (H24)  Mild pituitary hyperplasia without mass effect or focal lesion.  This was noticed in 5/5/2023 prolactin level IGF levels were normal.  She has no visual field problems.  This does not explain her dizzy spells.  Abdominal aortic aneurysm (AAA) 3.0 cm to 5.0 cm in diameter in female (H24)  She is being followed annually with ultrasound checks with no change in growth.  She has had her check this year we will check again next year with abdominal ultrasound  She has a 3.8 cm left adnexal cyst.  Age-related osteoporosis without current pathological fracture  Moat history of diagnosis of osteoporosis has not been on treatment was offered IV Reclast which she declined I do not see the bone density result on file.  She is willing to recheck it again  - DX Bone Density; Future    Chronic obstructive pulmonary disease, unspecified COPD type (H)  Not active    Chronic atrial fibrillation (H)  Currently in sinus rate controlled is on Xarelto.    Mild cognitive impairment  Was in her problem list but she passed the mini cog test and is quite independent in her ADLs and IADLs and can continue to drive short distances.  She does all her own work except her medications are done by her daughter.    Subclinical hypothyroidism  TSH   Date Value Ref Range Status   04/29/2024 4.50 (H) 0.30 -  4.20 uIU/mL Final   06/29/2020 5.56 (H) 0.40 - 4.00 mU/L Final   She is on low-dose levothyroxine I do not think the dose needs to be adjusted    - Lipid panel reflex to direct LDL Non-fasting; Future  - respiratory syncytial virus vaccine, bivalent (ABRYSVO) injection; Inject 0.5 mLs into the muscle once for 1 dose  - CBC with platelets; Future  - Comprehensive metabolic panel; Future  - Hemoglobin A1c; Future  - Vitamin D Deficiency; Future  - Lipid panel reflex to direct LDL Non-fasting  - CBC with platelets  - Comprehensive metabolic panel  - Hemoglobin A1c  - Vitamin D Deficiency    Bilateral low back pain without sciatica, unspecified chronicity  Back pain without radiation down the legs only happens with exertion.  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  I did tell her she has arthritis we have an MRI of the lumbar spine from 2019 that shows arthritis throughout her spine.  I did tell her that with once arthritis develops there is no cure and she does have to look after her back and not do things that would trigger back pain.    Essential hypertension  Blood pressure is in excellent control amlodipine was recently started by cardiology to maximize her blood pressure control.  She has no orthostatic drop when checking today.    Abnormal finding of blood chemistry, unspecified    - Hemoglobin A1c; Future  - Hemoglobin A1c    Vitamin D deficiency    - Vitamin D Deficiency; Future  - Vitamin D Deficiency    Vertigo  She has these episodes of feeling like she is about to faint and calls them dizzy spells.  As far as she knows the only happen like once or twice a month and primarily when she gets into the shower and she stands and takes a shower.  She does not happen happen when she does other exertional things or changes her position or her head position.  Nonspecific kind of vertigo will get a shower chair.  Is going to get her CTA head and neck as a routine surveillance of her MCA aneurysm.  If all  "these are normal and she still continues to have symptoms we may consider adding an echocardiogram    Impaired mobility    - Bath Seat/Shower Chair Order for DME - ONLY FOR DME    Overall doing well she has strong support from her daughter lives independently.  All her symptoms are not intractable including incontinence or pain.    Denies COVID-vaccine boosting    BMI  Estimated body mass index is 29.9 kg/m  as calculated from the following:    Height as of this encounter: 1.575 m (5' 2\").    Weight as of this encounter: 74.2 kg (163 lb 8 oz).       Counseling  Appropriate preventive services were discussed with this patient, including applicable screening as appropriate for fall prevention, nutrition, physical activity, Tobacco-use cessation, weight loss and cognition.  Checklist reviewing preventive services available has been given to the patient.  Reviewed patient's diet, addressing concerns and/or questions.   The patient was instructed to see the dentist every 6 months.   Discussed possible causes of fatigue. Updated plan of care.  Patient reported difficulty with activities of daily living were addressed today.The patient was provided with written information regarding signs of hearing loss.   Information on urinary incontinence and treatment options given to patient.           Subjective   Rosemarie is a very pleasant 89 year old, presenting for the following:establish care   Wellness Visit and Recheck Medication (Pt is here with daughter for Pt's annual wellness visit)  Lives in a Trailer , drive   Lives alone in a trailer one level does her owqn IADLS and ADLS helps with  medication no pain , some balance problems one fall in the al;st 3 months    Sometimes gets constipated   , pellet like stones   Sometimes takes sennokot   Has to goes a lot has to wear depends but not a lot of accident   Is more careful  With her own's snowplowing and mowing of the lawn        5/20/2024    10:53 AM   Additional Questions "   Roomed by minor   Accompanied by daughter         5/20/2024    10:53 AM   Patient Reported Additional Medications   Patient reports taking the following new medications none         Health Care Directive  Patient has a Health Care Directive on file  Advance care planning document is on file and is current.    HPI              5/17/2024   General Health   How would you rate your overall physical health? Good   Feel stress (tense, anxious, or unable to sleep) Not at all         5/17/2024   Nutrition   Diet: Regular (no restrictions)         5/17/2024   Exercise   Days per week of moderate/strenous exercise 4 days   Average minutes spent exercising at this level 20 min         5/17/2024   Social Factors   Frequency of gathering with friends or relatives More than three times a week   Worry food won't last until get money to buy more No   Food not last or not have enough money for food? No   Do you have housing?  Yes   Are you worried about losing your housing? No   Lack of transportation? No   Unable to get utilities (heat,electricity)? Yes   Want help with housing or utility concern? (!) YES   (!) FINANCIAL RESOURCE STRAIN CONCERN      5/20/2024   Fall Risk   Gait Speed Test (Document in seconds) 4.69   Gait Speed Test Interpretation Less than or equal to 5.00 seconds - PASS          5/17/2024   Activities of Daily Living- Home Safety   Needs help with the following daily activites Shopping   Safety concerns in the home None of the above         5/17/2024   Dental   Dentist two times every year? (!) NO         5/17/2024   Hearing Screening   Hearing concerns? (!) I NEED TO ASK PEOPLE TO SPEAK UP OR REPEAT THEMSELVES.    (!) IT'S HARD TO FOLLOW A CONVERSATION IN A NOISY RESTAURANT OR CROWDED ROOM.    (!) TROUBLE UNDERSTANDING SOFT OR WHISPERED SPEECH.    (!) TROUBLE UNDERSTANDING SPEECH ON THE TELEPHONE         5/17/2024   Driving Risk Screening   Patient/family members have concerns about driving No          2024   General Alertness/Fatigue Screening   Have you been more tired than usual lately? (!) YES         2024   Urinary Incontinence Screening   Bothered by leaking urine in past 6 months Yes         2024   TB Screening   Were you born outside of the [place]? Yes         Today's PHQ-2 Score:       2024    10:39 AM   PHQ-2 (  [employer])   Q1: Little interest or pleasure in doing things 1   Q2: Feeling down, depressed or hopeless 0   PHQ-2 Score 1   Q1: Little interest or pleasure in doing things Several days   Q2: Feeling down, depressed or hopeless Not at all   PHQ-2 Score 1           2024   Substance Use   Alcohol more than 3/day or more than 7/wk Not Applicable   Do you have a current opioid prescription? No   How severe/bad is pain from 1 to 10? 8/10   Do you use any other substances recreationally? No     Social History     Tobacco Use    Smoking status: Former     Current packs/day: 0.00     Average packs/day: 1 pack/day for 10.0 years (10.0 ttl pk-yrs)     Types: Cigarettes     Start date: 1997     Quit date: 2007     Years since quittin.1    Smokeless tobacco: Never   Vaping Use    Vaping status: Never Used   Substance Use Topics    Alcohol use: No     Alcohol/week: 0.0 standard drinks of alcohol    Drug use: No                    Reviewed and updated as needed this visit by Provider                      Current providers sharing in care for this patient include:  Patient Care Team:  Sade Marquez DO as PCP - General (Internal Medicine)  Dia Hartman MD as MD (Vascular Surgery)  Conor Luevano MD as MD (Internal Medicine)  Clifton Erickson DO as MD (Neurology)  Aravind Hunt MD as Assigned Surgical Provider  Sade Marquez DO as Assigned PCP  Dulce Gramajo MD as MD (Cardiovascular Disease)  Colt Amezcua MD as MD (Neurological Surgery)  Yue Salter PA-C as Assigned Heart and Vascular Provider    The following health  maintenance items are reviewed in Epic and correct as of today:  Health Maintenance   Topic Date Due    MICROALBUMIN  Never done    RSV VACCINE (Pregnancy & 60+) (1 - 1-dose 60+ series) Never done    MEDICARE ANNUAL WELLNESS VISIT  03/19/2023    HEMOGLOBIN  07/06/2023    COVID-19 Vaccine (4 - 2023-24 season) 09/01/2023    LIPID  04/24/2024    ANNUAL REVIEW OF HM ORDERS  01/24/2025    BMP  04/29/2025    TSH W/FREE T4 REFLEX  04/29/2025    FALL RISK ASSESSMENT  05/20/2025    DEXA  12/13/2025    ADVANCE CARE PLANNING  03/20/2027    DTAP/TDAP/TD IMMUNIZATION (2 - Td or Tdap) 07/06/2032    SPIROMETRY  Completed    COPD ACTION PLAN  Completed    PHQ-2 (once per calendar year)  Completed    INFLUENZA VACCINE  Completed    Pneumococcal Vaccine: 65+ Years  Completed    URINALYSIS  Completed    ZOSTER IMMUNIZATION  Completed    IPV IMMUNIZATION  Aged Out    HPV IMMUNIZATION  Aged Out    MENINGITIS IMMUNIZATION  Aged Out    RSV MONOCLONAL ANTIBODY  Aged Out    MAMMO SCREENING  Discontinued     Current Outpatient Medications   Medication Sig Dispense Refill    acetaminophen (TYLENOL) 325 MG tablet Take 325 mg by mouth every 6 hours as needed for mild pain      amLODIPine (NORVASC) 2.5 MG tablet Take 1 tablet (2.5 mg) by mouth daily 90 tablet 2    calcium-vitamin D-vitamin K (VIACTIV) 500-500-40 MG-UNT-MCG CHEW Take 1 tablet by mouth daily      fluocinolone acetonide oil 0.01 % ear drops Place 0.25 mLs (5 drops) into both ears 2 times daily as needed (ear itching) 20 mL 1    levothyroxine (SYNTHROID/LEVOTHROID) 50 MCG tablet TAKE 1 TABLET(50 MCG) BY MOUTH DAILY 90 tablet 0    meclizine (ANTIVERT) 25 MG tablet Take 1 tablet (25 mg) by mouth daily as needed for dizziness 30 tablet 3    metoprolol succinate ER (TOPROL XL) 50 MG 24 hr tablet Take 1 tablet (50 mg) by mouth daily Note dose change of tabs. Only take 1 tab 90 tablet 0    nitroGLYcerin (NITROSTAT) 0.4 MG sublingual tablet Place 1 tablet (0.4 mg) under the tongue every 5  "minutes as needed for chest pain If you are still having symptoms after 3 doses (15 minutes) call 911. 25 tablet 0    respiratory syncytial virus vaccine, bivalent (ABRYSVO) injection Inject 0.5 mLs into the muscle once for 1 dose 1 each 0    rivaroxaban ANTICOAGULANT (XARELTO) 15 MG TABS tablet Take 1 tablet (15 mg) by mouth every morning 90 tablet 3    triamcinolone (KENALOG) 0.1 % external cream Apply topically 2 times daily Apply to outer ear 15 g 2     No current facility-administered medications for this visit.            Objective    Exam  /64 (BP Location: Left arm, Patient Position: Sitting, Cuff Size: Adult Regular)   Pulse 62   Temp 97.7  F (36.5  C) (Tympanic)   Resp 16   Ht 1.575 m (5' 2\")   Wt 74.2 kg (163 lb 8 oz)   LMP  (LMP Unknown)   SpO2 97%   Breastfeeding No   BMI 29.90 kg/m     Estimated body mass index is 29.9 kg/m  as calculated from the following:    Height as of this encounter: 1.575 m (5' 2\").    Weight as of this encounter: 74.2 kg (163 lb 8 oz).    Physical Exam  GENERAL: alert and no distress  NECK: no adenopathy, no asymmetry, masses, or scars  RESP: lungs clear to auscultation - no rales, rhonchi or wheezes  CV: regular rate and rhythm, normal S1 S2, no S3 or S4, no murmur, click or rub, no peripheral edema  MS: no gross musculoskeletal defects noted, no edema  Can get up unassisted heel lift is normal.      5/20/2024   Mini Cog   Clock Draw Score 2 Normal   3 Item Recall 3 objects recalled   Mini Cog Total Score 5              Signed Electronically by: Nya Vasquez MD    "

## 2024-05-22 ENCOUNTER — HOSPITAL ENCOUNTER (OUTPATIENT)
Dept: CT IMAGING | Facility: HOSPITAL | Age: 89
Discharge: HOME | End: 2024-05-22
Attending: NEUROLOGICAL SURGERY | Admitting: NEUROLOGICAL SURGERY
Payer: COMMERCIAL

## 2024-05-22 DIAGNOSIS — I72.9 ANEURYSM (H): ICD-10-CM

## 2024-05-22 PROCEDURE — 70496 CT ANGIOGRAPHY HEAD: CPT

## 2024-05-22 PROCEDURE — 250N000011 HC RX IP 250 OP 636: Performed by: NEUROLOGICAL SURGERY

## 2024-05-22 PROCEDURE — 250N000009 HC RX 250: Performed by: NEUROLOGICAL SURGERY

## 2024-05-22 RX ORDER — IOPAMIDOL 755 MG/ML
67 INJECTION, SOLUTION INTRAVASCULAR ONCE
Status: COMPLETED | OUTPATIENT
Start: 2024-05-22 | End: 2024-05-22

## 2024-05-22 RX ADMIN — IOPAMIDOL 67 ML: 755 INJECTION, SOLUTION INTRAVENOUS at 18:30

## 2024-05-22 RX ADMIN — SODIUM CHLORIDE 90 ML: 9 INJECTION, SOLUTION INTRAVENOUS at 18:30

## 2024-05-28 DIAGNOSIS — R79.9 ABNORMAL FINDING OF BLOOD CHEMISTRY, UNSPECIFIED: ICD-10-CM

## 2024-05-28 DIAGNOSIS — E87.5 HYPERKALEMIA: Primary | ICD-10-CM

## 2024-05-29 ENCOUNTER — VIRTUAL VISIT (OUTPATIENT)
Dept: NEUROSURGERY | Facility: CLINIC | Age: 89
End: 2024-05-29
Payer: COMMERCIAL

## 2024-05-29 DIAGNOSIS — I72.9 ANEURYSM (H): Primary | ICD-10-CM

## 2024-05-29 PROCEDURE — 99442 PR PHYSICIAN TELEPHONE EVALUATION 11-20 MIN: CPT | Mod: 93 | Performed by: NURSE PRACTITIONER

## 2024-05-29 ASSESSMENT — PAIN SCALES - GENERAL: PAINLEVEL: MODERATE PAIN (5)

## 2024-05-29 NOTE — LETTER
2024       RE: Rosemarie Fry  [address] [place] [address]     Dear Colleague,    Thank you for referring your patient, Rosemarie Fry, to the [facility]UR[facility] [place] at [facility]. Please see a copy of my visit note below.    [facility]  Department of Neurosurgery      Name: Rosemarie Fry  MRN: [id number]  Age: 89 year old  : 1934  Referring provider: Colt Amezcua  2024      Chief Complaint:       History of Present Illness:   Rosemarie Fry is a 89 year old female with a history of hypertension, aortic aneurysm who is seen today for a follow-up after recent imaging.    Patient was discovered to have a left MCA bifurcation aneurysm in 2022 during workup for dizziness.  Initial visit with Dr. Amezcua on 2024.  CTA head and neck was recommended for follow-up of the aneurysm.    Today I had a phone visit with patient's daughter, Janis.  Overall patient has been doing well except ongoing dizziness with standing up and moving.  Per daughter, for the past 6 months or so patient has been reporting a burning sensation about her left eyebrow area.  This is somewhat intermittent.      Review of Systems:   Pertinent items are noted in HPI or as in patient entered ROS below, remainder of complete ROS is negative.        No data to display               Imagin2024 CTA head and neck:  IMPRESSION:   HEAD CTA:   1.  Stable 3 mm saccular aneurysm arising from the left middle cerebral artery near the origin of the left anterior temporal artery. No new or enlarging intracranial aneurysm.  2.  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    NECK CTA:  1.  No significant change since 11/3/2022. Patent major cervical arteries. No large vessel occlusion or evidence  of dissection.  2.  Mild atherosclerotic plaque at the carotid bifurcations and distal cervical segments of the internal carotid arteries. No focal plaque ulceration or measurable carotid stenosis.  3.  Widely patent codominant vertebral arteries.    Assessment:  Cerebral aneurysm, unruptured  Follow-up    Plan:  Recent CTA shows stable 3 mm saccular aneurysm arising from the left MCA.  Will review imaging with Dr. Amezcua and will contact the patient with follow-up recommendations.  Today we discussed that dizziness and left forehead burning sensation is not related to the brain aneurysm.       I spent 20 minutes on patient care activities related to this encounter on the date of service, including time spent reviewing the chart, obtaining history and examination and in counseling the patient, and in documentation in the electronic medical record.        Again, thank you for allowing me to participate in the care of your patient.      Sincerely,    TONY Hamm CNP

## 2024-05-29 NOTE — NURSING NOTE
Is the patient currently in the state of [place]? YES    Visit mode:TELEPHONE    If the visit is dropped, the patient can be reconnected by: VIDEO VISIT: Text to cell phone:   Telephone Information:   Mobile [phone]       Will anyone else be joining the visit? NO  (If patient encounters technical issues they should call [phone] :[id number])    How would you like to obtain your AVS? MyChart    Are changes needed to the allergy or medication list? No    Are refills needed on medications prescribed by this physician? NO    Reason for visit: Follow Up    Catalina IVERSON

## 2024-05-29 NOTE — PROGRESS NOTES
Virtual Visit Details    Type of service:  Telephone Visit   Phone call duration: 15 minutes   Originating Location (pt. Location): Home    Distant Location (provider location):  Off-site    [facility]  Department of Neurosurgery      Name: Rosemarie Fry  MRN: [id number]  Age: 89 year old  : 1934  Referring provider: Colt Amezcua  2024      Chief Complaint:   Cerebral aneurysm, nonruptured  Follow-up    History of Present Illness:   Rosemarie Fry is a 89 year old female with a history of hypertension, aortic aneurysm who is seen today for a follow-up after recent imaging.    Patient was discovered to have a left MCA bifurcation aneurysm in 2022 during workup for dizziness.  Initial visit with Dr. Amezcua on 2024.  CTA head and neck was recommended for follow-up of the aneurysm.    Today I had a phone visit with patient's daughter, Janis.  Overall patient has been doing well except ongoing dizziness with standing up and moving.  Per daughter, for the past 6 months or so patient has been reporting a burning sensation about her left eyebrow area.  This is somewhat intermittent.      Review of Systems:   Pertinent items are noted in HPI or as in patient entered ROS below, remainder of complete ROS is negative.        No data to display               Imagin2024 CTA head and neck:  IMPRESSION:   HEAD CTA:   1.  Stable 3 mm saccular aneurysm arising from the left middle cerebral artery near the origin of the left anterior temporal artery. No new or enlarging intracranial aneurysm.  2.  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    NECK CTA:  1.  No significant change since 11/3/2022. Patent major cervical arteries. No large vessel occlusion or evidence of dissection.  2.  Mild atherosclerotic plaque at the carotid bifurcations and  distal cervical segments of the internal carotid arteries. No focal plaque ulceration or measurable carotid stenosis.  3.  Widely patent codominant vertebral arteries.    Assessment:  Cerebral aneurysm, unruptured  Follow-up    Plan:  Recent CTA shows stable 3 mm saccular aneurysm arising from the left MCA.  Will review imaging with Dr. Amezcua and will contact the patient with follow-up recommendations.  Today we discussed that dizziness and left forehead burning sensation is not related to the brain aneurysm.     Addendum on 6/6/2024: Recent CTA was reviewed by Dr. Amezcua.  As the aneurysm is very small, this is a very low risk of rupture.  Patient to follow-up in 2 years with repeat CTA.      I spent 20 minutes on patient care activities related to this encounter on the date of service, including time spent reviewing the chart, obtaining history and examination and in counseling the patient, and in documentation in the electronic medical record.      Carol JIMENEZ CNP  Department of Neurosurgery

## 2024-06-10 ENCOUNTER — ANCILLARY PROCEDURE (OUTPATIENT)
Dept: BONE DENSITY | Facility: CLINIC | Age: 89
End: 2024-06-10
Attending: INTERNAL MEDICINE
Payer: COMMERCIAL

## 2024-06-10 DIAGNOSIS — M81.0 AGE-RELATED OSTEOPOROSIS WITHOUT CURRENT PATHOLOGICAL FRACTURE: ICD-10-CM

## 2024-06-10 PROCEDURE — 77080 DXA BONE DENSITY AXIAL: CPT | Mod: TC | Performed by: RADIOLOGY

## 2024-06-10 PROCEDURE — 77081 DXA BONE DENSITY APPENDICULR: CPT | Mod: TC | Performed by: RADIOLOGY

## 2024-06-12 ENCOUNTER — VIRTUAL VISIT (OUTPATIENT)
Dept: INTERNAL MEDICINE | Facility: CLINIC | Age: 89
End: 2024-06-12
Payer: COMMERCIAL

## 2024-06-12 DIAGNOSIS — M81.0 SENILE OSTEOPOROSIS: ICD-10-CM

## 2024-06-12 DIAGNOSIS — H81.10 BENIGN PAROXYSMAL POSITIONAL VERTIGO, UNSPECIFIED LATERALITY: Primary | ICD-10-CM

## 2024-06-12 PROCEDURE — 99442 PR PHYSICIAN TELEPHONE EVALUATION 11-20 MIN: CPT | Mod: 93 | Performed by: INTERNAL MEDICINE

## 2024-06-12 RX ORDER — METHYLPREDNISOLONE SODIUM SUCCINATE 125 MG/2ML
125 INJECTION, POWDER, LYOPHILIZED, FOR SOLUTION INTRAMUSCULAR; INTRAVENOUS
Start: 2024-06-19

## 2024-06-12 RX ORDER — HEPARIN SODIUM (PORCINE) LOCK FLUSH IV SOLN 100 UNIT/ML 100 UNIT/ML
5 SOLUTION INTRAVENOUS
OUTPATIENT
Start: 2024-06-19

## 2024-06-12 RX ORDER — HEPARIN SODIUM,PORCINE 10 UNIT/ML
5-20 VIAL (ML) INTRAVENOUS DAILY PRN
OUTPATIENT
Start: 2024-06-19

## 2024-06-12 RX ORDER — ZOLEDRONIC ACID 5 MG/100ML
5 INJECTION, SOLUTION INTRAVENOUS ONCE
Status: CANCELLED
Start: 2024-06-19

## 2024-06-12 RX ORDER — MEPERIDINE HYDROCHLORIDE 25 MG/ML
25 INJECTION INTRAMUSCULAR; INTRAVENOUS; SUBCUTANEOUS EVERY 30 MIN PRN
OUTPATIENT
Start: 2024-06-19

## 2024-06-12 RX ORDER — ALBUTEROL SULFATE 90 UG/1
1-2 AEROSOL, METERED RESPIRATORY (INHALATION)
Start: 2024-06-19

## 2024-06-12 RX ORDER — DIPHENHYDRAMINE HYDROCHLORIDE 50 MG/ML
50 INJECTION INTRAMUSCULAR; INTRAVENOUS
Start: 2024-06-19

## 2024-06-12 RX ORDER — EPINEPHRINE 1 MG/ML
0.3 INJECTION, SOLUTION, CONCENTRATE INTRAVENOUS EVERY 5 MIN PRN
OUTPATIENT
Start: 2024-06-19

## 2024-06-12 RX ORDER — ALBUTEROL SULFATE 0.83 MG/ML
2.5 SOLUTION RESPIRATORY (INHALATION)
OUTPATIENT
Start: 2024-06-19

## 2024-06-12 NOTE — PROGRESS NOTES
"Rosemarie is a 89 year old who is being evaluated via a billable telephone visit.    What phone number would you like to be contacted at? [phone] (Janis- DAUGHTER)   How would you like to obtain your AVS? [place]  Originating Location (pt. Location): Home    Distant Location (provider location):  On-site    Assessment & Plan     Benign paroxysmal positional vertigo, unspecified laterality  Undiagnosed cause for dizziness.  She has had extensive workup including CT angiogram neck and head.  Has seen neurology but never went back for follow-up.  Has been to vestibular therapy once and had a bad experience.  Did  that there is no definitive treatment for dizziness the only reason we do testing is to exclude causes.  Only definitive treatment is for the vestibular cause.  Trial of vestibular therapy again.  - Physical Therapy  Referral; Future    Senile osteoporosis  Has advanced osteoporosis.  Will try Reclast IV as her daughter is taking it and has had good experience did put in the admit orders.          BMI  Estimated body mass index is 29.9 kg/m  as calculated from the following:    Height as of 5/20/24: 1.575 m (5' 2\").    Weight as of 5/20/24: 74.2 kg (163 lb 8 oz).             Subjective   Rosemarie is a 89 year old, presenting for the following health issues: Spoke to daughter.  Results (Discuss DXA results with daughter and follow up plan )      6/12/2024    10:51 AM   Additional Questions   Roomed by VESTA Juarez         6/12/2024    10:51 AM   Patient Reported Additional Medications   Patient reports taking the following new medications none     History of Present Illness       Reason for visit:  DXA results discussion    She eats 2-3 servings of fruits and vegetables daily.She consumes 0 sweetened beverage(s) daily.   She is taking medications regularly.                   Objective           Vitals:  No vitals were obtained today due to virtual visit.              Phone call duration:  15 " minutes  Signed Electronically by: Nya Vasquez MD

## 2024-07-12 ENCOUNTER — MYC MEDICAL ADVICE (OUTPATIENT)
Dept: INTERNAL MEDICINE | Facility: CLINIC | Age: 89
End: 2024-07-12
Payer: COMMERCIAL

## 2024-07-29 ENCOUNTER — THERAPY VISIT (OUTPATIENT)
Dept: PHYSICAL THERAPY | Facility: CLINIC | Age: 89
End: 2024-07-29
Attending: INTERNAL MEDICINE
Payer: COMMERCIAL

## 2024-07-29 DIAGNOSIS — H81.10 BENIGN PAROXYSMAL POSITIONAL VERTIGO, UNSPECIFIED LATERALITY: Primary | Chronic | ICD-10-CM

## 2024-07-29 PROCEDURE — 97162 PT EVAL MOD COMPLEX 30 MIN: CPT | Mod: GP | Performed by: PHYSICAL THERAPIST

## 2024-07-29 NOTE — PROGRESS NOTES
PHYSICAL THERAPY EVALUATION  Type of Visit: Evaluation       Fall Risk Screen:  Have you fallen 2 or more times in the past year?: No  Have you fallen and had an injury in the past year?: No  Timed Up and Go score (seconds): 9.69 and 9.94 seconds  Is patient a fall risk?: No    Subjective       Presenting condition or subjective complaint: dizziness    I can't explain it, not like a dizziness. Head feels heavy, not dizziness. Its getting a little worse. I have it 3-4 times a day, takes tylenol to help it. Past 2 years to 5 years. It started with a sinus infection and the pills helped. It came back and the prescription would not get refilled.  She said take tylenol and that helps, It goes away and I can function normally. When it comes on again I take more medication and sit down. Dizziness medication, meclazine, took today but did not take it for 1-2 weeks prior. Dtr had her take it today to prevent a problem. Gets nauseated?? and feels like she has to eat. I feel like I will faint or keel over. No falls. I can walk my house and in the dark without a problem, Only if not careful. Walking stick outside 30 minutes for exercise or to mailbox with stick. I kubi bike for 30-60 minutes every day.  Per dtr Janis, one time had PT in the past and it made her very nauseated and she refused to go back. (??NDBC in 2022)  Date of onset: 06/12/24 (order date)    Relevant medical history: Dizziness; High blood pressure; Incontinence; Osteoporosis; Severe dizziness; Thyroid problems   Dates & types of surgery:      Prior diagnostic imaging/testing results: MRI; CT scan     Prior therapy history for the same diagnosis, illness or injury:        Prior Level of Function  Transfers: Independent  Ambulation: Independent  ADL: Independent  IADL: Finances, Housekeeping, Laundry, Meal preparation, Medication management    Living Environment  Social support: Alone   Type of home: House   Stairs to enter the home:         Ramp: No   Stairs  inside the home: Yes 4 Is there a railing: Yes     Help at home: Self Cares (home health aide/personal care attendant, family, etc); Medication and/or finances  Equipment owned: Straight Cane; Walker with wheels     Employment: Not Applicable    Hobbies/Interests:      Patient goals for therapy: be alittle more active    Pain assessment:  Pain in my head. Tylenol it dhara goes. Points frontal. Took multiple tylenol today just before the appt. Occasional mid back pain. nO numbness or tingling.      Objective   Vitals Signs  Heart Rate: 76  Blood Pressure: 155/88 (left arm seated)  Cognitive Status Examination  Orientation:    Level of Consciousness: Alert  Follows Commands and Answers Questions: 100% of the time  Personal Safety and Judgement: Intact  Memory: Intact    OBSERVATION: obese  INTEGUMENTARY:   POSTURE:   PALPATION:   RANGE OF MOTION: CROM WNLS  STRENGTH:   BED MOBILITY: Independent  TRANSFERS: Independent  GAIT:   Level of Cobden: Independent  Assistive Device(s): None  Gait Deviations: WNL  Gait Distance:   Stairs:     SPECIAL TESTS  Functional Gait Assessment (FGA)      10 Meter Walk Test (Comfortable)  6.75 seconds and 12 steps   10 Meter Walk Test (Fast)  4.91 seconds and 11 steps   6 Minute Walk Test (6MWT)            Rivas Balance Scale (BBS)     5 Times Sit-to-Stand (5TSTS)  She can get out of an 18 inch ht chair without hands but I did not test this today     Dynamic Gait Index (DGI)   4 item DGI 10/12   Timed Up and Go (TUG) - sec 9.69 seconds and 9.94 seconds   Single Leg Stance Right (sec)    Single Leg Stance Left (sec)    Modified CTSIB Conditions (sec) Cond 1:   Cond 2:   Cond 4:   Cond 5 :    Romberg  (sec)    Sharpened Romberg (sec)    30 Second Sit to Stand (reps/height)    Mini-BESTest            SENSATION:  Denies numbness and tingling  REFLEXES:   COORDINATION:   MUSCLE TONE:        VESTIBULAR EVALUATION  ADDITIONAL HISTORY:  Description of symptoms: Off balance; Nausea or  vomiting; Attacks of dizziness; Light-headedness  Dizzy attacks:   Start: 5 years +   Last attack: this morning   Frequency of occurrences: everyday   Length of attack: started at 11:00 still feeling it  Difficulty hearing:    Noise in ears? No    Alleviates symptoms: sitting resting  Worsens symptoms:    Activities that bring on symptoms: Other  it just happens    Pertinent visual history:   Pertinent history of current vestibular problem:  denies migraines or motion sickness history  DHI: Total Score: 26    Seated head motion active horizontal 10 reps (she closes her eyes) with no symptoms provoked, 10 reps vertical also (closes eyes) with no symptoms provoked.    Infrared Goggle Exam Vestibular Suppressant in Last 24 Hours? Yes meclazine about an hour prior to appt  Exam Completed With: Infrared goggles   Spontaneous Nystagmus Negative   Gaze Evoked Nystagmus Negative, had stronger left beating end gaze nystagmus then right, tested a few times and it does dampen with reps   Head Shake Horizontal Nystagmus Negative   Positional Testing    Supine Head-Hanging Test     Left Right   Roca-Hallpike     Sidelying Test     [facility] Supine Roll Test Horizontal R, Horizontal L, She clearly has left beating with left gaze and right beating with right gaze. IT is repeatable    Also did this with full head sweep (180 degrees) and no symptoms Horizontal L  Clearly has left beating with gaze left, repeatable. NO symptoms    Also did this with full head sweep (180 degrees) and no symptoms   [facility] Forward Roll Test     Morris and Lean Test -  Sitting Erect    Morris and Lean Test - Seated, Head Bent 60 Degrees Forward    Morris and Lean Test - Seated, Head Bent Backwards       BPPV Canal(s):  Not fitting the criteria for HSCC today. No symptoms provoked but took meclazine. Prior PT sounds like positionals provoked her so need to test HSCC and PSCC at next appt without meclazine on board..   BPPV Type:       Assessment & Plan   CLINICAL  IMPRESSIONS  Medical Diagnosis: BPPV    Treatment Diagnosis: vertigo   Impression/Assessment: Patient is a 89 year old female with heavy head/faint/dizziness sensations. Balance/Gait looks pretty good. Unable to produce much for symptoms today. Possible because of meclazine and tylenol.  She has a hard time describing symptoms but feels tylenol does help her so she takes multiple pills, mulitple times a day.  BP is [facility] at [facility]. The following significant findings have been identified: Impaired balance, Impaired gait, and Dizziness. These impairments interfere with their ability to perform self care tasks, recreational activities, household chores, household mobility, and community mobility as compared to previous level of function.     Clinical Decision Making (Complexity):  Clinical Presentation: Evolving/Changing  Clinical Presentation Rationale: based on medical and personal factors listed in PT evaluation  Clinical Decision Making (Complexity): Moderate complexity (chronic issue, unusual nystagmus, needs more vestibular testing at future appts)    PLAN OF CARE  Treatment Interventions:  Interventions: Gait Training, Neuromuscular Re-education, Self-Care/Home Management, Canalith Repositioning    Long Term Goals     PT Goal 1  Goal Identifier: symptoms and meds  Goal Description: She will report that she only has to take tylenol once a day for her symptoms instead of multiple times a day.  Rationale: to maximize safety and independence with performance of ADLs and functional tasks;to maximize safety and independence within the home;to maximize safety and independence within the community;to maximize safety and independence with self cares  Target Date: 10/20/24  PT Goal 2  Goal Identifier: gait with head motion  Goal Description: she will be able to walk with head motion horizontal and vertical with no change in gait speed/quality  (3/3 on FGA)  Rationale: to maximize safety and independence with  performance of ADLs and functional tasks;to maximize safety and independence within the home;to maximize safety and independence within the community;to maximize safety and independence with self cares  Target Date: 10/20/24      Frequency of Treatment: 1x a week  Duration of Treatment: 12 weeks    Recommended Referrals to Other Professionals:  none  Education Assessment:   Learner/Method: Patient;Family;Listening (Janis ha present at [facility])    Risks and benefits of evaluation/treatment have been explained.   Patient/Family/caregiver agrees with Plan of Care.     Evaluation Time:     PT Rosalio, Moderate Complexity Minutes (94330): 45       Signing Clinician: Mindy Macedo PT, NCS        [facility]                                                                                   OUTPATIENT PHYSICAL THERAPY      PLAN OF TREATMENT FOR OUTPATIENT REHABILITATION   Patient's Last Name, First Name, Rosemarie Brewer YOB: 1934   Provider's Name   [facility]   Medical Record No.  [id number]     Onset Date: 06/12/24 (order date)  Start of Care Date: 07/29/24     Medical Diagnosis:  BPPV      PT Treatment Diagnosis:  vertigo Plan of Treatment  Frequency/Duration: 1x a week/ 12 weeks    Certification date from 07/29/24 to 10/20/24         See note for plan of treatment details and functional goals     Mindy Macedo, PT                         I CERTIFY THE NEED FOR THESE SERVICES FURNISHED UNDER        THIS PLAN OF TREATMENT AND WHILE UNDER MY CARE     (Physician attestation of this document indicates review and certification of the therapy plan).              Referring Provider:  Nya Vasquez    Initial Assessment  See Epic Evaluation- Start of Care Date: 07/29/24

## 2024-07-30 PROBLEM — H81.10 BENIGN PAROXYSMAL POSITIONAL VERTIGO, UNSPECIFIED LATERALITY: Status: ACTIVE | Noted: 2024-07-30

## 2024-08-13 ENCOUNTER — MYC REFILL (OUTPATIENT)
Dept: FAMILY MEDICINE | Facility: CLINIC | Age: 89
End: 2024-08-13
Payer: COMMERCIAL

## 2024-08-13 DIAGNOSIS — I48.20 CHRONIC ATRIAL FIBRILLATION (H): ICD-10-CM

## 2024-08-14 RX ORDER — METOPROLOL SUCCINATE 50 MG/1
50 TABLET, EXTENDED RELEASE ORAL DAILY
Qty: 90 TABLET | Refills: 1 | Status: SHIPPED | OUTPATIENT
Start: 2024-08-14

## 2024-08-19 ENCOUNTER — HOSPITAL ENCOUNTER (EMERGENCY)
Facility: HOSPITAL | Age: 89
Discharge: HOME | End: 2024-08-19
Attending: EMERGENCY MEDICINE | Admitting: EMERGENCY MEDICINE
Payer: COMMERCIAL

## 2024-08-19 ENCOUNTER — INFUSION THERAPY VISIT (OUTPATIENT)
Dept: INFUSION THERAPY | Facility: CLINIC | Age: 89
End: 2024-08-19
Attending: INTERNAL MEDICINE
Payer: COMMERCIAL

## 2024-08-19 ENCOUNTER — LAB (OUTPATIENT)
Dept: LAB | Facility: CLINIC | Age: 89
End: 2024-08-19
Payer: COMMERCIAL

## 2024-08-19 VITALS
DIASTOLIC BLOOD PRESSURE: 78 MMHG | SYSTOLIC BLOOD PRESSURE: 147 MMHG | TEMPERATURE: 97.8 F | HEART RATE: 62 BPM | WEIGHT: 165 LBS | OXYGEN SATURATION: 98 % | BODY MASS INDEX: 30.36 KG/M2 | RESPIRATION RATE: 22 BRPM | HEIGHT: 62 IN

## 2024-08-19 VITALS
HEART RATE: 57 BPM | SYSTOLIC BLOOD PRESSURE: 150 MMHG | WEIGHT: 164.8 LBS | HEIGHT: 62 IN | DIASTOLIC BLOOD PRESSURE: 76 MMHG | BODY MASS INDEX: 30.33 KG/M2 | RESPIRATION RATE: 14 BRPM

## 2024-08-19 DIAGNOSIS — E87.5 HYPERKALEMIA: ICD-10-CM

## 2024-08-19 DIAGNOSIS — R89.9 ABNORMAL LABORATORY TEST RESULT: ICD-10-CM

## 2024-08-19 DIAGNOSIS — M81.0 SENILE OSTEOPOROSIS: Primary | ICD-10-CM

## 2024-08-19 LAB
ANION GAP SERPL CALCULATED.3IONS-SCNC: 11 MMOL/L (ref 7–15)
ANION GAP SERPL CALCULATED.3IONS-SCNC: 7 MMOL/L (ref 7–15)
BASOPHILS # BLD AUTO: 0.1 10E3/UL (ref 0–0.2)
BASOPHILS NFR BLD AUTO: 1 %
BUN SERPL-MCNC: 28.8 MG/DL (ref 8–23)
BUN SERPL-MCNC: 31.8 MG/DL (ref 8–23)
CALCIUM SERPL-MCNC: 9.2 MG/DL (ref 8.8–10.4)
CALCIUM SERPL-MCNC: 9.5 MG/DL (ref 8.8–10.4)
CHLORIDE SERPL-SCNC: 106 MMOL/L (ref 98–107)
CHLORIDE SERPL-SCNC: 109 MMOL/L (ref 98–107)
CREAT SERPL-MCNC: 0.97 MG/DL (ref 0.51–0.95)
CREAT SERPL-MCNC: 1.13 MG/DL (ref 0.51–0.95)
EGFRCR SERPLBLD CKD-EPI 2021: 46 ML/MIN/1.73M2
EGFRCR SERPLBLD CKD-EPI 2021: 56 ML/MIN/1.73M2
EOSINOPHIL # BLD AUTO: 0.2 10E3/UL (ref 0–0.7)
EOSINOPHIL NFR BLD AUTO: 3 %
ERYTHROCYTE [DISTWIDTH] IN BLOOD BY AUTOMATED COUNT: 13.8 % (ref 10–15)
GLUCOSE SERPL-MCNC: 124 MG/DL (ref 70–99)
GLUCOSE SERPL-MCNC: 85 MG/DL (ref 70–99)
HCO3 SERPL-SCNC: 23 MMOL/L (ref 22–29)
HCO3 SERPL-SCNC: 24 MMOL/L (ref 22–29)
HCT VFR BLD AUTO: 34.4 % (ref 35–47)
HGB BLD-MCNC: 11.5 G/DL (ref 11.7–15.7)
HOLD SPECIMEN: NORMAL
IMM GRANULOCYTES # BLD: 0 10E3/UL
IMM GRANULOCYTES NFR BLD: 0 %
LYMPHOCYTES # BLD AUTO: 2.3 10E3/UL (ref 0.8–5.3)
LYMPHOCYTES NFR BLD AUTO: 38 %
MAGNESIUM SERPL-MCNC: 2.2 MG/DL (ref 1.7–2.3)
MCH RBC QN AUTO: 35.2 PG (ref 26.5–33)
MCHC RBC AUTO-ENTMCNC: 33.4 G/DL (ref 31.5–36.5)
MCV RBC AUTO: 105 FL (ref 78–100)
MONOCYTES # BLD AUTO: 0.5 10E3/UL (ref 0–1.3)
MONOCYTES NFR BLD AUTO: 8 %
NEUTROPHILS # BLD AUTO: 3.1 10E3/UL (ref 1.6–8.3)
NEUTROPHILS NFR BLD AUTO: 50 %
NRBC # BLD AUTO: 0 10E3/UL
NRBC BLD AUTO-RTO: 0 /100
PLATELET # BLD AUTO: 220 10E3/UL (ref 150–450)
POTASSIUM SERPL-SCNC: 5.1 MMOL/L (ref 3.4–5.3)
POTASSIUM SERPL-SCNC: 6.1 MMOL/L (ref 3.4–5.3)
POTASSIUM SERPL-SCNC: 6.4 MMOL/L (ref 3.4–5.3)
RBC # BLD AUTO: 3.27 10E6/UL (ref 3.8–5.2)
SODIUM SERPL-SCNC: 140 MMOL/L (ref 135–145)
SODIUM SERPL-SCNC: 140 MMOL/L (ref 135–145)
WBC # BLD AUTO: 6.1 10E3/UL (ref 4–11)

## 2024-08-19 PROCEDURE — 84132 ASSAY OF SERUM POTASSIUM: CPT | Mod: 91 | Performed by: INTERNAL MEDICINE

## 2024-08-19 PROCEDURE — 36415 COLL VENOUS BLD VENIPUNCTURE: CPT | Performed by: FAMILY MEDICINE

## 2024-08-19 PROCEDURE — 80048 BASIC METABOLIC PNL TOTAL CA: CPT | Performed by: FAMILY MEDICINE

## 2024-08-19 PROCEDURE — 99284 EMERGENCY DEPT VISIT MOD MDM: CPT | Performed by: EMERGENCY MEDICINE

## 2024-08-19 PROCEDURE — 83735 ASSAY OF MAGNESIUM: CPT | Performed by: EMERGENCY MEDICINE

## 2024-08-19 PROCEDURE — 93005 ELECTROCARDIOGRAM TRACING: CPT | Performed by: STUDENT IN AN ORGANIZED HEALTH CARE EDUCATION/TRAINING PROGRAM

## 2024-08-19 PROCEDURE — 85004 AUTOMATED DIFF WBC COUNT: CPT | Performed by: EMERGENCY MEDICINE

## 2024-08-19 PROCEDURE — 80048 BASIC METABOLIC PNL TOTAL CA: CPT | Performed by: INTERNAL MEDICINE

## 2024-08-19 PROCEDURE — 36415 COLL VENOUS BLD VENIPUNCTURE: CPT

## 2024-08-19 PROCEDURE — 93005 ELECTROCARDIOGRAM TRACING: CPT | Performed by: EMERGENCY MEDICINE

## 2024-08-19 RX ORDER — DIPHENHYDRAMINE HYDROCHLORIDE 50 MG/ML
50 INJECTION INTRAMUSCULAR; INTRAVENOUS
Start: 2025-08-19

## 2024-08-19 RX ORDER — HEPARIN SODIUM (PORCINE) LOCK FLUSH IV SOLN 100 UNIT/ML 100 UNIT/ML
5 SOLUTION INTRAVENOUS
OUTPATIENT
Start: 2025-08-19

## 2024-08-19 RX ORDER — ALBUTEROL SULFATE 0.83 MG/ML
2.5 SOLUTION RESPIRATORY (INHALATION)
OUTPATIENT
Start: 2025-08-19

## 2024-08-19 RX ORDER — HEPARIN SODIUM,PORCINE 10 UNIT/ML
5-20 VIAL (ML) INTRAVENOUS DAILY PRN
OUTPATIENT
Start: 2025-08-19

## 2024-08-19 RX ORDER — ZOLEDRONIC ACID 5 MG/100ML
5 INJECTION, SOLUTION INTRAVENOUS ONCE
Status: DISCONTINUED | OUTPATIENT
Start: 2024-08-19 | End: 2024-08-19

## 2024-08-19 RX ORDER — MEPERIDINE HYDROCHLORIDE 25 MG/ML
25 INJECTION INTRAMUSCULAR; INTRAVENOUS; SUBCUTANEOUS EVERY 30 MIN PRN
OUTPATIENT
Start: 2025-08-19

## 2024-08-19 RX ORDER — ZOLEDRONIC ACID 5 MG/100ML
5 INJECTION, SOLUTION INTRAVENOUS ONCE
Start: 2025-08-19

## 2024-08-19 RX ORDER — EPINEPHRINE 1 MG/ML
0.3 INJECTION, SOLUTION, CONCENTRATE INTRAVENOUS EVERY 5 MIN PRN
OUTPATIENT
Start: 2025-08-19

## 2024-08-19 RX ORDER — METHYLPREDNISOLONE SODIUM SUCCINATE 125 MG/2ML
125 INJECTION, POWDER, LYOPHILIZED, FOR SOLUTION INTRAMUSCULAR; INTRAVENOUS
Start: 2025-08-19

## 2024-08-19 RX ORDER — ALBUTEROL SULFATE 90 UG/1
1-2 AEROSOL, METERED RESPIRATORY (INHALATION)
Start: 2025-08-19

## 2024-08-19 ASSESSMENT — COLUMBIA-SUICIDE SEVERITY RATING SCALE - C-SSRS
1. IN THE PAST MONTH, HAVE YOU WISHED YOU WERE DEAD OR WISHED YOU COULD GO TO SLEEP AND NOT WAKE UP?: NO
2. HAVE YOU ACTUALLY HAD ANY THOUGHTS OF KILLING YOURSELF IN THE PAST MONTH?: NO
6. HAVE YOU EVER DONE ANYTHING, STARTED TO DO ANYTHING, OR PREPARED TO DO ANYTHING TO END YOUR LIFE?: NO

## 2024-08-19 ASSESSMENT — PAIN SCALES - GENERAL: PAINLEVEL: NO PAIN (0)

## 2024-08-19 ASSESSMENT — ACTIVITIES OF DAILY LIVING (ADL)
ADLS_ACUITY_SCORE: 35
ADLS_ACUITY_SCORE: 33

## 2024-08-19 NOTE — ED TRIAGE NOTES
Patient comes in with hyperkalemia. Was going to get osteoporosis medication and labs were drawn and K was 6.4  Patient reports she feel fine, though daughter reports that isn't true.      Triage Assessment (Adult)       Row Name 08/19/24 4442          Triage Assessment    Airway WDL WDL        Respiratory WDL    Respiratory WDL WDL        Skin Circulation/Temperature WDL    Skin Circulation/Temperature WDL WDL        Cardiac WDL    Cardiac WDL WDL        Peripheral/Neurovascular WDL    Peripheral Neurovascular WDL WDL        Cognitive/Neuro/Behavioral WDL    Cognitive/Neuro/Behavioral WDL WDL

## 2024-08-19 NOTE — PROGRESS NOTES
Infusion Nursing Note:  Rosemarie Fry presents today for Reclast.    Patient seen by provider today: No   present during visit today: Not Applicable.    Note: Labs drawn peripherally.      Intravenous Access:  Peripheral IV placed.    Treatment Conditions:  Results reviewed, labs did NOT meet treatment parameters: CrCl 32. Also, Rosemarie has critical Potassium level- specimen redrawn to validate. WENDY Swan has been in contact with provider.      Post Infusion Assessment:  N/A no infusion today.       Discharge Plan:   Patient's daughter in contact directly with Dr. Vasquez.  Plan is for patient's daughter Janis to transport Rosemarie via private car to [place].    Alba Noriega RN

## 2024-08-20 ENCOUNTER — PATIENT OUTREACH (OUTPATIENT)
Dept: INTERNAL MEDICINE | Facility: CLINIC | Age: 89
End: 2024-08-20
Payer: COMMERCIAL

## 2024-08-20 NOTE — TELEPHONE ENCOUNTER
Transitions of Care Outreach  Chief Complaint   Patient presents with    Hospital F/U       Most Recent Admission Date: 8/19/2024   Most Recent Admission Diagnosis:      Most Recent Discharge Date: 8/19/2024   Most Recent Discharge Diagnosis: Abnormal laboratory test result - R89.9     Transitions of Care Assessment    Discharge Assessment  How are you doing now that you are home?: Spoke with patient's daughter, Janis (CTC on file) to complete discharge assessment. Patient in Emergency Department for elevated lab result. No symptoms.  How are your symptoms? (Red Flag symptoms escalate to triage hotline per guidelines): Other  Do you know how to contact your clinic care team if you have future questions or changes to your health status? : Yes  Does the patient have their discharge instructions? : Yes  Does the patient have questions regarding their discharge instructions? : No  Were you started on any new medications or were there changes to any of your previous medications? : No  Does the patient have all of their medications?: Yes  Do you have questions regarding any of your medications? : No  Do you have all of your needed medical supplies or equipment (DME)?  (i.e. oxygen tank, CPAP, cane, etc.): Yes    Follow up Plan     Discharge Follow-Up  Discharge follow up appointment scheduled in alignment with recommended follow up timeframe or Transitions of Risk Category? (Low = within 30 days; Moderate= within 14 days; High= within 7 days): No  Patient's follow up appointment not scheduled: Patient declined scheduling support. Education on the importance of transitions of care follow up. Provided scheduling phone number.    Future Appointments   Date Time Provider Department Center   8/24/2024 11:00 AM Mindy Macedo PT [facility]   8/26/2024 11:15 AM [facility]   9/9/2024 12:45 PM Mindy Macedo PT [facility]   9/16/2024 12:45 PM Mindy Macedo PT [facility]   9/23/2024 12:45 PM  Mindy Macedo, PT [facility]   9/30/2024 12:45 PM Mindy Macedo, PT [facility]   10/7/2024 12:45 PM Mindy Macedo, PT [facility]   10/14/2024 12:45 PM Mindy aMcedo, PT [facility]       Outpatient Plan as outlined on AVS reviewed with patient.    For any urgent concerns, please contact our 24 hour nurse triage line: [phone] ([phone])       Anita Mc RN

## 2024-08-20 NOTE — DISCHARGE INSTRUCTIONS
Your potassium was normal here. No sign of high potassium or kidney failure on your labwork.    Please follow up with your primary care doctor in regards to blood pressure recheck and to recheck your basic metabolic panel next week.

## 2024-08-20 NOTE — ED PROVIDER NOTES
EMERGENCY DEPARTMENT ENCOUNTER      NAME: Rosemarie Fry  AGE: 89 year old female  YOB: 1934  MRN: [id number]  EVALUATION DATE & TIME: 8/19/2024  6:43 PM    PCP: Nya Vasquez    ED PROVIDER: Lucio Rand M.D.      Chief Complaint   Patient presents with    Abnormal Labs         FINAL IMPRESSION:  1. Abnormal laboratory test result          ED COURSE & MEDICAL DECISION MAKING:    Pertinent Labs & Imaging studies reviewed below.  All EKGs below represent my independent interpretation.     ED Course as of 08/19/24 2142   Mon Aug 19, 2024   1927 Patient is an 89-year-old woman who presents with elevated potassium results as an outpatient.  She was getting screened before getting an infusion today, and potassium was elevated at 6.1, rechecked at 6.4.  She reports feeling well, normal appetite, hydration, diet.  She is not on any blood pressure medications that are known to raise potassium.  Her potassium here was rechecked at 5.1.  This is quite a dramatic shift from where she was earlier today and this makes me very suspicious that the earlier results were hemolyzed.   1930 He has not received any IV fluids since then, and with a recheck of 5.1 and normal EKG and good GFR, I have no reason to suspect she had severe hyperkalemia earlier today.  Discharged with reassurance, primary care follow-up.       Additional ED Course Timestamps:  7:12 PM I met with the patient, obtained history, performed an initial exam, and discussed options and plan for diagnostics and treatment here in the ED.   7:21 PM I decided that the patient is ready for discharge.  9:03 PM The patient was discharged.    Medical Decision Making  Obtained supplemental history:Supplemental history obtained?: Documented in chart and Family Member/Significant Other  Reviewed external records: External records reviewed?: Documented in chart  Care impacted by chronic illness:Chronic Kidney Disease, Chronic Lung Disease, and Other: chronic  "atrial fibrilation  Care significantly affected by social determinants of health:N/A  Did you consider but not order tests?: Work up considered but not performed and documented in chart, if applicable  Did you interpret images independently?: Independent interpretation of ECG and images noted in documentation, when applicable.  Consultation discussion with other provider: Phone conversation with consultants will be documented in the ED Course      No MIPS measures identified.    MEDICATIONS GIVEN IN THE EMERGENCY:  Medications - No data to display      NEW PRESCRIPTIONS STARTED AT TODAY'S ER VISIT  Discharge Medication List as of 8/19/2024  7:31 PM             =================================================================    HPI    Rosemarie Fry is a 89 year old female who presents to this ED for evaluation of abnormal labs.    Per patient and patient's family member, she was supposed to have an infusion done today (08/19/2024) in [place], [place]. At the visit it was found that her potasium was to high and rechecked it thirty minutes later and it got even high. Thus they wanted her to present to the emergency department for further evaluation.    She has no pain and feels fine. Today she had been drinking water. She has experienced no urinary/bowel incontinence besides needing to wear a pad due to occasional urine dripping out but no major accidents were mentioned. It was noted that she takes amlodipine, xarelto, and metoprolol.    Chart review:  Per Chart Review, the patient was seen on 08/19/2024 at [facility] for an infusion therapy visit. She had labs drawn preipherally at this visit. It was found that she did not meet the treatment perameters due to her lab results. Her potasium was high and thus they wanted her to present to the emergency department for further evaluation.     VITALS:  BP (!) 147/78   Pulse 62   Temp 97.8  F (36.6  C)   Resp 22   Ht 1.575 m (5' 2\")   Wt 74.8 " kg (165 lb)   LMP  (LMP Unknown)   SpO2 98%   BMI 30.18 kg/m      PHYSICAL EXAM    Constitutional: Well developed, well nourished. Comfortable appearing.   HENT: Normocephalic, atraumatic, mucous membranes moist, nose normal  Eyes: PERRL, EOMI, conjunctiva normal, no discharge.  Neck- Supple, gross ROM intact.   Respiratory: Normal work of breathing, normal rate, speaks in full sentences  Cardiovascular: Normal heart rate  Musculoskeletal: Moving all 4 extremities intentionally and without pain.  Neurologic: Alert & oriented x 3, cranial nerves grossly intact. Normal gross coordination  Psychiatric: Affect normal, cooperative.       I, Jose Armando Marquise am serving as a scribe to document services personally performed by Dr. Lucio Rand based on my observation and the provider's statements to me. I, Lucio Rand MD attest that Jose Armando Camarillo is acting in a scribe capacity, has observed my performance of the services and has documented them in accordance with my direction.    Lucio Rand M.D.  Emergency Medicine  [facility] EMERGENCY DEPARTMENT  [facility] [address] [phone]  Dept: [phone]      Lucio Rand MD  08/20/24 8817

## 2024-08-21 LAB
ATRIAL RATE - MUSE: 62 BPM
DIASTOLIC BLOOD PRESSURE - MUSE: NORMAL MMHG
INTERPRETATION ECG - MUSE: NORMAL
P AXIS - MUSE: 56 DEGREES
PR INTERVAL - MUSE: 200 MS
QRS DURATION - MUSE: 122 MS
QT - MUSE: 434 MS
QTC - MUSE: 440 MS
R AXIS - MUSE: -48 DEGREES
SYSTOLIC BLOOD PRESSURE - MUSE: NORMAL MMHG
T AXIS - MUSE: 15 DEGREES
VENTRICULAR RATE- MUSE: 62 BPM

## 2024-08-22 ENCOUNTER — TRANSFERRED RECORDS (OUTPATIENT)
Dept: HEALTH INFORMATION MANAGEMENT | Facility: CLINIC | Age: 89
End: 2024-08-22
Payer: COMMERCIAL

## 2024-08-24 ENCOUNTER — THERAPY VISIT (OUTPATIENT)
Dept: PHYSICAL THERAPY | Facility: CLINIC | Age: 89
End: 2024-08-24
Attending: INTERNAL MEDICINE
Payer: COMMERCIAL

## 2024-08-24 DIAGNOSIS — H81.11 BENIGN PAROXYSMAL POSITIONAL VERTIGO, RIGHT: Primary | ICD-10-CM

## 2024-08-24 DIAGNOSIS — H81.10 BENIGN PAROXYSMAL POSITIONAL VERTIGO, UNSPECIFIED LATERALITY: ICD-10-CM

## 2024-08-24 PROCEDURE — 97112 NEUROMUSCULAR REEDUCATION: CPT | Mod: GP,59 | Performed by: PHYSICAL THERAPIST

## 2024-08-24 PROCEDURE — 95992 CANALITH REPOSITIONING PROC: CPT | Mod: GP | Performed by: PHYSICAL THERAPIST

## 2024-08-26 ENCOUNTER — LAB (OUTPATIENT)
Dept: LAB | Facility: CLINIC | Age: 89
End: 2024-08-26
Payer: COMMERCIAL

## 2024-08-26 ENCOUNTER — TRANSFERRED RECORDS (OUTPATIENT)
Dept: HEALTH INFORMATION MANAGEMENT | Facility: CLINIC | Age: 89
End: 2024-08-26

## 2024-08-26 ENCOUNTER — OFFICE VISIT (OUTPATIENT)
Dept: INTERNAL MEDICINE | Facility: CLINIC | Age: 89
End: 2024-08-26
Payer: COMMERCIAL

## 2024-08-26 VITALS
HEIGHT: 62 IN | TEMPERATURE: 97.7 F | HEART RATE: 67 BPM | SYSTOLIC BLOOD PRESSURE: 128 MMHG | WEIGHT: 164.2 LBS | BODY MASS INDEX: 30.22 KG/M2 | RESPIRATION RATE: 18 BRPM | OXYGEN SATURATION: 97 % | DIASTOLIC BLOOD PRESSURE: 74 MMHG

## 2024-08-26 DIAGNOSIS — E87.5 HYPERKALEMIA: Primary | ICD-10-CM

## 2024-08-26 DIAGNOSIS — E83.10 DISORDER OF IRON METABOLISM, UNSPECIFIED: ICD-10-CM

## 2024-08-26 DIAGNOSIS — R79.89 ELEVATED FERRITIN: Primary | ICD-10-CM

## 2024-08-26 DIAGNOSIS — E03.8 SUBCLINICAL HYPOTHYROIDISM: ICD-10-CM

## 2024-08-26 DIAGNOSIS — Z29.11 NEED FOR VACCINATION AGAINST RESPIRATORY SYNCYTIAL VIRUS: ICD-10-CM

## 2024-08-26 DIAGNOSIS — R79.9 ABNORMAL FINDING OF BLOOD CHEMISTRY, UNSPECIFIED: ICD-10-CM

## 2024-08-26 DIAGNOSIS — E87.5 HYPERKALEMIA: ICD-10-CM

## 2024-08-26 LAB
ANION GAP SERPL CALCULATED.3IONS-SCNC: 10 MMOL/L (ref 7–15)
BUN SERPL-MCNC: 31.8 MG/DL (ref 8–23)
CALCIUM SERPL-MCNC: 9.3 MG/DL (ref 8.8–10.4)
CHLORIDE SERPL-SCNC: 106 MMOL/L (ref 98–107)
CREAT SERPL-MCNC: 1.16 MG/DL (ref 0.51–0.95)
EGFRCR SERPLBLD CKD-EPI 2021: 45 ML/MIN/1.73M2
FERRITIN SERPL-MCNC: 1597 NG/ML (ref 11–328)
GLUCOSE SERPL-MCNC: 114 MG/DL (ref 70–99)
HCO3 SERPL-SCNC: 23 MMOL/L (ref 22–29)
IRON BINDING CAPACITY (ROCHE): 200 UG/DL (ref 240–430)
IRON SATN MFR SERPL: 47 % (ref 15–46)
IRON SERPL-MCNC: 94 UG/DL (ref 37–145)
POTASSIUM SERPL-SCNC: 5.1 MMOL/L (ref 3.4–5.3)
SODIUM SERPL-SCNC: 139 MMOL/L (ref 135–145)
TSH SERPL DL<=0.005 MIU/L-ACNC: 2.76 UIU/ML (ref 0.3–4.2)

## 2024-08-26 PROCEDURE — 83921 ORGANIC ACID SINGLE QUANT: CPT

## 2024-08-26 PROCEDURE — 99213 OFFICE O/P EST LOW 20 MIN: CPT | Performed by: INTERNAL MEDICINE

## 2024-08-26 PROCEDURE — 36415 COLL VENOUS BLD VENIPUNCTURE: CPT

## 2024-08-26 PROCEDURE — 80048 BASIC METABOLIC PNL TOTAL CA: CPT

## 2024-08-26 PROCEDURE — 82728 ASSAY OF FERRITIN: CPT

## 2024-08-26 PROCEDURE — G2211 COMPLEX E/M VISIT ADD ON: HCPCS | Performed by: INTERNAL MEDICINE

## 2024-08-26 PROCEDURE — 83550 IRON BINDING TEST: CPT

## 2024-08-26 PROCEDURE — 84443 ASSAY THYROID STIM HORMONE: CPT

## 2024-08-26 PROCEDURE — 83540 ASSAY OF IRON: CPT

## 2024-08-26 ASSESSMENT — PAIN SCALES - GENERAL: PAINLEVEL: MILD PAIN (2)

## 2024-08-26 NOTE — PROGRESS NOTES
"  Assessment & Plan     Need for vaccination against respiratory syncytial virus    - RSV vaccine, bivalent, ABRYSVO, injection; Inject 0.5 mLs into the muscle once for 1 dose. Pharmacist administered    Hyperkalemia  Noted incidentally before reclast shot . Repeat potassium was normal   She is now back to baseline   No medications that could trigger this   Does have chronic kidney disease   Concern was for occult malignancy or acute on chronic renal failure but she looks well today   Will continue to monitor  - Basic metabolic panel  (Ca, Cl, CO2, Creat, Gluc, K, Na, BUN); Future    Subclinical hypothyroidism    - TSH; Future        MED REC REQUIRED  Post Medication Reconciliation Status: discharge medications reconciled, continue medications without change  BMI  Estimated body mass index is 30.03 kg/m  as calculated from the following:    Height as of this encounter: 1.575 m (5' 2\").    Weight as of this encounter: 74.5 kg (164 lb 3.2 oz).             Jael Houston is a 89 year old, presenting for the following health issues:  Creatinine   Date Value Ref Range Status   08/26/2024 1.16 (H) 0.51 - 0.95 mg/dL Final   06/29/2020 0.88 0.52 - 1.04 mg/dL Final       Had elevated creatinine before reclast ,   Potassium   Date Value Ref Range Status   08/26/2024 5.1 3.4 - 5.3 mmol/L Final   07/06/2022 5.2 3.4 - 5.3 mmol/L Final   06/29/2020 4.3 3.4 - 5.3 mmol/L Final     8/19 was 6.1 then went up to 6.4 . With repeat check   She was also saying she wasn't feeling well .   Sent to ER and repeat check was 5.1   Hypertension and Recheck Medication (Pt reports that she is here for blood pressure follow up.)      8/26/2024    10:33 AM   Additional Questions   Roomed by minor   Accompanied by daughter         8/26/2024    10:33 AM   Patient Reported Additional Medications   Patient reports taking the following new medications none     History of Present Illness       Hypertension: She presents for follow up of " "hypertension.  She does not check blood pressure  regularly outside of the clinic. Outside blood pressures have been over 140/90. She does not follow a low salt diet.     Reason for visit:  Recheck Labs, blood pressure and see .    She eats 2-3 servings of fruits and vegetables daily.She consumes 1 sweetened beverage(s) daily.She exercises with enough effort to increase her heart rate 10 to 19 minutes per day.  She exercises with enough effort to increase her heart rate 4 days per week. She is missing 2 dose(s) of medications per week.  She is not taking prescribed medications regularly due to remembering to take and other.          Hospital Follow-up Visit:    Hospital/Nursing Home/IP Rehab Facility: [facility]  Date of Admission: 8/19/24  Date of Discharge: 8/19/24  Reason(s) for Admission: high potassium level  Was the patient in the ICU or did the patient experience delirium during hospitalization?  No  Do you have any other stressors you would like to discuss with your provider? No    Problems taking medications regularly:  None  Medication changes since discharge: None  Problems adhering to non-medication therapy:  None    Summary of hospitalization:  [facility] discharge summary reviewed  Diagnostic Tests/Treatments reviewed.  Follow up needed: none  Other Healthcare Providers Involved in Patient s Care:         None  Update since discharge: improved.         Plan of care communicated with patient                       Objective    /74 (BP Location: Left arm, Patient Position: Sitting, Cuff Size: Adult Large)   Pulse 67   Temp 97.7  F (36.5  C) (Axillary)   Resp 18   Ht 1.575 m (5' 2\")   Wt 74.5 kg (164 lb 3.2 oz)   LMP  (LMP Unknown)   SpO2 97%   Breastfeeding No   BMI 30.03 kg/m    Body mass index is 30.03 kg/m .  Physical Exam   GENERAL: alert and no distress  NECK: no adenopathy, no asymmetry, masses, or scars  RESP: lungs clear to auscultation - " no rales, rhonchi or wheezes  CV: regular rate and rhythm, normal S1 S2, no S3 or S4, no murmur, click or rub, no peripheral edema  ABDOMEN: soft, nontender, no hepatosplenomegaly, no masses and bowel sounds normal  MS: no gross musculoskeletal defects noted, no edema            Signed Electronically by: Nya Vasquez MD

## 2024-08-28 LAB — METHYLMALONATE SERPL-SCNC: 0.27 UMOL/L (ref 0–0.4)

## 2024-09-09 ENCOUNTER — THERAPY VISIT (OUTPATIENT)
Dept: PHYSICAL THERAPY | Facility: CLINIC | Age: 89
End: 2024-09-09
Attending: INTERNAL MEDICINE
Payer: COMMERCIAL

## 2024-09-09 DIAGNOSIS — H81.10 BENIGN PAROXYSMAL POSITIONAL VERTIGO, UNSPECIFIED LATERALITY: Primary | ICD-10-CM

## 2024-09-09 PROCEDURE — 97112 NEUROMUSCULAR REEDUCATION: CPT | Mod: GP | Performed by: PHYSICAL THERAPIST

## 2024-09-09 PROCEDURE — 97116 GAIT TRAINING THERAPY: CPT | Mod: GP | Performed by: PHYSICAL THERAPIST

## 2024-09-16 ENCOUNTER — THERAPY VISIT (OUTPATIENT)
Dept: PHYSICAL THERAPY | Facility: CLINIC | Age: 89
End: 2024-09-16
Attending: INTERNAL MEDICINE
Payer: COMMERCIAL

## 2024-09-16 DIAGNOSIS — H81.10 BENIGN PAROXYSMAL POSITIONAL VERTIGO, UNSPECIFIED LATERALITY: Primary | ICD-10-CM

## 2024-09-16 PROCEDURE — 97112 NEUROMUSCULAR REEDUCATION: CPT | Mod: GP | Performed by: PHYSICAL THERAPIST

## 2024-10-08 ENCOUNTER — LAB (OUTPATIENT)
Dept: LAB | Facility: CLINIC | Age: 89
End: 2024-10-08
Payer: COMMERCIAL

## 2024-10-08 DIAGNOSIS — R79.89 ELEVATED FERRITIN: ICD-10-CM

## 2024-10-08 DIAGNOSIS — E83.10 DISORDER OF IRON METABOLISM, UNSPECIFIED: ICD-10-CM

## 2024-10-08 LAB
BASOPHILS # BLD AUTO: 0.1 10E3/UL (ref 0–0.2)
BASOPHILS NFR BLD AUTO: 1 %
EOSINOPHIL # BLD AUTO: 0.1 10E3/UL (ref 0–0.7)
EOSINOPHIL NFR BLD AUTO: 2 %
ERYTHROCYTE [DISTWIDTH] IN BLOOD BY AUTOMATED COUNT: 13 % (ref 10–15)
ERYTHROCYTE [SEDIMENTATION RATE] IN BLOOD BY WESTERGREN METHOD: 29 MM/HR (ref 0–30)
HCT VFR BLD AUTO: 34.1 % (ref 35–47)
HGB BLD-MCNC: 11.4 G/DL (ref 11.7–15.7)
IMM GRANULOCYTES # BLD: 0 10E3/UL
IMM GRANULOCYTES NFR BLD: 0 %
LAB DIRECTOR COMMENTS: NORMAL
LAB DIRECTOR DISCLAIMER: NORMAL
LAB DIRECTOR INTERPRETATION: NORMAL
LAB DIRECTOR METHODOLOGY: NORMAL
LAB DIRECTOR RESULTS: NORMAL
LYMPHOCYTES # BLD AUTO: 2 10E3/UL (ref 0.8–5.3)
LYMPHOCYTES NFR BLD AUTO: 39 %
MCH RBC QN AUTO: 35.4 PG (ref 26.5–33)
MCHC RBC AUTO-ENTMCNC: 33.4 G/DL (ref 31.5–36.5)
MCV RBC AUTO: 106 FL (ref 78–100)
MONOCYTES # BLD AUTO: 0.6 10E3/UL (ref 0–1.3)
MONOCYTES NFR BLD AUTO: 13 %
NEUTROPHILS # BLD AUTO: 2.3 10E3/UL (ref 1.6–8.3)
NEUTROPHILS NFR BLD AUTO: 46 %
PLATELET # BLD AUTO: 215 10E3/UL (ref 150–450)
RBC # BLD AUTO: 3.22 10E6/UL (ref 3.8–5.2)
RETICS # AUTO: 0.04 10E6/UL
RETICS/RBC NFR AUTO: 1.3 %
SPECIMEN DESCRIPTION: NORMAL
WBC # BLD AUTO: 5.1 10E3/UL (ref 4–11)

## 2024-10-08 PROCEDURE — 85652 RBC SED RATE AUTOMATED: CPT

## 2024-10-08 PROCEDURE — 83921 ORGANIC ACID SINGLE QUANT: CPT

## 2024-10-08 PROCEDURE — 86140 C-REACTIVE PROTEIN: CPT

## 2024-10-08 PROCEDURE — 36415 COLL VENOUS BLD VENIPUNCTURE: CPT

## 2024-10-08 PROCEDURE — 83550 IRON BINDING TEST: CPT

## 2024-10-08 PROCEDURE — 83540 ASSAY OF IRON: CPT | Mod: 59

## 2024-10-08 PROCEDURE — 99207 BLOOD MORPHOLOGY PATHOLOGIST REVIEW: CPT | Performed by: PATHOLOGY

## 2024-10-08 PROCEDURE — G0452 MOLECULAR PATHOLOGY INTERPR: HCPCS | Performed by: PATHOLOGY

## 2024-10-08 PROCEDURE — 82728 ASSAY OF FERRITIN: CPT

## 2024-10-08 PROCEDURE — 85025 COMPLETE CBC W/AUTO DIFF WBC: CPT

## 2024-10-08 PROCEDURE — 80053 COMPREHEN METABOLIC PANEL: CPT

## 2024-10-08 PROCEDURE — 85045 AUTOMATED RETICULOCYTE COUNT: CPT

## 2024-10-08 PROCEDURE — 81256 HFE GENE: CPT | Mod: GZ

## 2024-10-09 LAB
ALBUMIN SERPL BCG-MCNC: 4.1 G/DL (ref 3.5–5.2)
ALP SERPL-CCNC: 92 U/L (ref 40–150)
ALT SERPL W P-5'-P-CCNC: 15 U/L (ref 0–50)
ANION GAP SERPL CALCULATED.3IONS-SCNC: 11 MMOL/L (ref 7–15)
AST SERPL W P-5'-P-CCNC: 18 U/L (ref 0–45)
BILIRUB SERPL-MCNC: 0.3 MG/DL
BUN SERPL-MCNC: 26 MG/DL (ref 8–23)
CALCIUM SERPL-MCNC: 9.1 MG/DL (ref 8.8–10.4)
CHLORIDE SERPL-SCNC: 108 MMOL/L (ref 98–107)
CREAT SERPL-MCNC: 1.06 MG/DL (ref 0.51–0.95)
CRP SERPL-MCNC: 3.06 MG/L
EGFRCR SERPLBLD CKD-EPI 2021: 50 ML/MIN/1.73M2
FERRITIN SERPL-MCNC: 1017 NG/ML (ref 11–328)
GLUCOSE SERPL-MCNC: 108 MG/DL (ref 70–99)
HCO3 SERPL-SCNC: 23 MMOL/L (ref 22–29)
IRON BINDING CAPACITY (ROCHE): 216 UG/DL (ref 240–430)
IRON SATN MFR SERPL: 47 % (ref 15–46)
IRON SERPL-MCNC: 102 UG/DL (ref 37–145)
METHYLMALONATE SERPL-SCNC: 0.28 UMOL/L (ref 0–0.4)
POTASSIUM SERPL-SCNC: 4.4 MMOL/L (ref 3.4–5.3)
PROT SERPL-MCNC: 7 G/DL (ref 6.4–8.3)
SODIUM SERPL-SCNC: 142 MMOL/L (ref 135–145)
TRANSFERRIN SERPL-MCNC: 166 MG/DL (ref 200–360)

## 2024-10-10 LAB
PATH REPORT.COMMENTS IMP SPEC: NORMAL
PATH REPORT.COMMENTS IMP SPEC: NORMAL
PATH REPORT.FINAL DX SPEC: NORMAL
PATH REPORT.MICROSCOPIC SPEC OTHER STN: NORMAL
PATH REPORT.MICROSCOPIC SPEC OTHER STN: NORMAL
PATH REPORT.RELEVANT HX SPEC: NORMAL

## 2024-10-28 ENCOUNTER — TRANSFERRED RECORDS (OUTPATIENT)
Dept: HEALTH INFORMATION MANAGEMENT | Facility: CLINIC | Age: 89
End: 2024-10-28
Payer: COMMERCIAL

## 2024-12-18 ENCOUNTER — TELEPHONE (OUTPATIENT)
Dept: VASCULAR SURGERY | Facility: CLINIC | Age: 89
End: 2024-12-18
Payer: COMMERCIAL

## 2024-12-18 NOTE — TELEPHONE ENCOUNTER
[facility] around March. [phone]    *CT should be a few days prior to seeing Yue*  ________________________________________  Kerline Beckett RN  [facility]  Please call to schedule CT and clinic follow up in 1 year. Thank you

## 2025-03-10 ENCOUNTER — TELEPHONE (OUTPATIENT)
Dept: VASCULAR SURGERY | Facility: CLINIC | Age: OVER 89
End: 2025-03-10

## 2025-03-10 ENCOUNTER — ANCILLARY PROCEDURE (OUTPATIENT)
Dept: CT IMAGING | Facility: CLINIC | Age: OVER 89
End: 2025-03-10
Attending: PHYSICIAN ASSISTANT
Payer: COMMERCIAL

## 2025-03-10 DIAGNOSIS — I71.40 ABDOMINAL AORTIC ANEURYSM (AAA) 3.0 CM TO 5.0 CM IN DIAMETER IN FEMALE: ICD-10-CM

## 2025-03-10 PROCEDURE — 74176 CT ABD & PELVIS W/O CONTRAST: CPT | Performed by: STUDENT IN AN ORGANIZED HEALTH CARE EDUCATION/TRAINING PROGRAM

## 2025-03-10 NOTE — TELEPHONE ENCOUNTER
LMTCB to r/s 3/27 appt with Dr. Brody or Jaylan. [phone]  ______________________________________  ----- Message from Rosangela MIR sent at 3/10/2025  1:32 PM CDT -----  Regarding: FW: Follow Up  Please reschedule patient to see one of the surgeons.    Thanks!  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    Thanks  ----- Message -----  From: Belén Radijacy Ib  Sent: 3/10/2025  11:37 AM CDT  To: Yue Salter PA-C

## 2025-03-13 PROBLEM — I77.810 ASCENDING AORTA DILATION: Status: ACTIVE | Noted: 2025-03-13

## 2025-03-13 PROBLEM — E03.9 HYPOTHYROIDISM: Status: ACTIVE | Noted: 2025-03-13

## 2025-03-13 PROBLEM — R60.0 BILATERAL LOWER EXTREMITY EDEMA: Status: ACTIVE | Noted: 2025-03-13

## 2025-03-14 NOTE — PROGRESS NOTES
How Severe Are Your Spot(S)?: mild Rosemarie Fry was evaluated at [facility] on December 8, 2022 at which time her blood pressure was:    BP Readings from Last 3 Encounters:   12/08/22 100/60   11/01/22 (!) 123/95   10/19/22 96/47     Pulse Readings from Last 3 Encounters:   11/01/22 (!) 131   10/19/22 70   09/19/22 54       Reviewed lifestyle modifications for blood pressure control and reduction: including making healthy food choices, managing weight, getting regular exercise, smoking cessation, reducing alcohol consumption, monitoring blood pressure regularly.     Symptoms: Dizziness    BP Goal:< 140/90 mmHg    BP Assessment:  BP at goal    Potential Reasons for BP too high: NA - Not applicable    BP Follow-Up Plan: Recheck BP in 6 months at pharmacy    Recommendation to Provider: I spoke with Rosemarie about making sure her blood pressure does not drop too low, as she was complaining about general dizziness and not feeling well in the mornings before eating.  She states that she feels better after she has a bowl of soup.  I advised her to make sure her blood pressure was not dropping too low.    Note completed by: Jennifer Castillo, PharmD   Have Your Spot(S) Been Treated In The Past?: has not been treated Hpi Title: Evaluation of Skin Lesions

## 2025-03-21 NOTE — PROGRESS NOTES
Vascular Clinic Rooming Questions      Patient is here for 1 year follow up  for Abdominal Aortic Aneurysm.      /73   Pulse 76   LMP  (LMP Unknown)   SpO2 97%     The provider has been notified that the patient has no concerns.     Questions patient would like addressed today are: N/A.    Refills are needed: N/A    Has homecare services and agency name:  No

## 2025-03-24 ENCOUNTER — OFFICE VISIT (OUTPATIENT)
Dept: VASCULAR SURGERY | Facility: CLINIC | Age: OVER 89
End: 2025-03-24
Attending: STUDENT IN AN ORGANIZED HEALTH CARE EDUCATION/TRAINING PROGRAM
Payer: COMMERCIAL

## 2025-03-24 VITALS — HEART RATE: 76 BPM | SYSTOLIC BLOOD PRESSURE: 133 MMHG | OXYGEN SATURATION: 97 % | DIASTOLIC BLOOD PRESSURE: 73 MMHG

## 2025-03-24 DIAGNOSIS — R09.89 OTHER SPECIFIED SYMPTOMS AND SIGNS INVOLVING THE CIRCULATORY AND RESPIRATORY SYSTEMS: ICD-10-CM

## 2025-03-24 DIAGNOSIS — I71.40 ABDOMINAL AORTIC ANEURYSM (AAA) 3.0 CM TO 5.0 CM IN DIAMETER IN FEMALE: Primary | ICD-10-CM

## 2025-03-24 PROCEDURE — 1126F AMNT PAIN NOTED NONE PRSNT: CPT | Performed by: STUDENT IN AN ORGANIZED HEALTH CARE EDUCATION/TRAINING PROGRAM

## 2025-03-24 PROCEDURE — 3078F DIAST BP <80 MM HG: CPT | Performed by: STUDENT IN AN ORGANIZED HEALTH CARE EDUCATION/TRAINING PROGRAM

## 2025-03-24 PROCEDURE — G0463 HOSPITAL OUTPT CLINIC VISIT: HCPCS | Performed by: STUDENT IN AN ORGANIZED HEALTH CARE EDUCATION/TRAINING PROGRAM

## 2025-03-24 PROCEDURE — 3075F SYST BP GE 130 - 139MM HG: CPT | Performed by: STUDENT IN AN ORGANIZED HEALTH CARE EDUCATION/TRAINING PROGRAM

## 2025-03-24 PROCEDURE — 99213 OFFICE O/P EST LOW 20 MIN: CPT | Performed by: STUDENT IN AN ORGANIZED HEALTH CARE EDUCATION/TRAINING PROGRAM

## 2025-03-24 RX ORDER — ASPIRIN 81 MG/1
81 TABLET, CHEWABLE ORAL DAILY
Qty: 90 TABLET | Refills: 3 | Status: SHIPPED | OUTPATIENT
Start: 2025-03-24

## 2025-03-24 RX ORDER — ATORVASTATIN CALCIUM 20 MG/1
20 TABLET, FILM COATED ORAL DAILY
Qty: 90 TABLET | Refills: 3 | Status: SHIPPED | OUTPATIENT
Start: 2025-03-24

## 2025-03-24 ASSESSMENT — PAIN SCALES - GENERAL: PAINLEVEL_OUTOF10: NO PAIN (0)

## 2025-03-24 NOTE — PATIENT INSTRUCTIONS
This is the plan that was discussed at your appointment.    We will contact you to schedule a popliteal artery ultrasound of your legs to check for any aneurysm  Begin aspirin 81mg daily  Begin atorvastatin 20mg daily  Return in 1 year with repeat CTA scan, we will contact you to schedule        I am including additional information on these things and our contact information if you have any questions or concerns.   Please do not hesitate to reach out to us if you felt we did not answer your questions or you are unsure of the treatment plan after your visit today. Our number is [phone].  Thank you for trusting us with your care.         Again thank you for your time.

## 2025-03-24 NOTE — Clinical Note
Patient needs us limited duplex BLE in the next 1-2 months, and 1 year follow-up with Dr. Brody and CT abd/pelvis non contrast

## 2025-03-24 NOTE — PROGRESS NOTES
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 She is accompanied by her daughter today. C/o back pain related to degenerative disc disease, has some lightheadedness when the pain hits. Denies other back/abd/chest pain, SOB, palpitations, claudication, or wounds. She was complaining of dizziness during her appt 1 year ago, states that her dizziness has improved in frequency since then. Denies any other changes in her health or medications over the past year. She quit smoking 20 years ago and is complaint with her medications, although she is not on aspirin or a statin and is unsure why. She lives by herself and is very independent, although her daughter does come to see her every day.     Exam:   /73   Pulse 76   LMP  (LMP Unknown)   SpO2 97%    Gen: NAD, AAO  CV: RRR  Pulm: Non-labored on RA  Abd: Soft, ND. Mild periumbilical tenderness with deep palpation.  Groins: Soft BL, no wounds or rashes.  Neuro: SILT and 5/5 strength in all extremities.  Ext: feet WWP, CR<2, no wounds  Vasc: 2+ fem/DP/PT pulses BL. Increased impulse felt with palpation of popliteal pulses bilaterally.     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 Non-con CT A/P on 3/10/25 shows that the aneurysm currently measures 29.7x19.8 mm by my measurements, no significant change in size from 1 year ago. Of note, she has never had any imaging of her popliteal arteries, and her exam was suggestive of possible popliteal artery aneurysms.     - Continue risk factor modification - ASA 81 mg and atorvastatin  prescribed today.  - Last LDL was 91 in 5/2024, needs updated lipid panel.   - Arterial duplex US of BLE ordered to evaluate for popliteal artery aneurysms.  - RTC 1 year with CTA C/A/P - needs to be a contrasted scan.     Staff: Dr. Ronn Jalloh MD  PGY-6  Vascular Surgery  Pager: [phone]    Please page me directly with any questions/concerns during regular weekday hours before 5PM. If there is no response, if it is a weekend, or if it is during evening hours, then please use the [employer] system to page the first-call resident on call for vascular surgery.

## 2025-04-21 ENCOUNTER — PATIENT OUTREACH (OUTPATIENT)
Dept: CARE COORDINATION | Facility: CLINIC | Age: OVER 89
End: 2025-04-21
Payer: COMMERCIAL

## 2025-05-28 DIAGNOSIS — I48.20 CHRONIC ATRIAL FIBRILLATION (H): ICD-10-CM

## 2025-05-28 DIAGNOSIS — R03.0 ELEVATED BLOOD PRESSURE READING WITHOUT DIAGNOSIS OF HYPERTENSION: ICD-10-CM

## 2025-05-29 DIAGNOSIS — I48.20 CHRONIC ATRIAL FIBRILLATION (H): ICD-10-CM

## 2025-05-29 RX ORDER — AMLODIPINE BESYLATE 2.5 MG/1
2.5 TABLET ORAL DAILY
Qty: 90 TABLET | Refills: 2 | Status: SHIPPED | OUTPATIENT
Start: 2025-05-29

## 2025-05-29 RX ORDER — RIVAROXABAN 15 MG/1
TABLET, FILM COATED ORAL
Qty: 90 TABLET | Refills: 2 | Status: SHIPPED | OUTPATIENT
Start: 2025-05-29

## 2025-05-29 RX ORDER — METOPROLOL SUCCINATE 50 MG/1
50 TABLET, EXTENDED RELEASE ORAL DAILY
Qty: 90 TABLET | Refills: 0 | Status: SHIPPED | OUTPATIENT
Start: 2025-05-29

## 2025-05-29 NOTE — TELEPHONE ENCOUNTER
Follow up order extended. Message sent to scheduling to arrange overdue follow up. Renewal sent to bridge patient in the interim.

## 2025-06-16 ENCOUNTER — OFFICE VISIT (OUTPATIENT)
Dept: INTERNAL MEDICINE | Facility: CLINIC | Age: OVER 89
End: 2025-06-16
Payer: COMMERCIAL

## 2025-06-16 VITALS
SYSTOLIC BLOOD PRESSURE: 116 MMHG | HEART RATE: 77 BPM | WEIGHT: 156.2 LBS | TEMPERATURE: 97.8 F | OXYGEN SATURATION: 97 % | DIASTOLIC BLOOD PRESSURE: 60 MMHG | HEIGHT: 62 IN | RESPIRATION RATE: 16 BRPM | BODY MASS INDEX: 28.74 KG/M2

## 2025-06-16 DIAGNOSIS — G31.84 MILD COGNITIVE IMPAIRMENT: ICD-10-CM

## 2025-06-16 DIAGNOSIS — I48.20 CHRONIC ATRIAL FIBRILLATION (H): ICD-10-CM

## 2025-06-16 DIAGNOSIS — I77.810 ASCENDING AORTA DILATION: ICD-10-CM

## 2025-06-16 DIAGNOSIS — E55.9 VITAMIN D DEFICIENCY: ICD-10-CM

## 2025-06-16 DIAGNOSIS — E23.6: ICD-10-CM

## 2025-06-16 DIAGNOSIS — E78.2 MIXED HYPERLIPIDEMIA: ICD-10-CM

## 2025-06-16 DIAGNOSIS — R79.9 ABNORMAL BLOOD CHEMISTRY: ICD-10-CM

## 2025-06-16 DIAGNOSIS — M81.0 SENILE OSTEOPOROSIS: ICD-10-CM

## 2025-06-16 DIAGNOSIS — J44.9 CHRONIC OBSTRUCTIVE PULMONARY DISEASE, UNSPECIFIED COPD TYPE (H): ICD-10-CM

## 2025-06-16 DIAGNOSIS — J30.1 SEASONAL ALLERGIC RHINITIS DUE TO POLLEN: ICD-10-CM

## 2025-06-16 DIAGNOSIS — E03.8 SUBCLINICAL HYPOTHYROIDISM: ICD-10-CM

## 2025-06-16 DIAGNOSIS — I71.40 ABDOMINAL AORTIC ANEURYSM (AAA) 3.0 CM TO 5.0 CM IN DIAMETER IN FEMALE: ICD-10-CM

## 2025-06-16 DIAGNOSIS — Z23 NEED FOR VACCINATION: ICD-10-CM

## 2025-06-16 DIAGNOSIS — K21.9 GASTROESOPHAGEAL REFLUX DISEASE WITHOUT ESOPHAGITIS: ICD-10-CM

## 2025-06-16 DIAGNOSIS — N18.31 CHRONIC KIDNEY DISEASE, STAGE 3A (H): ICD-10-CM

## 2025-06-16 DIAGNOSIS — H81.10 BENIGN PAROXYSMAL POSITIONAL VERTIGO, UNSPECIFIED LATERALITY: ICD-10-CM

## 2025-06-16 DIAGNOSIS — Z13.6 SCREENING FOR CARDIOVASCULAR CONDITION: Primary | ICD-10-CM

## 2025-06-16 DIAGNOSIS — R68.89 FULLNESS IN HEAD: ICD-10-CM

## 2025-06-16 PROBLEM — R42 VERTIGO: Status: RESOLVED | Noted: 2024-05-20 | Resolved: 2025-06-16

## 2025-06-16 PROBLEM — E03.9 HYPOTHYROIDISM: Status: RESOLVED | Noted: 2025-03-13 | Resolved: 2025-06-16

## 2025-06-16 LAB
ALBUMIN SERPL BCG-MCNC: 4.2 G/DL (ref 3.5–5.2)
ALP SERPL-CCNC: 95 U/L (ref 40–150)
ALT SERPL W P-5'-P-CCNC: 13 U/L (ref 0–50)
ANION GAP SERPL CALCULATED.3IONS-SCNC: 10 MMOL/L (ref 7–15)
AST SERPL W P-5'-P-CCNC: 19 U/L (ref 0–45)
BASOPHILS # BLD AUTO: 0 10E3/UL (ref 0–0.2)
BASOPHILS NFR BLD AUTO: 1 %
BILIRUB SERPL-MCNC: 0.3 MG/DL
BUN SERPL-MCNC: 28.8 MG/DL (ref 8–23)
CALCIUM SERPL-MCNC: 9.8 MG/DL (ref 8.8–10.4)
CHLORIDE SERPL-SCNC: 109 MMOL/L (ref 98–107)
CHOLEST SERPL-MCNC: 184 MG/DL
CREAT SERPL-MCNC: 1.11 MG/DL (ref 0.51–0.95)
CREAT UR-MCNC: 105 MG/DL
CRP SERPL-MCNC: 5.84 MG/L
EGFRCR SERPLBLD CKD-EPI 2021: 47 ML/MIN/1.73M2
EOSINOPHIL # BLD AUTO: 0.4 10E3/UL (ref 0–0.7)
EOSINOPHIL NFR BLD AUTO: 7 %
ERYTHROCYTE [DISTWIDTH] IN BLOOD BY AUTOMATED COUNT: 13.7 % (ref 10–15)
ERYTHROCYTE [SEDIMENTATION RATE] IN BLOOD BY WESTERGREN METHOD: 32 MM/HR (ref 0–30)
EST. AVERAGE GLUCOSE BLD GHB EST-MCNC: 114 MG/DL
FASTING STATUS PATIENT QL REPORTED: NO
FASTING STATUS PATIENT QL REPORTED: NO
GLUCOSE SERPL-MCNC: 90 MG/DL (ref 70–99)
HBA1C MFR BLD: 5.6 % (ref 0–5.6)
HCO3 SERPL-SCNC: 24 MMOL/L (ref 22–29)
HCT VFR BLD AUTO: 34.7 % (ref 35–47)
HDLC SERPL-MCNC: 41 MG/DL
HGB BLD-MCNC: 11.3 G/DL (ref 11.7–15.7)
IMM GRANULOCYTES # BLD: 0 10E3/UL
IMM GRANULOCYTES NFR BLD: 0 %
LDLC SERPL CALC-MCNC: 106 MG/DL
LYMPHOCYTES # BLD AUTO: 2 10E3/UL (ref 0.8–5.3)
LYMPHOCYTES NFR BLD AUTO: 35 %
MCH RBC QN AUTO: 33.8 PG (ref 26.5–33)
MCHC RBC AUTO-ENTMCNC: 32.6 G/DL (ref 31.5–36.5)
MCV RBC AUTO: 104 FL (ref 78–100)
MICROALBUMIN UR-MCNC: 32.6 MG/L
MICROALBUMIN/CREAT UR: 31.05 MG/G CR (ref 0–25)
MONOCYTES # BLD AUTO: 0.6 10E3/UL (ref 0–1.3)
MONOCYTES NFR BLD AUTO: 11 %
NEUTROPHILS # BLD AUTO: 2.7 10E3/UL (ref 1.6–8.3)
NEUTROPHILS NFR BLD AUTO: 46 %
NONHDLC SERPL-MCNC: 143 MG/DL
PLATELET # BLD AUTO: 228 10E3/UL (ref 150–450)
POTASSIUM SERPL-SCNC: 5 MMOL/L (ref 3.4–5.3)
PROLACTIN SERPL 3RD IS-MCNC: 42 NG/ML (ref 5–23)
PROT SERPL-MCNC: 7.1 G/DL (ref 6.4–8.3)
RBC # BLD AUTO: 3.34 10E6/UL (ref 3.8–5.2)
SODIUM SERPL-SCNC: 143 MMOL/L (ref 135–145)
TRIGL SERPL-MCNC: 184 MG/DL
TSH SERPL DL<=0.005 MIU/L-ACNC: 7.53 UIU/ML (ref 0.3–4.2)
VIT D+METAB SERPL-MCNC: 38 NG/ML (ref 20–50)
WBC # BLD AUTO: 5.8 10E3/UL (ref 4–11)

## 2025-06-16 PROCEDURE — 3044F HG A1C LEVEL LT 7.0%: CPT | Performed by: INTERNAL MEDICINE

## 2025-06-16 PROCEDURE — 82043 UR ALBUMIN QUANTITATIVE: CPT | Performed by: INTERNAL MEDICINE

## 2025-06-16 PROCEDURE — 3074F SYST BP LT 130 MM HG: CPT | Performed by: INTERNAL MEDICINE

## 2025-06-16 PROCEDURE — 99213 OFFICE O/P EST LOW 20 MIN: CPT | Mod: 25 | Performed by: INTERNAL MEDICINE

## 2025-06-16 PROCEDURE — 3078F DIAST BP <80 MM HG: CPT | Performed by: INTERNAL MEDICINE

## 2025-06-16 PROCEDURE — 86140 C-REACTIVE PROTEIN: CPT | Performed by: INTERNAL MEDICINE

## 2025-06-16 PROCEDURE — 85025 COMPLETE CBC W/AUTO DIFF WBC: CPT | Performed by: INTERNAL MEDICINE

## 2025-06-16 PROCEDURE — G0439 PPPS, SUBSEQ VISIT: HCPCS | Performed by: INTERNAL MEDICINE

## 2025-06-16 PROCEDURE — 3049F LDL-C 100-129 MG/DL: CPT | Performed by: INTERNAL MEDICINE

## 2025-06-16 PROCEDURE — 82570 ASSAY OF URINE CREATININE: CPT | Performed by: INTERNAL MEDICINE

## 2025-06-16 PROCEDURE — 80061 LIPID PANEL: CPT | Performed by: INTERNAL MEDICINE

## 2025-06-16 PROCEDURE — 85652 RBC SED RATE AUTOMATED: CPT | Performed by: INTERNAL MEDICINE

## 2025-06-16 PROCEDURE — 36415 COLL VENOUS BLD VENIPUNCTURE: CPT | Performed by: INTERNAL MEDICINE

## 2025-06-16 PROCEDURE — 83036 HEMOGLOBIN GLYCOSYLATED A1C: CPT | Performed by: INTERNAL MEDICINE

## 2025-06-16 PROCEDURE — 84146 ASSAY OF PROLACTIN: CPT | Performed by: INTERNAL MEDICINE

## 2025-06-16 PROCEDURE — 84443 ASSAY THYROID STIM HORMONE: CPT | Performed by: INTERNAL MEDICINE

## 2025-06-16 PROCEDURE — 82306 VITAMIN D 25 HYDROXY: CPT | Performed by: INTERNAL MEDICINE

## 2025-06-16 PROCEDURE — 80053 COMPREHEN METABOLIC PANEL: CPT | Performed by: INTERNAL MEDICINE

## 2025-06-16 RX ORDER — FEXOFENADINE HCL 180 MG/1
180 TABLET ORAL DAILY
Qty: 90 TABLET | Refills: 3 | Status: SHIPPED | OUTPATIENT
Start: 2025-06-16

## 2025-06-16 RX ORDER — ATENOLOL 50 MG/1
50 TABLET ORAL DAILY
Qty: 90 TABLET | Refills: 2 | Status: SHIPPED | OUTPATIENT
Start: 2025-06-16

## 2025-06-16 SDOH — HEALTH STABILITY: PHYSICAL HEALTH: ON AVERAGE, HOW MANY MINUTES DO YOU ENGAGE IN EXERCISE AT THIS LEVEL?: 40 MIN

## 2025-06-16 SDOH — HEALTH STABILITY: PHYSICAL HEALTH: ON AVERAGE, HOW MANY DAYS PER WEEK DO YOU ENGAGE IN MODERATE TO STRENUOUS EXERCISE (LIKE A BRISK WALK)?: 3 DAYS

## 2025-06-16 ASSESSMENT — SOCIAL DETERMINANTS OF HEALTH (SDOH): HOW OFTEN DO YOU GET TOGETHER WITH FRIENDS OR RELATIVES?: TWICE A WEEK

## 2025-06-16 NOTE — PATIENT INSTRUCTIONS
She needs to take   Xarelto once a day to prevent stroke   Thyroid pill   Metoprolol   Allegra for allergies over the counter   STOP atorvastatin   STOP amlodipine    STOP aspirin   RSV vaccine at the pharmacy     IV reclast for osteoporosis

## 2025-06-16 NOTE — PROGRESS NOTES
Preventive Care Visit  [facility]  Nya Vasquez MD, Internal Medicine  Jun 16, 2025      Assessment & Plan     Need for vaccination    - RSV vaccine, bivalent, ABRYSVO, injection; Inject 0.5 mLs into the muscle once for 1 dose. Pharmacist administered    Screening for cardiovascular condition      Chronic kidney disease, stage 3a (H)  GFR Estimate   Date Value Ref Range Status   06/16/2025 47 (L) >60 mL/min/1.73m2 Final     Comment:     eGFR calculated using 2021 CKD-EPI equation.   10/08/2024 50 (L) >60 mL/min/1.73m2 Final     Comment:     eGFR calculated using 2021 CKD-EPI equation.   08/26/2024 45 (L) >60 mL/min/1.73m2 Final     Comment:     eGFR calculated using 2021 CKD-EPI equation.   06/29/2020 60 (L) >60 mL/min/[1.73_m2] Final     Comment:     Non  GFR Calc  Starting 12/18/2018, serum creatinine based estimated GFR (eGFR) will be   calculated using the Chronic Kidney Disease Epidemiology Collaboration   (CKD-EPI) equation.     11/26/2018 57 (L) >60 mL/min/1.7m2 Final     Comment:     Non  GFR Calc   05/21/2018 63 >60 mL/min/1.7m2 Final     Comment:     Non  GFR Calc     GFR, ESTIMATED POCT   Date Value Ref Range Status   02/13/2023 43 (L) >60 mL/min/1.73m2 Final   11/03/2022 44 (L) >60 mL/min/1.73m2 Final   01/17/2022 54 (L) >60 mL/min/1.73m2 Final     Stable   - Albumin Random Urine Quantitative with Creat Ratio; Future    Hyperplasia of anterior pituitary  Noted on MRI . Reasonable to get repeat MRI . She feels she has fullness in her head and it doesn't feel right . Symptoms are non specific and could represent allergies and sinus   - MR Brain w/o Contrast; Future    Benign paroxysmal positional vertigo, unspecified laterality  Still has symptoms and uses meclizine     Seasonal allergic rhinitis due to pollen    - fexofenadine (ALLEGRA) 180 MG tablet; Take 1 tablet (180 mg) by mouth daily.    Chronic obstructive pulmonary disease,  unspecified COPD type (H)      Gastroesophageal reflux disease without esophagitis      Subclinical hypothyroidism  TSH   Date Value Ref Range Status   06/16/2025 7.53 (H) 0.30 - 4.20 uIU/mL Final   06/29/2020 5.56 (H) 0.40 - 4.00 mU/L Final      Not compliant     Chronic atrial fibrillation (H)  Rate is controlled     Abdominal aortic aneurysm (AAA) 3.0 cm to 5.0 cm in diameter in female      Ascending aorta dilation      Senile osteoporosis  Resume IV reclast   Was held because of hyperkalemia   Mild cognitive impairment  Failed cognitive screening     However it seems she is safe to live at home   Unlikely she will take a memory medication but will follow     Mixed hyperlipidemia  Was started on a statin by vascular surgery   - Lipid panel reflex to direct LDL Non-fasting; Future  - TSH; Future  - Comprehensive metabolic panel; Future  - Lipid panel reflex to direct LDL Fasting; Future  - CBC with Platelets & Differential; Future    Abnormal blood chemistry    - Hemoglobin A1c; Future    Vitamin D deficiency    - Vitamin D Deficiency; Future    Fullness in head  She is not clear in describing it   She says it is not a headache   - ESR: Erythrocyte sedimentation rate; Future  - CRP, inflammation; Future    no shortness of breath ,no  chest pain ,no  Falls, mild urgency and urinary incontinence , no weight changes , sleep and moodhave been good . Independent in ADLS and some IADLS     Is physically able to do all her household acitivites   But it is clear she doesn't want to take her meds and not because she is forgetting to take them . She agrees to take only the essential ones   To simplify her regimen   Will focus on priority ones    Will stop aspirin ( dual therapy is high risk for even if she has a small Aneurysm )  Stop statin    Focus on antigoagluation thyroxine and BP control           BMI  Estimated body mass index is 28.57 kg/m  as calculated from the following:    Height as of this encounter: 1.575 m  "(5' 2\").    Weight as of this encounter: 70.9 kg (156 lb 3.2 oz).       Counseling  Appropriate preventive services were addressed with this patient via screening, questionnaire, or discussion as appropriate for fall prevention, nutrition, physical activity, Tobacco-use cessation, social engagement, weight loss and cognition.  Checklist reviewing preventive services available has been given to the patient.  Reviewed patient's diet, addressing concerns and/or questions.   She is at risk for lack of exercise and has been provided with information to increase physical activity for the benefit of her well-being.   The patient was provided with written information regarding signs of hearing loss.   Information on urinary incontinence and treatment options given to patient.           Jael Houston is a 89 year old, presenting for the following:  Wellness Visit (General health check - not fasting for labs ) and Atrial Fib (Discuss how long patient should stay on Toprol  )        6/16/2025     9:56 AM   Additional Questions   Roomed by Ann BARRETT   Accompanied by Janis - Daughter         6/16/2025     9:56 AM   Patient Reported Additional Medications   Patient reports taking the following new medications none          HPI           Advance Care Planning    Document on file is a Health Care Directive or POLST.        6/16/2025   General Health   How would you rate your overall physical health? Good   Feel stress (tense, anxious, or unable to sleep) Not at all         6/16/2025   Nutrition   Diet: Regular (no restrictions)         6/16/2025   Exercise   Days per week of moderate/strenous exercise 3 days   Average minutes spent exercising at this level 40 min         6/16/2025   Social Factors   Frequency of gathering with friends or relatives Twice a week   Worry food won't last until get money to buy more No   Food not last or not have enough money for food? No   Do you have housing? (Housing is defined as stable " permanent housing and does not include staying outside in a car, in a tent, in an abandoned building, in an overnight shelter, or couch-surfing.) Yes   Are you worried about losing your housing? No   Lack of transportation? No   Unable to get utilities (heat,electricity)? No         2025   Fall Risk   Fallen 2 or more times in the past year? No   Trouble with walking or balance? No          2025   Activities of Daily Living- Home Safety   Needs help with the following daily activites None of the above   Safety concerns in the home None of the above         2025   Dental   Dentist two times every year? Yes         2025   Hearing Screening   Hearing concerns? (!) I NEED TO ASK PEOPLE TO SPEAK UP OR REPEAT THEMSELVES.         2025   Driving Risk Screening   Patient/family members have concerns about driving No         2025   General Alertness/Fatigue Screening   Have you been more tired than usual lately? No         2025   Urinary Incontinence Screening   Bothered by leaking urine in past 6 months Yes         Today's PHQ-2 Score:       2025    10:03 AM   PHQ-2 (  [employer])   Q1: Little interest or pleasure in doing things 0    Q2: Feeling down, depressed or hopeless 0    PHQ-2 Score 0    Q1: Little interest or pleasure in doing things Not at all   Q2: Feeling down, depressed or hopeless Not at all   PHQ-2 Score 0       Proxy-reported           2025   Substance Use   Alcohol more than 3/day or more than 7/wk No   Do you have a current opioid prescription? No   How severe/bad is pain from 1 to 10? 5/10   Do you use any other substances recreationally? No     Social History     Tobacco Use    Smoking status: Former     Current packs/day: 0.00     Average packs/day: 1 pack/day for 10.0 years (10.0 ttl pk-yrs)     Types: Cigarettes     Start date: 1997     Quit date: 2007     Years since quittin.1    Smokeless tobacco: Never   Vaping Use    Vaping status: Never  Used   Substance Use Topics    Alcohol use: No     Alcohol/week: 0.0 standard drinks of alcohol    Drug use: No                    Reviewed and updated as needed this visit by Provider                      Current providers sharing in care for this patient include:  Patient Care Team:  Nya Vasquez MD as PCP - General (Internal Medicine)  Dia Hartman MD as MD (Vascular Surgery)  Conor Luevano MD as MD (Internal Medicine)  Clifton Erickson DO as MD (Neurology)  Dulce Gramajo MD as MD (Cardiovascular Disease)  Colt Amezcua MD as MD (Neurological Surgery)  Dulce Gramajo MD as Assigned Heart and Vascular Provider  Carol Holder APRN CNP as Assigned Neuroscience Provider  Nya Vasquez MD as Assigned PCP  Elaina Brody DO as Assigned Heart and Vascular Surgical Provider    The following health maintenance items are reviewed in Epic and correct as of today:  Health Maintenance   Topic Date Due    RSV VACCINE (1 - 1-dose 75+ series) Never done    COVID-19 VACCINE (4 - 2024-25 season) 09/01/2024    ANNUAL REVIEW OF HM ORDERS  01/24/2025    LIPID  05/20/2025    MICROALBUMIN  05/20/2025    MEDICARE ANNUAL WELLNESS VISIT  05/20/2025    TSH W/FREE T4 REFLEX  08/26/2025    INFLUENZA VACCINE (Season Ended) 09/01/2025    BMP  10/08/2025    HEMOGLOBIN  10/08/2025    FALL RISK ASSESSMENT  06/16/2026    ADVANCE CARE PLANNING  05/20/2029    DTAP/TDAP/TD VACCINE (2 - Td or Tdap) 07/06/2032    DEXA  06/10/2039    SPIROMETRY  Completed    COPD ACTION PLAN  Completed    PHQ-2 (once per calendar year)  Completed    PNEUMOCOCCAL VACCINE 50+ YEARS  Completed    URINALYSIS  Completed    ZOSTER VACCINE  Completed    HPV VACCINE  Aged Out    MENINGITIS VACCINE  Aged Out    MAMMO SCREENING  Discontinued       .     Objective    Exam  /60 (BP Location: Left arm, Patient Position: Sitting, Cuff Size: Adult Regular)   Pulse 77   Temp 97.8  F (36.6  C) (Tympanic)   Resp 16   Ht 1.575  "m (5' 2\")   Wt 70.9 kg (156 lb 3.2 oz)   LMP  (LMP Unknown)   SpO2 97%   BMI 28.57 kg/m     Estimated body mass index is 28.57 kg/m  as calculated from the following:    Height as of this encounter: 1.575 m (5' 2\").    Weight as of this encounter: 70.9 kg (156 lb 3.2 oz).    Physical Exam          6/16/2025   Mini Cog   Clock Draw Score 0 Abnormal   3 Item Recall 1 object recalled   Mini Cog Total Score 1              Signed Electronically by: Nya Vasquez MD    "

## 2025-06-17 ENCOUNTER — TRANSFERRED RECORDS (OUTPATIENT)
Dept: HEALTH INFORMATION MANAGEMENT | Facility: CLINIC | Age: OVER 89
End: 2025-06-17
Payer: COMMERCIAL

## 2025-06-18 ENCOUNTER — TELEPHONE (OUTPATIENT)
Dept: INTERNAL MEDICINE | Facility: CLINIC | Age: OVER 89
End: 2025-06-18
Payer: COMMERCIAL

## 2025-06-18 ENCOUNTER — RESULTS FOLLOW-UP (OUTPATIENT)
Dept: INTERNAL MEDICINE | Facility: CLINIC | Age: OVER 89
End: 2025-06-18

## 2025-06-18 DIAGNOSIS — M81.0 SENILE OSTEOPOROSIS: Primary | ICD-10-CM

## 2025-06-18 DIAGNOSIS — I48.20 CHRONIC ATRIAL FIBRILLATION (H): Primary | ICD-10-CM

## 2025-06-18 RX ORDER — HEPARIN SODIUM,PORCINE 10 UNIT/ML
5-20 VIAL (ML) INTRAVENOUS DAILY PRN
OUTPATIENT
Start: 2025-08-19

## 2025-06-18 RX ORDER — MEPERIDINE HYDROCHLORIDE 25 MG/ML
25 INJECTION INTRAMUSCULAR; INTRAVENOUS; SUBCUTANEOUS EVERY 30 MIN PRN
OUTPATIENT
Start: 2025-08-19

## 2025-06-18 RX ORDER — METHYLPREDNISOLONE SODIUM SUCCINATE 125 MG/2ML
125 INJECTION INTRAMUSCULAR; INTRAVENOUS
Start: 2025-08-19

## 2025-06-18 RX ORDER — DIPHENHYDRAMINE HYDROCHLORIDE 50 MG/ML
50 INJECTION, SOLUTION INTRAMUSCULAR; INTRAVENOUS
Start: 2025-08-19

## 2025-06-18 RX ORDER — ZOLEDRONIC ACID 0.05 MG/ML
5 INJECTION, SOLUTION INTRAVENOUS ONCE
Start: 2025-08-19

## 2025-06-18 RX ORDER — EPINEPHRINE 1 MG/ML
0.3 INJECTION, SOLUTION, CONCENTRATE INTRAVENOUS EVERY 5 MIN PRN
OUTPATIENT
Start: 2025-08-19

## 2025-06-18 RX ORDER — ALBUTEROL SULFATE 90 UG/1
1-2 INHALANT RESPIRATORY (INHALATION)
Start: 2025-08-19

## 2025-06-18 RX ORDER — HEPARIN SODIUM (PORCINE) LOCK FLUSH IV SOLN 100 UNIT/ML 100 UNIT/ML
5 SOLUTION INTRAVENOUS
OUTPATIENT
Start: 2025-08-19

## 2025-06-18 RX ORDER — ALBUTEROL SULFATE 0.83 MG/ML
2.5 SOLUTION RESPIRATORY (INHALATION)
OUTPATIENT
Start: 2025-08-19

## 2025-07-14 ENCOUNTER — VIRTUAL VISIT (OUTPATIENT)
Dept: PHARMACY | Facility: CLINIC | Age: OVER 89
End: 2025-07-14
Attending: INTERNAL MEDICINE
Payer: COMMERCIAL

## 2025-07-14 DIAGNOSIS — I48.20 CHRONIC ATRIAL FIBRILLATION (H): Primary | ICD-10-CM

## 2025-07-14 DIAGNOSIS — E03.8 OTHER SPECIFIED HYPOTHYROIDISM: ICD-10-CM

## 2025-07-14 DIAGNOSIS — M54.50 BILATERAL LOW BACK PAIN WITHOUT SCIATICA, UNSPECIFIED CHRONICITY: ICD-10-CM

## 2025-07-14 DIAGNOSIS — I10 BENIGN ESSENTIAL HYPERTENSION: ICD-10-CM

## 2025-07-14 DIAGNOSIS — J30.1 SEASONAL ALLERGIC RHINITIS DUE TO POLLEN: ICD-10-CM

## 2025-07-14 DIAGNOSIS — Z78.9 TAKES DIETARY SUPPLEMENTS: ICD-10-CM

## 2025-07-14 DIAGNOSIS — L29.9 EAR ITCHING: ICD-10-CM

## 2025-07-14 PROCEDURE — 99607 MTMS BY PHARM ADDL 15 MIN: CPT | Mod: 95

## 2025-07-14 PROCEDURE — 99605 MTMS BY PHARM NP 15 MIN: CPT | Mod: 95

## 2025-07-14 RX ORDER — MECLIZINE HCL 25MG 25 MG/1
25 TABLET, CHEWABLE ORAL EVERY 6 HOURS PRN
COMMUNITY

## 2025-07-14 RX ORDER — TRIAMCINOLONE ACETONIDE 1 MG/G
CREAM TOPICAL 2 TIMES DAILY
Qty: 15 G | Refills: 2 | Status: SHIPPED | OUTPATIENT
Start: 2025-07-14

## 2025-07-14 NOTE — Clinical Note
Matt Vasquez,  Patient was taking metoprolol succinate and was switch to atenolol; unsure if it was an accident? I am working with Yesi to do PA for Errol.   Thanks, Matthew

## 2025-07-14 NOTE — LETTER
July 14, 2025  Rosemarie HAIR Ang  [address] [place] [address]    Dear Ms. Fry, [facility]     Thank you for talking with me on Jul 14, 2025 about your health and medications. As a follow-up to our conversation, I have included two documents:      Your Recommended To-Do List has steps you should take to get the best results from your medications.  Your Medication List will help you keep track of your medications and how to take them.    If you want to talk about these documents, please call ANDREIA MONCADA RPH at phone: [phone], Monday-Friday 8-4:30pm.    I look forward to working with you and your doctors to make sure your medications work well for you.    Sincerely,  ANDREIA MONCADA RPH  [facility] Pharmacist, [facility]

## 2025-07-14 NOTE — PATIENT INSTRUCTIONS
"Recommendations from today's MTM visit:                                                      MTM (medication therapy management) is a service provided by a clinical pharmacist designed to help you get the most of out of your medicines.   Today we reviewed what your medicines are for, how to know if they are working, that your medicines are safe and how to make your medicine regimen as easy as possible.      PharmD communicate with PCP about switching back to metoprolol succinate 50 mg daily   PharmD to communicate with patient about levothyroxine adherence   PharmD find a one month Xarelto free coupon   Daughter to check with insurance what is the price of  dabigatran     Follow-up: Due on when needed by patient, provider or insurance       It was great speaking with you today.  I value your experience and would be very thankful for your time in providing feedback in our clinic survey. In the next few days, you may receive an email or text message from [employer] with a link to a survey related to your  clinical pharmacist.\"     To schedule another MTM appointment, please call the clinic directly or you may call the MTM scheduling line at [phone].    My Clinical Pharmacist's contact information:                                                      Please feel free to contact me with any questions or concerns you have.        Matthew Rojas, PharmD     Medication Therapy Management (MTM) Pharmacist     [phone]     [email or web address]     [facility]    "

## 2025-07-14 NOTE — LETTER
_  Medication List        Prepared on: Jul 14, 2025     Bring your Medication List when you go to the doctor, hospital, or   emergency room. And, share it with your family or caregivers.     Note any changes to how you take your medications.  Cross out medications when you no longer use them.    Medication How I take it Why I use it Prescriber   acetaminophen (TYLENOL) 325 MG tablet Take 325 mg by mouth every 6 hours as needed for mild pain  Mild Pain  Entered By History Unknown   atenolol (TENORMIN) 50 MG tablet Take 1 tablet (50 mg) by mouth daily. Chronic Atrial Fibrillation (H) Nya Vasquez MD   calcium-vitamin D-vitamin K (VIACTIV) 500-500-40 MG-UNT-MCG CHEW Take 1 tablet by mouth daily  General Health Patient Reported   fexofenadine (ALLEGRA) 180 MG tablet Take 1 tablet (180 mg) by mouth daily. Seasonal allergic rhinitis due to pollen Nya Vasquez MD   fluocinolone acetonide oil 0.01 % ear drops Place 0.25 mLs (5 drops) into both ears 2 times daily as needed (ear itching) Ear itching; Vasomotor Rhinitis; Dizziness; Facial pressure Aravind Hunt MD   levothyroxine (SYNTHROID/LEVOTHROID) 50 MCG tablet TAKE 1 TABLET(50 MCG) BY MOUTH DAILY Hypothyroidism due to Hashimoto's thyroiditis Javi Patel MD   meclizine 25 MG CHEW Take 25 mg by mouth every 6 hours as needed for dizziness.  Dizziness  Patient Reported   rivaroxaban ANTICOAGULANT (XARELTO ANTICOAGULANT) 15 MG TABS tablet Take 1 tablet (15 mg) by mouth every morning. Please check coupon information on note to pharmacy section Chronic Atrial Fibrillation (H) Nya Vasquez MD   triamcinolone (KENALOG) 0.1 % external cream Apply topically 2 times daily. Apply to outer ear  Rash/Itching Nya Vasquez MD         Add new medications, over-the-counter drugs, herbals, vitamins, or  minerals in the blank rows below.    Medication How I take it Why I use it Prescriber                                      Allergies:      - Sulfa Antibiotics - Rash         Side effects I have had:      Not on File        Other Information:              My notes and questions:

## 2025-07-14 NOTE — PROGRESS NOTES
Medication Therapy Management (MTM) Encounter    ASSESSMENT:                          Medication Adherence/Access: no issues reported.    Hypertension /Afib: In clinic BP within goal of <  140/90. Metoprolol was switched to atenolol; will communicate with PCP to switch it back. Due to high price we will consider applying for PAP. We will consider using one month Xarelto coupon.     Allergy :Stable on allegra.    Ear itching:Stable     Hypothyroidism: Patient is not adherent to taking levothyroxine. Advised to take it daily as prescribed. Low levels of thyroid is likely due to not taking the medication rather than the medication being less effective.     Back Pain:Pain is not controlled, but current medications are helping     Supplements :Continue taking current dietary supplements     PLAN:                            PharmD communicate with PCP about switching back to metoprolol succinate 50 mg daily   PharmD communicate with about levothyroxine adherence   PharmD to start with Xarelto free coupon   Daughter to check with insurance what is the dabigatran     Follow-up: Due on when needed by patient, provider or insurance     SUBJECTIVE/OBJECTIVE:                          Rosemarie Fry is a 89 year old female seen for an initial visit. She was referred to me from Dr. Vasquez     Reason for visit: Medication Review Initial.    Allergies/ADRs: Reviewed in chart  Past Medical History: Reviewed in chart  Tobacco: She reports that she quit smoking about 18 years ago. Her smoking use included cigarettes. She started smoking about 28 years ago. She has a 10 pack-year smoking history. She has never used smokeless tobacco.    Medication Adherence/Access: No issues identified.    Hypertension /Afib:   Xarelto 15 mg daily every morning    Atenolol 50 mg daily   Patient reports the following medication side effects: Patient feels dizziness and lightheadedness;have situational vertigo.   Patient does not self-monitor blood  pressure.  Denies any bleeding or bruising  BP Readings from Last 3 Encounters:   06/16/25 116/60   03/24/25 133/73   08/26/24 128/74     Pulse Readings from Last 3 Encounters:   06/16/25 77   03/24/25 76   08/26/24 67      Allergy:   Fexofenadine 180 mg daily   Patient reports no current medication side effects.    Patient noted this medication helps with stuffiness   Patient feels that current therapy is effective.      Ear itching:   Fluocinolone acetonide oil 0.01% ear drops and place 0.25 mg into both ear daily as needed (ear itching).     Noted she might not be using it, but it is used on as needed basis     Hypothyroidism:    Levothyroxine 50 mcg daily   Patient is having the following symptoms: hypothyroidism -  none and hyperthyroidism -  None.   TSH   Date Value Ref Range Status   06/16/2025 7.53 (H) 0.30 - 4.20 uIU/mL Final   06/29/2020 5.56 (H) 0.40 - 4.00 mU/L Final     Back Pain:   Acetaminophen 325 mg and takes 2 tablets every 6 hours daily as needed    Patient has been seen [facility] and has been getting shots   Patient complained she is having back itching and pain.     Supplements:    Calcium -vitamin D - Vitamin K 500 - 500 - 40  table daily        Today's Vitals: LMP  (LMP Unknown)   ----------------      I spent 48 minutes with this patient today. All changes were made via collaborative practice agreement with Nya Vasquez MD.     A summary of these recommendations was sent via [employer].      Telemedicine Visit Details  The patient's medications can be safely assessed via a telemedicine encounter.  Type of service:  Video Conference via "[employer]  Originating Location (pt. Location): Home    Distant Location (provider location):  On-site  Start Time: 9:20 AM  End Time:  10:08 AM     Medication Therapy Recommendations  No medication therapy recommendations to display     Matthew Rojas, SamsonD     Medication Therapy Management (MTM) Pharmacist     [phone]      [email or web address]

## 2025-07-14 NOTE — LETTER
"Recommended To-Do List      Prepared on: Jul 14, 2025       You can get the best results from your medications by completing the items on this \"To-Do List.\"      Bring your To-Do List when you go to your doctor. And, share it with your family or caregivers.    My To-Do List:  What we talked about: What I should do:   The importance of taking your medication as intended    Reminder to take your medication as prescribed for levothyroxine (SYNTHROID/LEVOTHROID)          What we talked about: What I should do:                     "

## 2025-07-21 ENCOUNTER — TRANSFERRED RECORDS (OUTPATIENT)
Dept: HEALTH INFORMATION MANAGEMENT | Facility: CLINIC | Age: OVER 89
End: 2025-07-21
Payer: COMMERCIAL

## 2025-08-04 ENCOUNTER — ANCILLARY PROCEDURE (OUTPATIENT)
Dept: VASCULAR ULTRASOUND | Facility: CLINIC | Age: OVER 89
End: 2025-08-04
Attending: STUDENT IN AN ORGANIZED HEALTH CARE EDUCATION/TRAINING PROGRAM
Payer: COMMERCIAL

## 2025-08-04 DIAGNOSIS — R09.89 OTHER SPECIFIED SYMPTOMS AND SIGNS INVOLVING THE CIRCULATORY AND RESPIRATORY SYSTEMS: ICD-10-CM

## 2025-08-04 DIAGNOSIS — I71.40 ABDOMINAL AORTIC ANEURYSM (AAA) 3.0 CM TO 5.0 CM IN DIAMETER IN FEMALE: ICD-10-CM

## 2025-08-04 PROCEDURE — 93926 LOWER EXTREMITY STUDY: CPT | Mod: 26 | Performed by: SURGERY

## 2025-08-04 PROCEDURE — 93926 LOWER EXTREMITY STUDY: CPT

## (undated) RX ORDER — ALBUTEROL SULFATE 0.83 MG/ML
SOLUTION RESPIRATORY (INHALATION)
Status: DISPENSED
Start: 2018-11-21